# Patient Record
Sex: FEMALE | Race: WHITE | Employment: UNEMPLOYED | URBAN - METROPOLITAN AREA
[De-identification: names, ages, dates, MRNs, and addresses within clinical notes are randomized per-mention and may not be internally consistent; named-entity substitution may affect disease eponyms.]

---

## 2021-02-14 ENCOUNTER — APPOINTMENT (OUTPATIENT)
Dept: GENERAL RADIOLOGY | Age: 56
End: 2021-02-14
Payer: MEDICAID

## 2021-02-14 ENCOUNTER — HOSPITAL ENCOUNTER (EMERGENCY)
Age: 56
Discharge: HOME | End: 2021-02-14
Payer: MEDICAID

## 2021-02-14 VITALS
SYSTOLIC BLOOD PRESSURE: 124 MMHG | DIASTOLIC BLOOD PRESSURE: 86 MMHG | TEMPERATURE: 98.3 F | OXYGEN SATURATION: 97 % | RESPIRATION RATE: 18 BRPM | HEART RATE: 86 BPM

## 2021-02-14 DIAGNOSIS — S52.502A CLOSED FRACTURE OF DISTAL END OF LEFT RADIUS, UNSPECIFIED FRACTURE MORPHOLOGY, INITIAL ENCOUNTER: Primary | ICD-10-CM

## 2021-02-14 PROCEDURE — 6370000000 HC RX 637 (ALT 250 FOR IP): Performed by: PHYSICIAN ASSISTANT

## 2021-02-14 PROCEDURE — 99283 EMERGENCY DEPT VISIT LOW MDM: CPT

## 2021-02-14 PROCEDURE — 73110 X-RAY EXAM OF WRIST: CPT

## 2021-02-14 PROCEDURE — 25605 CLTX DST RDL FX/EPHYS SEP W/: CPT

## 2021-02-14 RX ORDER — ETHYL CHLORIDE 100 %
AEROSOL, SPRAY (ML) TOPICAL ONCE
Status: DISCONTINUED | OUTPATIENT
Start: 2021-02-14 | End: 2021-02-14 | Stop reason: HOSPADM

## 2021-02-14 RX ORDER — LIDOCAINE HYDROCHLORIDE 20 MG/ML
10 INJECTION, SOLUTION EPIDURAL; INFILTRATION; INTRACAUDAL; PERINEURAL ONCE
Status: DISCONTINUED | OUTPATIENT
Start: 2021-02-14 | End: 2021-02-14 | Stop reason: HOSPADM

## 2021-02-14 RX ORDER — OXYCODONE HYDROCHLORIDE AND ACETAMINOPHEN 5; 325 MG/1; MG/1
1 TABLET ORAL ONCE
Status: COMPLETED | OUTPATIENT
Start: 2021-02-14 | End: 2021-02-14

## 2021-02-14 RX ORDER — OXYCODONE HYDROCHLORIDE AND ACETAMINOPHEN 5; 325 MG/1; MG/1
1 TABLET ORAL EVERY 4 HOURS PRN
Qty: 15 TABLET | Refills: 0 | Status: SHIPPED | OUTPATIENT
Start: 2021-02-14 | End: 2021-02-17

## 2021-02-14 RX ADMIN — OXYCODONE HYDROCHLORIDE AND ACETAMINOPHEN 1 TABLET: 5; 325 TABLET ORAL at 17:07

## 2021-02-14 ASSESSMENT — PAIN DESCRIPTION - PAIN TYPE: TYPE: ACUTE PAIN

## 2021-02-14 ASSESSMENT — PAIN SCALES - GENERAL
PAINLEVEL_OUTOF10: 4
PAINLEVEL_OUTOF10: 6

## 2021-02-14 NOTE — ED PROVIDER NOTES
EMERGENCY DEPARTMENT ENCOUNTER      PCP: No primary care provider on file. CHIEF COMPLAINT    Chief Complaint   Patient presents with    Wrist Injury     left         This patient was not evaluated by the attending physician. I have independently evaluated this patient . HPI    Gemini Hyman is a 54 y.o. female who presents with Left wrist pain. Onset was prior to arrival.  Context is patient slipped on snow while walking on her porch outside causing her to fall onto left outstretched hand. She immediately notes pain to the left wrist region. Pain is achy, does not radiate. Pain is worse with attempted range of motion of wrist.  No other injuries. No distal numbness, tingling, weakness. REVIEW OF SYSTEMS    General: Denies fever or chills  Cardiac: Denies chest pain or chestwall pain. Pulmonary: Denies shortness of breath  GI: Denies abdominal pain, vomiting, or diarrhea  : No dysuria or hematuria  Musculoskeletal: See HPI. No other injuries. Denies any other muscles skeletal injuries, including elbow, shoulder,chest wall and back. All other review of systems are negative  See HPI and nursing notes for additional information     [address]    History reviewed. No pertinent past medical history. History reviewed. No pertinent surgical history.     CURRENT MEDICATIONS        ALLERGIES    No Known Allergies    SOCIAL & FAMILY HISTORY    Social History     Socioeconomic History    Marital status:      Spouse name: None    Number of children: None    Years of education: None    Highest education level: None   Occupational History    None   Social Needs    Financial resource strain: None    Food insecurity     Worry: None     Inability: None    Transportation needs     Medical: None     Non-medical: None   Tobacco Use    Smoking status: Current Every Day Smoker     Packs/day: 2.00    Smokeless tobacco: Never Used   Substance and Sexual Activity    Alcohol use: Not Currently    Drug use: Not Currently    Sexual activity: Not Currently   Lifestyle    Physical activity     Days per week: None     Minutes per session: None    Stress: None   Relationships    Social connections     Talks on phone: None     Gets together: None     Attends Holiness service: None     Active member of club or organization: None     Attends meetings of clubs or organizations: None     Relationship status: None    Intimate partner violence     Fear of current or ex partner: None     Emotionally abused: None     Physically abused: None     Forced sexual activity: None   Other Topics Concern    None   Social History Narrative    None     History reviewed. No pertinent family history. PHYSICAL EXAM    VITAL SIGNS: BP (!) 139/91   Pulse 88   Temp 97.9 °F (36.6 °C) (Oral)   Resp 18   SpO2 99%   Constitutional:  Well developed, well nourished, no acute distress, non-toxic appearance   HENT:  Atraumatic    Musculoskeletal:    Left  Wrist:  There is moderate swelling over the dorsal aspect of the distal forearm with slight bayonet deformity. This region is tender to palpation. No snuffbox tenderness. Range of motion limited due to pain. Distal sensation and capillary refill intact. Elbow, including radial head is non-tender. No swelling, discoloration, or tenderness to palpation of the ipsilateral elbow and hand/fingers. Distal motor, sensation, capillary refill intact. Integument:  Well hydrated, no rash. skin intact  Vascular: affected extremity distally neurovascularly intact - sensation and capillary refill intact. Neurologic:  Awake alert, normal flow ofspeech   Psychiatric: Cooperative, pleasant affect    RADIOLOGY/PROCEDURES    XR WRIST LEFT (MIN 3 VIEWS)   Final Result   1. Severely comminuted, displaced distal radial fracture with dorsal   angulation and overriding of fragments. 2. Displaced ulnar styloid process fracture. questions or concerns please feel free to contact the dictating provider for clarification.        Tyrone Magallon, [address]  02/14/21 4471

## 2021-02-14 NOTE — ED NOTES
1819 paged Dr Sarah Shelton. [address]  02/14/21 1819  1820 Dr aSrah Shelton returned call.       [address]  02/14/21 182

## 2021-02-15 ENCOUNTER — TELEPHONE (OUTPATIENT)
Dept: ORTHOPEDIC SURGERY | Age: 56
End: 2021-02-15

## 2021-02-15 ENCOUNTER — HOSPITAL ENCOUNTER (OUTPATIENT)
Age: 56
Discharge: HOME | End: 2021-02-15
Payer: MEDICAID

## 2021-02-15 ENCOUNTER — ANESTHESIA EVENT (OUTPATIENT)
Dept: OPERATING ROOM | Age: 56
End: 2021-02-15
Payer: MEDICAID

## 2021-02-15 ENCOUNTER — OFFICE VISIT (OUTPATIENT)
Dept: ORTHOPEDIC SURGERY | Age: 56
End: 2021-02-15
Payer: MEDICAID

## 2021-02-15 VITALS — BODY MASS INDEX: 31.39 KG/M2 | HEIGHT: 67 IN | OXYGEN SATURATION: 93 % | HEART RATE: 92 BPM | WEIGHT: 200 LBS

## 2021-02-15 DIAGNOSIS — S52.502A TRAUMATIC CLOSED DISPLACED FRACTURE OF DISTAL END OF LEFT RADIUS AND ULNA, INITIAL ENCOUNTER: Primary | ICD-10-CM

## 2021-02-15 DIAGNOSIS — S52.602A TRAUMATIC CLOSED DISPLACED FRACTURE OF DISTAL END OF LEFT RADIUS AND ULNA, INITIAL ENCOUNTER: Primary | ICD-10-CM

## 2021-02-15 PROCEDURE — C9803 HOPD COVID-19 SPEC COLLECT: HCPCS

## 2021-02-15 PROCEDURE — U0002 COVID-19 LAB TEST NON-CDC: HCPCS

## 2021-02-15 PROCEDURE — 99203 OFFICE O/P NEW LOW 30 MIN: CPT | Performed by: ORTHOPAEDIC SURGERY

## 2021-02-15 RX ORDER — PANTOPRAZOLE SODIUM 20 MG/1
20 TABLET, DELAYED RELEASE ORAL DAILY
COMMUNITY

## 2021-02-15 RX ORDER — OXYCODONE HYDROCHLORIDE AND ACETAMINOPHEN 5; 325 MG/1; MG/1
1 TABLET ORAL EVERY 6 HOURS PRN
Qty: 20 TABLET | Refills: 0 | Status: SHIPPED | OUTPATIENT
Start: 2021-02-15 | End: 2021-02-22

## 2021-02-15 RX ORDER — CEPHALEXIN 250 MG/1
250 CAPSULE ORAL 4 TIMES DAILY
Qty: 4 CAPSULE | Refills: 0 | Status: SHIPPED | OUTPATIENT
Start: 2021-02-15 | End: 2021-02-16

## 2021-02-15 RX ORDER — CETIRIZINE HYDROCHLORIDE 10 MG/1
10 TABLET ORAL DAILY
COMMUNITY

## 2021-02-15 ASSESSMENT — ENCOUNTER SYMPTOMS
COLOR CHANGE: 0
SHORTNESS OF BREATH: 0

## 2021-02-15 ASSESSMENT — LIFESTYLE VARIABLES: SMOKING_STATUS: 1

## 2021-02-15 NOTE — PROGRESS NOTES
Surgery:  [email or web address]                        Arrival time: 0930  Nothing to eat or drink after midnight unless instructed to take certain medications by the doctor or the nurse the am of surgery  Arrive at the front information desk -[facility] is on [address] [facility]  Please leave money and all other valuables at home. Wear comfortable clothing. If you wear contacts please bring a case. No make up. You may be asked to remove rings or body piercing. Please bring insurance cards and picture ID am of procedure. Please bring any consent or paper work from your doctor. If you become ill,such as a cold, sore throat or fever contact your doctor. Please bathe or shower am of procedure.   Medications to take AM of procedure:     Any questions call [facility]  [phone]     Pt had COVID done 2/15/21

## 2021-02-15 NOTE — PROGRESS NOTES
Subjective:      Patient ID: Alejandro Spencer is a 54 y.o. female. Elliott Stone presents today for evaluation of a left wrist injury that occurred yesterday when she slipped and fell off her patio. She landed directly on her left out reached hand. Her pain today is rated 8/10 without pain medication. She comes in today for her first visit with me in regards to her left wrist injury. She states that since she fell she has continued to have a deep, throbbing pain globally in her left wrist.  She states that she is having some numbness in her index finger and middle finger distally on both the dorsal and palmar side. She has been wearing her splint since she was seen in the ED which has been helping with her pain. She denies any prior injury to the left wrist and denies any fever or chills. Review of Systems   Constitutional: Negative for activity change, chills and fever. Respiratory: Negative for shortness of breath. Cardiovascular: Negative for chest pain. Musculoskeletal: Positive for arthralgias, joint swelling and myalgias. Negative for gait problem. Skin: Negative for color change, pallor, rash and wound. Neurological: Positive for weakness and numbness. No past medical history on file. Objective:   Physical Exam  Constitutional:       Appearance: She is well-developed. HENT:      Head: Normocephalic and atraumatic. Eyes:      Pupils: Pupils are equal, round, and reactive to light. Neck:      Musculoskeletal: Normal range of motion. Pulmonary:      Effort: Pulmonary effort is normal.   Musculoskeletal:         General: Swelling, tenderness, deformity and signs of injury present. Right elbow: Normal.     Left elbow: Normal.      Right wrist: She exhibits normal range of motion, no tenderness, no bony tenderness, no swelling, no effusion, no crepitus, no deformity and no laceration. Left wrist: She exhibits decreased range of motion, tenderness, bony tenderness, swelling and deformity. She exhibits no effusion, no crepitus and no laceration. Skin:     General: Skin is warm and dry. Capillary Refill: Capillary refill takes less than 2 seconds. Coloration: Skin is not pale. Findings: No erythema or rash. Neurological:      Mental Status: She is alert and oriented to person, place, and time. Left wrist-skin intact, no tenting of the skin, moderate swelling and moderate ecchymosis, compartments soft. Range of motion not assessed due to recent injury. She is able to flex and extend her fingers but decreased motion due to pain. XRAY  X-ray 3 views of the left wrist from February 14, 2021 reviewed by me today in the office demonstrates age appropriate bone density throughout with a comminuted, transverse fracture through the distal radius with 100% dorsal displacement, shortening and angulation, no subluxation or dislocation noted      Assessment:      Left distal radius fracture      Plan:      I discussed with her today her x-ray findings. I explained to her that she does have a fracture in her left wrist which will require surgical treatment. I discussed with her today performing open reduction and internal fixation of her left distal radius fracture. I explained risks, benefits, possible complications of the procedure and answered all questions for the patient. I explained postoperative rehabilitation protocol and expectations with the patient today. The patient understands and consents to the procedure. Patient will follow up with their primary care physician prior to surgical treatment for preoperative clearance. We will schedule surgery at soonest convenience. Continue splint as needed for comfort. Pain medication as needed. Ice and elevate as needed.   Follow-up will be 2 weeks postop for suture removal and recheck with x-rays of the left wrist. We will plan on proceeding with surgical treatment tomorrow at [facility].         Lesia 97, DO

## 2021-02-15 NOTE — PATIENT INSTRUCTIONS
We will schedule surgery at soonest convenience. If you have any questions regarding your surgery please call our office and ask to speak with Kristi.  [phone]

## 2021-02-15 NOTE — TELEPHONE ENCOUNTER
Dr. Chung Files was called by the ED, he would like to see patient today and do surgery tomorrow, called [phone], left message for a return call for an appt.  Johnathon Reddy

## 2021-02-15 NOTE — ANESTHESIA PRE PROCEDURE
Department of Anesthesiology  Preprocedure Note       Name:  Дмитрий Campbell   Age:  54 y.o.  :  1965                                          MRN:  [id number]         Date:  2/15/2021      Surgeon: Boaz Knight):  Aury Naidu DO    Procedure: Procedure(s):  LEFT HUMERUS OPEN REDUCTION INTERNAL FIXATION    Medications prior to admission:   Prior to Admission medications    Medication Sig Start Date End Date Taking? Authorizing Provider   cetirizine (ZYRTEC) 10 MG tablet Take 10 mg by mouth daily    Historical Provider, MD   pantoprazole (PROTONIX) 20 MG tablet Take 20 mg by mouth daily    Historical Provider, MD   oxyCODONE-acetaminophen (PERCOCET) 5-325 MG per tablet Take 1 tablet by mouth every 6 hours as needed for Pain for up to 7 days. Intended supply: 3 days. Take lowest dose possible to manage pain 2/15/21 2/22/21  Aury Naidu DO   cephALEXin [facility]) 250 MG capsule Take 1 capsule by mouth 4 times daily for 1 day 2/15/21 2/16/21  Aury Naidu DO   oxyCODONE-acetaminophen (PERCOCET) 5-325 MG per tablet Take 1 tablet by mouth every 4 hours as needed for Pain for up to 3 days. 21  OSCAR Mayfield       Current medications:    No current facility-administered medications for this encounter. Current Outpatient Medications   Medication Sig Dispense Refill    cetirizine (ZYRTEC) 10 MG tablet Take 10 mg by mouth daily      pantoprazole (PROTONIX) 20 MG tablet Take 20 mg by mouth daily      oxyCODONE-acetaminophen (PERCOCET) 5-325 MG per tablet Take 1 tablet by mouth every 6 hours as needed for Pain for up to 7 days. Intended supply: 3 days. Take lowest dose possible to manage pain 20 tablet 0    cephALEXin (KEFLEX) 250 MG capsule Take 1 capsule by mouth 4 times daily for 1 day 4 capsule 0    oxyCODONE-acetaminophen (PERCOCET) 5-325 MG per tablet Take 1 tablet by mouth every 4 hours as needed for Pain for up to 3 days.  15 tablet 0       Allergies:  No Known Allergies Beta Blocker:  Not on Beta Blocker         Neuro/Psych:               GI/Hepatic/Renal:             Endo/Other:              Pt had no PAT visit       Abdominal:           Vascular:                                      Anesthesia Plan      general and regional     ASA 2     (Chart review only  covid pending )  Induction: intravenous. MIPS: Postoperative opioids intended. Anesthetic plan and risks discussed with patient. Plan discussed with CRNA.     Attending anesthesiologist reviewed and agrees with Pre Eval content            JOANN Lowe - CRNA   2/15/2021

## 2021-02-15 NOTE — PROGRESS NOTES
Janis Sylvester presents today for evaluation of a left wrist injury that occurred yesterday when she slipped and fell off her patio. She landed directly on her left out reached hand. Her pain today is rated 8/10 without pain medication.

## 2021-02-15 NOTE — ED NOTES
Reviewed discharge information. Patient verbalized understanding of paperwork, medication, and care instructions. Patient denied any questions.      Mobile [employer], RN  02/14/21 8320

## 2021-02-16 ENCOUNTER — APPOINTMENT (OUTPATIENT)
Dept: GENERAL RADIOLOGY | Age: 56
End: 2021-02-16
Attending: ORTHOPAEDIC SURGERY
Payer: MEDICAID

## 2021-02-16 ENCOUNTER — ANESTHESIA (OUTPATIENT)
Dept: OPERATING ROOM | Age: 56
End: 2021-02-16
Payer: MEDICAID

## 2021-02-16 ENCOUNTER — HOSPITAL ENCOUNTER (OUTPATIENT)
Age: 56
Setting detail: OUTPATIENT SURGERY
Discharge: HOME | End: 2021-02-16
Attending: ORTHOPAEDIC SURGERY | Admitting: ORTHOPAEDIC SURGERY
Payer: MEDICAID

## 2021-02-16 VITALS
RESPIRATION RATE: 16 BRPM | BODY MASS INDEX: 31.39 KG/M2 | WEIGHT: 200 LBS | HEART RATE: 83 BPM | TEMPERATURE: 97.2 F | SYSTOLIC BLOOD PRESSURE: 110 MMHG | OXYGEN SATURATION: 92 % | DIASTOLIC BLOOD PRESSURE: 98 MMHG | HEIGHT: 67 IN

## 2021-02-16 VITALS
SYSTOLIC BLOOD PRESSURE: 99 MMHG | TEMPERATURE: 96.8 F | RESPIRATION RATE: 16 BRPM | OXYGEN SATURATION: 97 % | DIASTOLIC BLOOD PRESSURE: 65 MMHG

## 2021-02-16 LAB
SARS-COV-2, NAAT: NOT DETECTED
SARS-COV-2: NOT DETECTED
SOURCE: NORMAL

## 2021-02-16 PROCEDURE — 6360000002 HC RX W HCPCS: Performed by: ANESTHESIOLOGY

## 2021-02-16 PROCEDURE — 3700000000 HC ANESTHESIA ATTENDED CARE: Performed by: ORTHOPAEDIC SURGERY

## 2021-02-16 PROCEDURE — C1713 ANCHOR/SCREW BN/BN,TIS/BN: HCPCS | Performed by: ORTHOPAEDIC SURGERY

## 2021-02-16 PROCEDURE — 3209999900 FL LESS THAN 1 HOUR

## 2021-02-16 PROCEDURE — 2709999900 HC NON-CHARGEABLE SUPPLY: Performed by: ORTHOPAEDIC SURGERY

## 2021-02-16 PROCEDURE — 6360000002 HC RX W HCPCS: Performed by: ORTHOPAEDIC SURGERY

## 2021-02-16 PROCEDURE — 3600000014 HC SURGERY LEVEL 4 ADDTL 15MIN: Performed by: ORTHOPAEDIC SURGERY

## 2021-02-16 PROCEDURE — 7100000011 HC PHASE II RECOVERY - ADDTL 15 MIN: Performed by: ORTHOPAEDIC SURGERY

## 2021-02-16 PROCEDURE — 7100000010 HC PHASE II RECOVERY - FIRST 15 MIN: Performed by: ORTHOPAEDIC SURGERY

## 2021-02-16 PROCEDURE — 3700000001 HC ADD 15 MINUTES (ANESTHESIA): Performed by: ORTHOPAEDIC SURGERY

## 2021-02-16 PROCEDURE — 2500000003 HC RX 250 WO HCPCS: Performed by: NURSE ANESTHETIST, CERTIFIED REGISTERED

## 2021-02-16 PROCEDURE — 64415 NJX AA&/STRD BRCH PLXS IMG: CPT | Performed by: ANESTHESIOLOGY

## 2021-02-16 PROCEDURE — 25609 OPTX DST RD XART FX/EP SEP3+: CPT | Performed by: PHYSICIAN ASSISTANT

## 2021-02-16 PROCEDURE — 6360000002 HC RX W HCPCS: Performed by: NURSE ANESTHETIST, CERTIFIED REGISTERED

## 2021-02-16 PROCEDURE — 87635 SARS-COV-2 COVID-19 AMP PRB: CPT

## 2021-02-16 PROCEDURE — 2580000003 HC RX 258: Performed by: ORTHOPAEDIC SURGERY

## 2021-02-16 PROCEDURE — 3600000004 HC SURGERY LEVEL 4 BASE: Performed by: ORTHOPAEDIC SURGERY

## 2021-02-16 PROCEDURE — 2780000010 HC IMPLANT OTHER: Performed by: ORTHOPAEDIC SURGERY

## 2021-02-16 PROCEDURE — 6370000000 HC RX 637 (ALT 250 FOR IP): Performed by: ORTHOPAEDIC SURGERY

## 2021-02-16 PROCEDURE — 25609 OPTX DST RD XART FX/EP SEP3+: CPT | Performed by: ORTHOPAEDIC SURGERY

## 2021-02-16 DEVICE — LOQTEQ® VA CORTICAL SCREW 2.5, T8, L 20 TITANIUM
Type: IMPLANTABLE DEVICE | Site: WRIST | Status: FUNCTIONAL
Brand: LOQTEQ®

## 2021-02-16 DEVICE — LOQTEQ® VA CORTICAL SCREW 2.5, T8, L 22 TITANIUM
Type: IMPLANTABLE DEVICE | Site: WRIST | Status: FUNCTIONAL
Brand: LOQTEQ®

## 2021-02-16 DEVICE — LOQTEQ® VA CORTICAL SCREW 2.5, T8, L 14 TITANIUM
Type: IMPLANTABLE DEVICE | Site: WRIST | Status: FUNCTIONAL
Brand: LOQTEQ®

## 2021-02-16 DEVICE — LOQTEQ® VA CORTICAL SCREW 2.5, T8, L 16 TITANIUM
Type: IMPLANTABLE DEVICE | Site: WRIST | Status: FUNCTIONAL
Brand: LOQTEQ®

## 2021-02-16 DEVICE — LOQTEQ® VA CORTICAL SCREW 2.5, T8, L 18 TITANIUM
Type: IMPLANTABLE DEVICE | Site: WRIST | Status: FUNCTIONAL
Brand: LOQTEQ®

## 2021-02-16 DEVICE — CORTICAL SCREW 2.5, SMALL HEAD T8, SELF-TAPP. L 14: Type: IMPLANTABLE DEVICE | Site: WRIST | Status: FUNCTIONAL

## 2021-02-16 RX ORDER — FENTANYL CITRATE 50 UG/ML
INJECTION, SOLUTION INTRAMUSCULAR; INTRAVENOUS PRN
Status: DISCONTINUED | OUTPATIENT
Start: 2021-02-16 | End: 2021-02-16 | Stop reason: SDUPTHER

## 2021-02-16 RX ORDER — DIPHENHYDRAMINE HYDROCHLORIDE 50 MG/ML
12.5 INJECTION INTRAMUSCULAR; INTRAVENOUS
Status: DISCONTINUED | OUTPATIENT
Start: 2021-02-16 | End: 2021-02-16 | Stop reason: HOSPADM

## 2021-02-16 RX ORDER — KETAMINE HCL 50MG/ML(1)
SYRINGE (ML) INTRAVENOUS PRN
Status: DISCONTINUED | OUTPATIENT
Start: 2021-02-16 | End: 2021-02-16 | Stop reason: SDUPTHER

## 2021-02-16 RX ORDER — FENTANYL CITRATE 50 UG/ML
50 INJECTION, SOLUTION INTRAMUSCULAR; INTRAVENOUS EVERY 5 MIN PRN
Status: DISCONTINUED | OUTPATIENT
Start: 2021-02-16 | End: 2021-02-16 | Stop reason: HOSPADM

## 2021-02-16 RX ORDER — ACETAMINOPHEN 500 MG
1000 TABLET ORAL ONCE
Status: COMPLETED | OUTPATIENT
Start: 2021-02-16 | End: 2021-02-16

## 2021-02-16 RX ORDER — PROPOFOL 10 MG/ML
INJECTION, EMULSION INTRAVENOUS CONTINUOUS PRN
Status: DISCONTINUED | OUTPATIENT
Start: 2021-02-16 | End: 2021-02-16 | Stop reason: SDUPTHER

## 2021-02-16 RX ORDER — OXYCODONE HYDROCHLORIDE 5 MG/1
5 TABLET ORAL EVERY 4 HOURS PRN
Status: CANCELLED | OUTPATIENT
Start: 2021-02-16

## 2021-02-16 RX ORDER — SODIUM CHLORIDE 0.9 % (FLUSH) 0.9 %
10 SYRINGE (ML) INJECTION PRN
Status: CANCELLED | OUTPATIENT
Start: 2021-02-16

## 2021-02-16 RX ORDER — HYDROCODONE BITARTRATE AND ACETAMINOPHEN 5; 325 MG/1; MG/1
1 TABLET ORAL PRN
Status: DISCONTINUED | OUTPATIENT
Start: 2021-02-16 | End: 2021-02-16 | Stop reason: HOSPADM

## 2021-02-16 RX ORDER — SODIUM CHLORIDE, SODIUM LACTATE, POTASSIUM CHLORIDE, CALCIUM CHLORIDE 600; 310; 30; 20 MG/100ML; MG/100ML; MG/100ML; MG/100ML
INJECTION, SOLUTION INTRAVENOUS CONTINUOUS
Status: CANCELLED | OUTPATIENT
Start: 2021-02-16

## 2021-02-16 RX ORDER — ROPIVACAINE HYDROCHLORIDE 5 MG/ML
INJECTION, SOLUTION EPIDURAL; INFILTRATION; PERINEURAL
Status: COMPLETED | OUTPATIENT
Start: 2021-02-16 | End: 2021-02-16

## 2021-02-16 RX ORDER — HYDROCODONE BITARTRATE AND ACETAMINOPHEN 5; 325 MG/1; MG/1
2 TABLET ORAL EVERY 6 HOURS PRN
Status: DISCONTINUED | OUTPATIENT
Start: 2021-02-16 | End: 2021-02-16 | Stop reason: HOSPADM

## 2021-02-16 RX ORDER — ONDANSETRON 2 MG/ML
4 INJECTION INTRAMUSCULAR; INTRAVENOUS
Status: DISCONTINUED | OUTPATIENT
Start: 2021-02-16 | End: 2021-02-16 | Stop reason: HOSPADM

## 2021-02-16 RX ORDER — HYDRALAZINE HYDROCHLORIDE 20 MG/ML
5 INJECTION INTRAMUSCULAR; INTRAVENOUS EVERY 10 MIN PRN
Status: DISCONTINUED | OUTPATIENT
Start: 2021-02-16 | End: 2021-02-16 | Stop reason: HOSPADM

## 2021-02-16 RX ORDER — SODIUM CHLORIDE 0.9 % (FLUSH) 0.9 %
10 SYRINGE (ML) INJECTION PRN
Status: DISCONTINUED | OUTPATIENT
Start: 2021-02-16 | End: 2021-02-16 | Stop reason: HOSPADM

## 2021-02-16 RX ORDER — HYDROMORPHONE HCL 110MG/55ML
0.5 PATIENT CONTROLLED ANALGESIA SYRINGE INTRAVENOUS EVERY 5 MIN PRN
Status: DISCONTINUED | OUTPATIENT
Start: 2021-02-16 | End: 2021-02-16 | Stop reason: HOSPADM

## 2021-02-16 RX ORDER — OXYCODONE HYDROCHLORIDE 5 MG/1
10 TABLET ORAL EVERY 4 HOURS PRN
Status: CANCELLED | OUTPATIENT
Start: 2021-02-16

## 2021-02-16 RX ORDER — SODIUM CHLORIDE, SODIUM LACTATE, POTASSIUM CHLORIDE, CALCIUM CHLORIDE 600; 310; 30; 20 MG/100ML; MG/100ML; MG/100ML; MG/100ML
INJECTION, SOLUTION INTRAVENOUS CONTINUOUS
Status: DISCONTINUED | OUTPATIENT
Start: 2021-02-16 | End: 2021-02-16 | Stop reason: HOSPADM

## 2021-02-16 RX ORDER — 0.9 % SODIUM CHLORIDE 0.9 %
500 INTRAVENOUS SOLUTION INTRAVENOUS
Status: DISCONTINUED | OUTPATIENT
Start: 2021-02-16 | End: 2021-02-16 | Stop reason: HOSPADM

## 2021-02-16 RX ORDER — LABETALOL HYDROCHLORIDE 5 MG/ML
5 INJECTION, SOLUTION INTRAVENOUS EVERY 10 MIN PRN
Status: DISCONTINUED | OUTPATIENT
Start: 2021-02-16 | End: 2021-02-16 | Stop reason: HOSPADM

## 2021-02-16 RX ORDER — ONDANSETRON 2 MG/ML
INJECTION INTRAMUSCULAR; INTRAVENOUS PRN
Status: DISCONTINUED | OUTPATIENT
Start: 2021-02-16 | End: 2021-02-16 | Stop reason: SDUPTHER

## 2021-02-16 RX ORDER — SODIUM CHLORIDE 0.9 % (FLUSH) 0.9 %
10 SYRINGE (ML) INJECTION EVERY 12 HOURS SCHEDULED
Status: CANCELLED | OUTPATIENT
Start: 2021-02-16

## 2021-02-16 RX ORDER — DEXAMETHASONE SODIUM PHOSPHATE 4 MG/ML
INJECTION, SOLUTION INTRA-ARTICULAR; INTRALESIONAL; INTRAMUSCULAR; INTRAVENOUS; SOFT TISSUE PRN
Status: DISCONTINUED | OUTPATIENT
Start: 2021-02-16 | End: 2021-02-16 | Stop reason: SDUPTHER

## 2021-02-16 RX ORDER — SODIUM CHLORIDE 0.9 % (FLUSH) 0.9 %
10 SYRINGE (ML) INJECTION EVERY 12 HOURS SCHEDULED
Status: DISCONTINUED | OUTPATIENT
Start: 2021-02-16 | End: 2021-02-16 | Stop reason: HOSPADM

## 2021-02-16 RX ORDER — PROMETHAZINE HYDROCHLORIDE 25 MG/ML
6.25 INJECTION, SOLUTION INTRAMUSCULAR; INTRAVENOUS
Status: DISCONTINUED | OUTPATIENT
Start: 2021-02-16 | End: 2021-02-16 | Stop reason: HOSPADM

## 2021-02-16 RX ORDER — MEPERIDINE HYDROCHLORIDE 25 MG/ML
12.5 INJECTION INTRAMUSCULAR; INTRAVENOUS; SUBCUTANEOUS EVERY 5 MIN PRN
Status: DISCONTINUED | OUTPATIENT
Start: 2021-02-16 | End: 2021-02-16 | Stop reason: HOSPADM

## 2021-02-16 RX ORDER — MIDAZOLAM HYDROCHLORIDE 1 MG/ML
INJECTION INTRAMUSCULAR; INTRAVENOUS PRN
Status: DISCONTINUED | OUTPATIENT
Start: 2021-02-16 | End: 2021-02-16 | Stop reason: SDUPTHER

## 2021-02-16 RX ORDER — LIDOCAINE HYDROCHLORIDE 20 MG/ML
INJECTION, SOLUTION INTRAVENOUS PRN
Status: DISCONTINUED | OUTPATIENT
Start: 2021-02-16 | End: 2021-02-16 | Stop reason: SDUPTHER

## 2021-02-16 RX ADMIN — DEXAMETHASONE SODIUM PHOSPHATE 8 MG: 4 INJECTION, SOLUTION INTRAMUSCULAR; INTRAVENOUS at 13:22

## 2021-02-16 RX ADMIN — FENTANYL CITRATE 100 MCG: 50 INJECTION INTRAMUSCULAR; INTRAVENOUS at 13:18

## 2021-02-16 RX ADMIN — Medication 20 MG: at 13:22

## 2021-02-16 RX ADMIN — ONDANSETRON 4 MG: 2 INJECTION INTRAMUSCULAR; INTRAVENOUS at 13:22

## 2021-02-16 RX ADMIN — SODIUM CHLORIDE, POTASSIUM CHLORIDE, SODIUM LACTATE AND CALCIUM CHLORIDE: 600; 310; 30; 20 INJECTION, SOLUTION INTRAVENOUS at 10:04

## 2021-02-16 RX ADMIN — PROPOFOL 100 MCG/KG/MIN: 10 INJECTION, EMULSION INTRAVENOUS at 13:18

## 2021-02-16 RX ADMIN — CEFAZOLIN SODIUM 2 G: 10 INJECTION, POWDER, FOR SOLUTION INTRAVENOUS at 13:16

## 2021-02-16 RX ADMIN — SODIUM CHLORIDE, POTASSIUM CHLORIDE, SODIUM LACTATE AND CALCIUM CHLORIDE: 600; 310; 30; 20 INJECTION, SOLUTION INTRAVENOUS at 13:16

## 2021-02-16 RX ADMIN — ACETAMINOPHEN 1000 MG: 500 TABLET ORAL at 09:53

## 2021-02-16 RX ADMIN — MIDAZOLAM 2 MG: 1 INJECTION INTRAMUSCULAR; INTRAVENOUS at 10:48

## 2021-02-16 RX ADMIN — LIDOCAINE HYDROCHLORIDE 50 MG: 20 INJECTION, SOLUTION INTRAVENOUS at 13:18

## 2021-02-16 RX ADMIN — ROPIVACAINE HYDROCHLORIDE 15 ML: 5 INJECTION, SOLUTION EPIDURAL; INFILTRATION; PERINEURAL at 11:00

## 2021-02-16 ASSESSMENT — PULMONARY FUNCTION TESTS
PIF_VALUE: 1

## 2021-02-16 ASSESSMENT — PAIN SCALES - GENERAL
PAINLEVEL_OUTOF10: 0
PAINLEVEL_OUTOF10: 0

## 2021-02-16 ASSESSMENT — PAIN - FUNCTIONAL ASSESSMENT: PAIN_FUNCTIONAL_ASSESSMENT: 0-10

## 2021-02-16 NOTE — ANESTHESIA POSTPROCEDURE EVALUATION
Department of Anesthesiology  Postprocedure Note    Patient: Yuli Moffett  MRN: [id number]  YOB: 1965  Date of evaluation: 2/16/2021  Time:  2:41 PM     Procedure Summary     Date: 02/16/21 Room / Location: [address] [place] [address]    Anesthesia Start: 0059 Anesthesia Stop: 1683    Procedure: LEFT WRIST OPEN REDUCTION INTERNAL FIXATION (Left Wrist) Diagnosis: (TRAUMATIC CLOSED DISPLACED FRACTURE OF DISTAL END OF LEFT RADIUS, ULNA)    Surgeons: Lazaro Humphrey DO Responsible Provider: Eliana Hanson MD    Anesthesia Type: general, regional ASA Status: 2          Anesthesia Type: general, regional    Chandni Phase I:      Chandni Phase II:      Last vitals: Reviewed and per EMR flowsheets.        Anesthesia Post Evaluation    Patient location during evaluation: bedside  Patient participation: complete - patient participated  Level of consciousness: awake and alert  Pain score: 0  Airway patency: patent  Nausea & Vomiting: no nausea and no vomiting  Complications: no  Cardiovascular status: blood pressure returned to baseline  Respiratory status: acceptable  Hydration status: euvolemic

## 2021-02-16 NOTE — ANESTHESIA PROCEDURE NOTES
Peripheral Block    Patient location during procedure: PACU  Start time: 2/16/2021 10:55 AM  End time: 2/16/2021 11:02 AM  Staffing  Performed: anesthesiologist and resident/CRNA   Anesthesiologist: Stephanie Madera MD  Resident/CRNA: JOANN Peterson CRNA  Preanesthetic Checklist  Completed: patient identified, IV checked, site marked, risks and benefits discussed, surgical consent, monitors and equipment checked, pre-op evaluation, timeout performed, anesthesia consent given, oxygen available and patient being monitored  Peripheral Block  Patient position: supine  Prep: ChloraPrep  Patient monitoring: cardiac monitor, continuous pulse ox, frequent blood pressure checks and IV access  Block type: Brachial plexus  Laterality: left  Injection technique: single-shot  Guidance: ultrasound guided  Supraclavicular  Provider prep: sterile gloves  Needle  Needle type: short-bevel   Needle gauge: 22 G  Needle length: 5 cm  Needle localization: ultrasound guidance  Test dose: negative  Assessment  Injection assessment: negative aspiration for heme and local visualized surrounding nerve on ultrasound  Paresthesia pain: none  Slow fractionated injection: yes  Hemodynamics: stable  Medications Administered  Ropivacaine (NAROPIN) injection 0.5%, 15 mL  Reason for block: post-op pain management

## 2021-02-16 NOTE — H&P
manage pain 20 tablet 0    cephALEXin (KEFLEX) 250 MG capsule Take 1 capsule by mouth 4 times daily for 1 day 4 capsule 0     No Known Allergies  Past Surgical History:   Procedure Laterality Date    ASD REPAIR      1969 repaired hole in heart    TONSILLECTOMY      age 6     Social History     Tobacco Use    Smoking status: Current Every Day Smoker     Packs/day: 2.00    Smokeless tobacco: Never Used   Substance Use Topics    Alcohol use: Not Currently    Drug use: Not Currently     Family History   Problem Relation Age of Onset    Stroke Mother     High Blood Pressure Mother     Cancer Father         Lung CA    Other Brother         Stomach issues       Objective:   Physical Exam  Constitutional:       Appearance: She is well-developed. HENT:      Head: Normocephalic and atraumatic. Eyes:      Pupils: Pupils are equal, round, and reactive to light. Neck:      Musculoskeletal: Normal range of motion. Pulmonary:      Effort: Pulmonary effort is normal.   Musculoskeletal:         General: Swelling, tenderness, deformity and signs of injury present. Right elbow: Normal.     Left elbow: Normal.      Right wrist: She exhibits normal range of motion, no tenderness, no bony tenderness, no swelling, no effusion, no crepitus, no deformity and no laceration. Left wrist: She exhibits decreased range of motion, tenderness, bony tenderness, swelling and deformity. She exhibits no effusion, no crepitus and no laceration. Skin:     General: Skin is warm and dry. Capillary Refill: Capillary refill takes less than 2 seconds. Coloration: Skin is not pale. Findings: No erythema or rash. Neurological:      Mental Status: She is alert and oriented to person, place, and time. Left wrist-skin intact, no tenting of the skin, moderate swelling and moderate ecchymosis, compartments soft. Range of motion not assessed due to recent injury.   She is able to flex and extend her fingers but decreased motion due to pain.     XRAY  X-ray 3 views of the left wrist from February 14, 2021 reviewed by me today in the office demonstrates age appropriate bone density throughout with a comminuted, transverse fracture through the distal radius with 100% dorsal displacement, shortening and angulation, no subluxation or dislocation noted      /83   Pulse 111   Temp 97.2 °F (36.2 °C) (Temporal)   Resp 16   Ht 5' 7\" (1.702 m)   Wt 200 lb (90.7 kg)   SpO2 93%   BMI 31.32 kg/m²     Assessment:   Left distal radius fracture                Plan:   I discussed with her today her x-ray findings. I explained to her that she does have a fracture in her left wrist which will require surgical treatment. I discussed with her today performing open reduction and internal fixation of her left distal radius fracture. I explained risks, benefits, possible complications of the procedure and answered all questions for the patient. I explained postoperative rehabilitation protocol and expectations with the patient today. The patient understands and consents to the procedure. Patient will follow up with their primary care physician prior to surgical treatment for preoperative clearance. We will schedule surgery at soonest convenience. Continue splint as needed for comfort. Pain medication as needed. Ice and elevate as needed. Follow-up will be 2 weeks postop for suture removal and recheck with x-rays of the left wrist.  We will plan on proceeding with surgical treatment tomorrow at [address] [facility] patient was counseled at length about the risks of nile Covid-19 during their perioperative period and any recovery window from their procedure. The patient was made aware that nile Covid-19  may worsen their prognosis for recovering from their procedure  and lend to a higher morbidity and/or mortality risk.   All material risks, benefits, and reasonable alternatives including postponing the procedure were discussed. The patient does wish to proceed with the procedure at this time. Pt seen and examined, No change in H+P.     Lesia 97, DO

## 2021-02-16 NOTE — OP NOTE
DATE OF PROCEDURE:  2/16/2021    PREOPERATIVE DIAGNOSIS:  Left intra-articular distal radius fracture. POSTOPERATIVE DIAGNOSIS:  Left 3 part intra-articular distal radius fracture. PROCEDURES:  1. Open reduction and internal fixation of left 3 part intra-articular distal  radius fracture using AAP 4 hole wide distal volar radial locking plate. 2.  Fluoroscopic guidance and interpretation. ATTENDING SURGEON:  Aury Naidu DO    FIRST ASSISTANT: OSCAR Roblero    ANESTHESIA:  MAC with regional block. ESTIMATED BLOOD LOSS:  5 mL. TOTAL TOURNIQUET TIME:  60 minutes. FLUIDS:  1000 mL of crystalloids. INDICATION FOR PROCEDURE:  The patient is a 59-year-old female who  sustained a fall landing onto her left wrist.  She was initially seen  in the Emergency Department and subsequently followed up in my office. Evaluation of her x-rays revealed a displaced fracture of the left  distal radius. Given the fracture pattern, I recommended surgical  treatment. I explained the risks, benefits, and possible complications  of the procedure to the patient, and after answering all of her  questions, she consented to undergo the above procedure. DESCRIPTION OF PROCEDURE:  The patient was seen and evaluated in the  preoperative holding area where the left upper extremity was signed in  her presence. At this point, care of the patient was turned over to  Anesthesia Team, who performed a regional block to the left upper  extremity. She was then transported back to the operative suite. She  was placed supine on the operating table with the left upper extremity  on an armboard. General anesthesia was applied, and once adequate  anesthesia was obtained, the left upper extremity was prepped and  draped in the usual sterile fashion. Preoperative antibiotics were  administered. At this point, a timeout was performed and all in  attendance were in agreement.     I exsanguinated the left upper extremity using an Esmarch and  tourniquet was inflated to 200 mmHg. I then marked out the planned  surgical incision overlying the volar aspect of the left wrist directly  overlying the FCR tendon. I then an incision in this location and  carried sharp dissection down to the level of the FCR tendon. The  tendon was retracted radially. I then incised through the fascia  overlying the FPL muscle belly. I then retracted the FPL muscle belly  ulnarly protecting the median nerve and FCR tendon radially protecting  the radial artery. I placed a Weitlaner retractor for further  visualization. I then incised through the pronator quadratus off the  radial border of the distal radius. I then used the key elevator to  elevate the pronator off the distal radius and the distal radial shaft. Once I completed exposure of the fracture site, I found that this was an transverse   fracture through the distal radius that also had to vertical intra-articular fracture lines with a separate radial styloid fracture fragment as well as a central depressed fragment of the articular surface making this a 3 part intra-articular distal radius fracture. I first turned my attention to reduction of the distal fragment in line with the radial shaft. I used an elevator to  elevate the fracture into near anatomic alignment. I then confirmed  reduction under fluoroscopy in the AP and lateral planes. I then  selected a AAP 4-hole wide distal volar radial locking plate, which was  positioned on the volar aspect of the distal radius. I confirmed  position of the implant under fluoroscopy. I then drilled and 2 cortical screws into the distal ulnar fragment compressing it up against the plate. I then drilled and placed 2 locking screws into the distal ulnar fragment. I then remove the initially placed 2 cortical screws and replace them with locking screws due to the soft bone quality.   With this fixation in place I then used the plate to apply traction, ulnar deviation and volar angulation to the plate and drilled and placed a cortical screw through the oblong hole in the plate compressing it up against the radial shaft facilitating reduction. With this initial fixation plates I confirm near anatomic alignment as well as maintenance of reduction under fluoroscopy in the AP and lateral planes. I then turned my attention to the central intra-articular fragment. I used an elevator to elevate the articular surface. I then drilled and placed an additional locking screw in this location to act in rafting fashion. I then turned attention to the radial styloid fracture fragment. Given the amount of displacement I did elevate the brachial radialis off of the distal volar fragment of the radial styloid. I then used a sharp bone reduction clamp to reduce the radial styloid fragment in line with the previously fixed portion of the distal radius fragment. I then drilled and placed 2 locking screws into the radial styloid to complete fixation distally. I then drilled and placed 3 additional locking screws into the radial shaft to complete fixation. The bone reduction clamp was removed and the fracture reduction was maintained. With all fixation in place, I confirmed reduction  under fluoroscopy as well as maintenance of reduction under fluoroscopy  in the AP and lateral planes. The wound was then irrigated with sterile normal saline. I then re-approximated the subcutaneous tissue using 2-0 Vicryl suture  followed by skin closure with 3-0 nylon. Tourniquet was then deflated  for a total of 60 minutes and adequate hemostasis was maintained. I  then applied a sterile soft dressing to the left upper extremity  followed by a well-padded short volar wrist splint. The patient was  then awakened from anesthesia and transported to PACU in stable  condition. She appeared to have tolerated the procedure the well.     PROGNOSIS:  At this point, she will be discharged to home on a short  course of oral antibiotics as well as pain medication. She is to  maintain her splint clean, dry, and intact at all times and is to have  no lifting, pushing, or pulling with the left arm. I will see her back  in the office in 2 weeks for suture removal and will continue to monitor  her progress in the outpatient setting for radiographic evidence of  healing and resolution of her symptoms.         Lesia Jimenez, DO

## 2021-02-16 NOTE — BRIEF OP NOTE
Brief Postoperative Note      Patient: Racquel Cabrera  YOB: 1965  MRN: [id number]    Date of Procedure: 2/16/2021    Pre-Op Diagnosis: TRAUMATIC CLOSED DISPLACED FRACTURE OF DISTAL END OF LEFT RADIUS, ULNA    Post-Op Diagnosis: Same       Procedure(s):  LEFT WRIST OPEN REDUCTION INTERNAL FIXATION    Surgeon(s):  Eladio Santiago DO    Assistant:  Physician Assistant: Griselda Lopez PA-C    Anesthesia: General    Estimated Blood Loss (mL): Minimal    Complications: None    Specimens:   * No specimens in log *    Implants:  Implant Name Type Inv. Item Serial No.  Lot No. LRB No. Used Action   SCREW BONE L14MM OD2. 5MM TI JUNE SM HD T8 ST  SCREW BONE L14MM OD2. 5MM TI JUNE SM HD T8 ST  [employer]WD  Left 1 Implanted   SCREW BONE L14MM OD2. 5MM TI JUNE T8 LCK LOQ[employer] VA  SCREW BONE L14MM OD2. 5MM TI JUNE T8 LCK LOQ[employer] VA  [employer]WD  Left 2 Implanted   SCREW BONE L16MM OD2. 5MM TI JUNE T8 LCK [employer] VA  SCREW BONE L16MM OD2. 5MM TI JUNE T8 LCK LOQTEQ VA  [employer]WD  Left 1 Implanted   SCREW BONE L18MM OD2. 5MM TI JUNE T8 LCK [employer] VA  SCREW BONE L18MM OD2. 5MM TI JUNE T8 LCK LOQ[employer]Q VA  [employer]WD  Left 1 Implanted   SCREW BONE L20MM OD2. 5MM TI JUNE T8 LCK LOQ[employer] VA  SCREW BONE L20MM OD2. 5MM TI JUNE T8 LCK LOQTEQ VA  [employer]WD  Left 1 Implanted   SCREW BONE L22MM OD2. 5MM TI JUNE T8 LCK [employer] VA  SCREW BONE L22MM OD2. 5MM TI JUNE T8 LCK LOQTEQ VA  [employer]WD  Left 2 Implanted   LOQTEC VA Cortical Screw 2.5 L24    [employer]-PMM  Left 4 Implanted   LOQTE[employer] VA Volar Distal Radius Plate 2.5, broad 4 Holes Left    [employer]-PMM  Left 1 Implanted         Drains: * No LDAs found *    Findings: L DRF    Electronically signed by Maria Isabel Fraire DO on 2/16/2021 at 2:39 PM

## 2021-02-16 NOTE — PROGRESS NOTES
1520:  Pt discharged to home alert and oriented with with no c/o pain or discomfort to left wrist.  ACE wrap drsg in place to left wrist with no bleeding or drainage noted. Pt states that arm remains completely numb. Fingers warm to the touch. Pt transferred to V with out incident.

## 2021-03-01 ENCOUNTER — OFFICE VISIT (OUTPATIENT)
Dept: ORTHOPEDIC SURGERY | Age: 56
End: 2021-03-01

## 2021-03-01 VITALS
HEIGHT: 67 IN | BODY MASS INDEX: 31.39 KG/M2 | RESPIRATION RATE: 15 BRPM | OXYGEN SATURATION: 98 % | WEIGHT: 200 LBS | HEART RATE: 97 BPM

## 2021-03-01 DIAGNOSIS — Z09 POSTOP CHECK: Primary | ICD-10-CM

## 2021-03-01 DIAGNOSIS — Z87.81 S/P ORIF (OPEN REDUCTION INTERNAL FIXATION) FRACTURE: ICD-10-CM

## 2021-03-01 DIAGNOSIS — Z09 S/P ORTHOPEDIC SURGERY, FOLLOW-UP EXAM: ICD-10-CM

## 2021-03-01 DIAGNOSIS — Z98.890 S/P ORIF (OPEN REDUCTION INTERNAL FIXATION) FRACTURE: ICD-10-CM

## 2021-03-01 PROCEDURE — 99024 POSTOP FOLLOW-UP VISIT: CPT | Performed by: ORTHOPAEDIC SURGERY

## 2021-03-01 RX ORDER — AMOXICILLIN 500 MG/1
CAPSULE ORAL
COMMUNITY
Start: 2020-12-03

## 2021-03-01 NOTE — PROGRESS NOTES
Pt returns to the office today for post op check of the left ORIF DOS 2/16/21. Pt states pain today is a 3/10. Pt states overall she is doing well. She does have increase pain with rotation of the left wrist. Pt states she is taking tylenol for the pain which is helping. Pt denies any new injury or accident.

## 2021-03-01 NOTE — PATIENT INSTRUCTIONS
Continue nonweight-bearing as tolerated of the left arm  Continue range of motion exercises as instructed. Ice and elevate as needed. Tylenol or Motrin for pain.   stitches removed today   Keep incision dry and clean at all times   Follow up in 4 weeks

## 2021-03-02 ASSESSMENT — ENCOUNTER SYMPTOMS
SHORTNESS OF BREATH: 0
COLOR CHANGE: 0

## 2021-03-02 NOTE — PROGRESS NOTES
Subjective:      Patient ID: Breck Lombard is a 54 y.o. female. Pt returns to the office today for post op check of the left ORIF DOS 2/16/21. Pt states pain today is a 3/10. Pt states overall she is doing well. She does have increase pain with rotation of the left wrist. Pt states she is taking tylenol for the pain which is helping. Pt denies any new injury or accident. She comes in today for her 2-week postop recheck. Overall she states that she is feeling much better than she was before surgery. She has kept her splint on and has worked on range of motion exercises for her fingers. Patient denies any new injury to the involved extremity/ joint, denies numbness or tingling in the involved extremity and denies fever or chills. Review of Systems   Constitutional: Negative for activity change, chills and fever. Respiratory: Negative for shortness of breath. Cardiovascular: Negative for chest pain. Musculoskeletal: Positive for arthralgias, joint swelling and myalgias. Negative for gait problem. Skin: Negative for color change, pallor, rash and wound. Neurological: Positive for weakness. Negative for numbness. Past Medical History:   Diagnosis Date    History of blood transfusion     1983       Objective:   Physical Exam  Constitutional:       Appearance: She is well-developed. HENT:      Head: Normocephalic and atraumatic. Eyes:      Pupils: Pupils are equal, round, and reactive to light. Neck:      Musculoskeletal: Normal range of motion. Pulmonary:      Effort: Pulmonary effort is normal.   Musculoskeletal:         General: Swelling and tenderness present. No deformity or signs of injury. Right elbow: Normal.     Left elbow: Normal.      Right wrist: She exhibits normal range of motion, no tenderness, no bony tenderness, no swelling, no effusion, no crepitus, no deformity and no laceration.       Left wrist: She exhibits decreased range of motion, tenderness, bony tenderness and swelling. She exhibits no effusion, no crepitus, no deformity and no laceration. Skin:     General: Skin is warm and dry. Capillary Refill: Capillary refill takes less than 2 seconds. Coloration: Skin is not pale. Findings: No erythema or rash. Neurological:      Mental Status: She is alert and oriented to person, place, and time. Left wrist-Incision clean, dry, intact, with no erythema, no drainage, and no signs of infection. Sutures present and removed today. Flexion 20  Extension 20    Xr Wrist Left (min 3 Views)    Result Date: 3/1/2021  XRAY X-ray 3 views of the left wrist obtained and reviewed by me today in the office demonstrates age appropriate bone density throughout with intact plate and screw construct across the comminuted intra-articular fracture of the distal radius which remains in near anatomic alignment, there has been no new displacement or angulation compared to prior x-rays. Impression: Stable left intra-articular distal radius fracture status post ORIF. Assessment:      Left distal radius fracture ORIF, 2 weeks      Plan:      I discussed with her today her x-ray findings. I explained her that her implants are stable and her fracture means a good alignment. I discussed with her today that she is progressing very well. At this point she can begin range of motion exercises for the left wrist as instructed. No lifting, pushing, pulling with affected arm. I discussed with the patient today that their are symptoms are normal and should improve with time. Tylenol or Motrin as needed. Ice and elevate as needed. Follow-up in 1 month for recheck with x-rays of the left wrist and if needed will consider formal occupational therapy.           Lesia 97, DO

## 2021-03-29 ENCOUNTER — OFFICE VISIT (OUTPATIENT)
Dept: ORTHOPEDIC SURGERY | Age: 56
End: 2021-03-29

## 2021-03-29 VITALS — HEIGHT: 67 IN | WEIGHT: 200 LBS | RESPIRATION RATE: 14 BRPM | BODY MASS INDEX: 31.39 KG/M2

## 2021-03-29 DIAGNOSIS — Z87.81 S/P ORIF (OPEN REDUCTION INTERNAL FIXATION) FRACTURE: Primary | ICD-10-CM

## 2021-03-29 DIAGNOSIS — Z98.890 S/P ORIF (OPEN REDUCTION INTERNAL FIXATION) FRACTURE: Primary | ICD-10-CM

## 2021-03-29 DIAGNOSIS — Z09 S/P ORTHOPEDIC SURGERY, FOLLOW-UP EXAM: ICD-10-CM

## 2021-03-29 DIAGNOSIS — Z09 POSTOP CHECK: ICD-10-CM

## 2021-03-29 PROCEDURE — 99024 POSTOP FOLLOW-UP VISIT: CPT | Performed by: ORTHOPAEDIC SURGERY

## 2021-03-29 ASSESSMENT — ENCOUNTER SYMPTOMS
COLOR CHANGE: 0
SHORTNESS OF BREATH: 0

## 2021-03-29 NOTE — PROGRESS NOTES
Subjective:      Patient ID: Samina Almanza is a 54 y.o. female. Patient is here today for a 6 weeks check for her left wrist ORIF. She states that she is doing well post op. She is currently NWB and has complaints of stiffness. Pain level 2/10. She comes in today for her 6-week postop recheck. She states that overall she is feeling much better and she has been working on range of motion exercises for the left wrist.  She does continue to have some weakness with  strength but this is improving. Patient denies any new injury to the involved extremity/ joint, denies numbness or tingling in the involved extremity and denies fever or chills. Review of Systems   Constitutional: Negative for activity change, chills and fever. Respiratory: Negative for shortness of breath. Cardiovascular: Negative for chest pain. Musculoskeletal: Negative for arthralgias, gait problem, joint swelling and myalgias. Skin: Negative for color change, pallor, rash and wound. Neurological: Positive for weakness. Negative for numbness. Past Medical History:   Diagnosis Date    History of blood transfusion     1983       Objective:   Physical Exam  Constitutional:       Appearance: She is well-developed. HENT:      Head: Normocephalic and atraumatic. Eyes:      Pupils: Pupils are equal, round, and reactive to light. Neck:      Musculoskeletal: Normal range of motion. Pulmonary:      Effort: Pulmonary effort is normal.   Musculoskeletal:         General: No swelling, tenderness, deformity or signs of injury. Right elbow: Normal.     Left elbow: Normal.      Right wrist: She exhibits normal range of motion, no tenderness, no bony tenderness, no swelling, no effusion, no crepitus, no deformity and no laceration. Left wrist: She exhibits decreased range of motion. She exhibits no tenderness, no bony tenderness, no swelling, no effusion, no crepitus, no deformity and no laceration.    Skin: General: Skin is warm and dry. Capillary Refill: Capillary refill takes less than 2 seconds. Coloration: Skin is not pale. Findings: No erythema or rash. Neurological:      Mental Status: She is alert and oriented to person, place, and time. Left wrist-Incision clean, dry, intact, with no erythema, no drainage, and no signs of infection. Flexion 50  Extension 60   strength 4/5. Xr Wrist Left (min 3 Views)    Result Date: 3/29/2021  XRAY X-ray 3 views of the left wrist obtained and reviewed by me today in the office demonstrates age appropriate bone density throughout with intact plate and screw construct across the comminuted intra-articular fracture of the distal radius which remains in near anatomic alignment, there has been no new displacement or angulation compared to prior x-rays with normal interval callus formation, no subluxation or dislocation noted. Impression: Stable left intra-articular distal radius fracture status post ORIF with routine healing. Assessment:      Left distal radius fracture ORIF, 6 weeks      Plan:      I discussed with her today her x-ray findings. I explained her that her implants are stable and her fracture means a good alignment and her fracture is healing well. I discussed with her today that she is continuing to progress very well. I reassured her today that this can take 1 year to recover from. At this point she can begin lifting, pushing, pulling as tolerated with the left hand. Continue weight-bearing as tolerated. Continue range of motion exercises as instructed. Ice and elevate as needed. Tylenol or Motrin for pain.   Follow up in 6 weeks for recheck with x-rays of the left wrist.  We will get her started in formal occupational therapy for strengthening and range of motion of the left wrist.            Lesia Jimenez, DO

## 2021-05-06 ENCOUNTER — TELEPHONE (OUTPATIENT)
Dept: ORTHOPEDIC SURGERY | Age: 56
End: 2021-05-06

## 2021-05-10 ENCOUNTER — OFFICE VISIT (OUTPATIENT)
Dept: ORTHOPEDIC SURGERY | Age: 56
End: 2021-05-10

## 2021-05-10 VITALS
HEART RATE: 107 BPM | OXYGEN SATURATION: 96 % | WEIGHT: 200 LBS | BODY MASS INDEX: 31.39 KG/M2 | HEIGHT: 67 IN | RESPIRATION RATE: 15 BRPM

## 2021-05-10 DIAGNOSIS — Z87.81 S/P ORIF (OPEN REDUCTION INTERNAL FIXATION) FRACTURE: ICD-10-CM

## 2021-05-10 DIAGNOSIS — Z98.890 S/P ORIF (OPEN REDUCTION INTERNAL FIXATION) FRACTURE: ICD-10-CM

## 2021-05-10 DIAGNOSIS — Z09 POSTOP CHECK: Primary | ICD-10-CM

## 2021-05-10 DIAGNOSIS — Z09 S/P ORTHOPEDIC SURGERY, FOLLOW-UP EXAM: ICD-10-CM

## 2021-05-10 PROCEDURE — 99024 POSTOP FOLLOW-UP VISIT: CPT | Performed by: ORTHOPAEDIC SURGERY

## 2021-05-10 NOTE — PROGRESS NOTES
Patient returns to the office today for 12 week post op check of the left wrist ORIF DOS 2/16/21. Pt states pain today is a 0/10 she is now able to lift up a milk jug but is still having a difficult time lifting up a cookie sheet. Pt states overall she is doing well with her healing process.  Pt states she is stiff in the left wrist mainly in the morning

## 2021-05-11 ASSESSMENT — ENCOUNTER SYMPTOMS
COLOR CHANGE: 0
SHORTNESS OF BREATH: 0

## 2021-05-11 NOTE — PROGRESS NOTES
Subjective:      Patient ID: Darin Tim is a 54 y.o. female. Patient returns to the office today for 12 week post op check of the left wrist ORIF DOS 2/16/21. Pt states pain today is a 0/10 she is now able to lift up a milk jug but is still having a difficult time lifting up a cookie sheet. Pt states overall she is doing well with her healing process. Pt states she is stiff in the left wrist mainly in the morning    She comes in today for her 3-month postop recheck. She states that overall she is feeling much better and she has been working on range of motion exercises for the left wrist.  She states that she has even begun lifting some objects with her left hand without much difficulty and she is very happy with her progress and result. Patient denies any new injury to the involved extremity/ joint, denies numbness or tingling in the involved extremity and denies fever or chills. Review of Systems   Constitutional: Negative for activity change, chills and fever. Respiratory: Negative for shortness of breath. Cardiovascular: Negative for chest pain. Musculoskeletal: Negative for arthralgias, gait problem, joint swelling and myalgias. Skin: Negative for color change, pallor, rash and wound. Neurological: Negative for weakness and numbness. Past Medical History:   Diagnosis Date    History of blood transfusion     1983       Objective:   Physical Exam  Constitutional:       Appearance: She is well-developed. HENT:      Head: Normocephalic and atraumatic. Eyes:      Pupils: Pupils are equal, round, and reactive to light. Neck:      Musculoskeletal: Normal range of motion. Pulmonary:      Effort: Pulmonary effort is normal.   Musculoskeletal: Normal range of motion. General: No swelling, tenderness, deformity or signs of injury.       Right elbow: Normal.     Left elbow: Normal.      Right wrist: She exhibits normal range of motion, no tenderness, no bony tenderness, no swelling, no effusion, no crepitus, no deformity and no laceration. Left wrist: She exhibits normal range of motion, no tenderness, no bony tenderness, no swelling, no effusion, no crepitus, no deformity and no laceration. Skin:     General: Skin is warm and dry. Capillary Refill: Capillary refill takes less than 2 seconds. Coloration: Skin is not pale. Findings: No erythema or rash. Neurological:      Mental Status: She is alert and oriented to person, place, and time. Left wrist-Incision clean, dry, intact, with no erythema, no drainage, and no signs of infection. Flexion 90  Extension 90   strength 5/5. Xr Wrist Left (min 3 Views)    Result Date: 5/10/2021  XRAY X-ray 3 views of the left wrist obtained and reviewed by me today in the office demonstrates age appropriate bone density throughout with intact plate and screw construct across the comminuted intra-articular distal radius fracture which remains in near anatomic alignment, there has been early interval callus formation with no new displacement or angulation compared to prior x-rays. Impression: Stable left intra-articular distal radius fracture status post ORIF with routine healing. Assessment:      Left distal radius fracture ORIF, 3 months       Plan:      I discussed with her today her x-ray findings. I explained her that her implants are stable and her fracture means a good alignment and her fracture is healing slowly but well. I discussed with her today that she is continuing to progress extremely well. I reassured her today that this can take 1 year to recover from. Continue weight-bearing as tolerated. Continue range of motion exercises as instructed. Ice and elevate as needed. Tylenol or Motrin for pain. Follow up as needed.                 Lesia Jimenez, DO

## 2023-05-03 ENCOUNTER — OFFICE VISIT (OUTPATIENT)
Dept: FAMILY MEDICINE CLINIC | Age: 58
End: 2023-05-03
Payer: MEDICAID

## 2023-05-03 VITALS
OXYGEN SATURATION: 95 % | HEART RATE: 102 BPM | WEIGHT: 217 LBS | DIASTOLIC BLOOD PRESSURE: 76 MMHG | BODY MASS INDEX: 33.99 KG/M2 | SYSTOLIC BLOOD PRESSURE: 122 MMHG | TEMPERATURE: 98.1 F

## 2023-05-03 DIAGNOSIS — H60.63 CHRONIC OTITIS EXTERNA OF BOTH EARS, UNSPECIFIED TYPE: ICD-10-CM

## 2023-05-03 DIAGNOSIS — H60.501 ACUTE OTITIS EXTERNA OF RIGHT EAR, UNSPECIFIED TYPE: Primary | ICD-10-CM

## 2023-05-03 PROCEDURE — 4004F PT TOBACCO SCREEN RCVD TLK: CPT | Performed by: NURSE PRACTITIONER

## 2023-05-03 PROCEDURE — 99203 OFFICE O/P NEW LOW 30 MIN: CPT | Performed by: NURSE PRACTITIONER

## 2023-05-03 PROCEDURE — G8417 CALC BMI ABV UP PARAM F/U: HCPCS | Performed by: NURSE PRACTITIONER

## 2023-05-03 PROCEDURE — G8427 DOCREV CUR MEDS BY ELIG CLIN: HCPCS | Performed by: NURSE PRACTITIONER

## 2023-05-03 PROCEDURE — 4130F TOPICAL PREP RX AOE: CPT | Performed by: NURSE PRACTITIONER

## 2023-05-03 PROCEDURE — 3017F COLORECTAL CA SCREEN DOC REV: CPT | Performed by: NURSE PRACTITIONER

## 2023-05-03 RX ORDER — AMOXICILLIN AND CLAVULANATE POTASSIUM 875; 125 MG/1; MG/1
1 TABLET, FILM COATED ORAL 2 TIMES DAILY
Qty: 20 TABLET | Refills: 0 | Status: SHIPPED | OUTPATIENT
Start: 2023-05-03 | End: 2023-05-13

## 2023-05-03 RX ORDER — FAMOTIDINE 20 MG/1
20 TABLET, FILM COATED ORAL 2 TIMES DAILY
COMMUNITY

## 2023-05-03 RX ORDER — ACETAMINOPHEN 500 MG
1000 TABLET ORAL EVERY 6 HOURS PRN
COMMUNITY

## 2023-05-03 RX ORDER — CIPROFLOXACIN AND DEXAMETHASONE 3; 1 MG/ML; MG/ML
4 SUSPENSION/ DROPS AURICULAR (OTIC) 2 TIMES DAILY
Qty: 7.5 ML | Refills: 0 | Status: SHIPPED | OUTPATIENT
Start: 2023-05-03 | End: 2023-05-10

## 2023-05-03 ASSESSMENT — ENCOUNTER SYMPTOMS
WHEEZING: 0
COUGH: 0
DIARRHEA: 0
SINUS PRESSURE: 0
SINUS PAIN: 0
SHORTNESS OF BREATH: 0
NAUSEA: 0
CHEST TIGHTNESS: 0
VOMITING: 0
SORE THROAT: 0
RHINORRHEA: 0

## 2023-05-03 NOTE — PROGRESS NOTES
days  Dispense: 7.5 mL; Refill: 0  - amoxicillin-clavulanate (AUGMENTIN) 875-125 MG per tablet; Take 1 tablet by mouth 2 times daily for 10 days  Dispense: 20 tablet; Refill: 0    2. Chronic otitis externa of both ears, unspecified type  See above. Since been off and on for six months will refer to ENT.   - External Referral To ENT          Medications Discontinued During This Encounter   Medication Reason    pantoprazole (PROTONIX) 20 MG tablet LIST CLEANUP    VITAMIN D PO LIST CLEANUP    amoxicillin (AMOXIL) 500 MG capsule LIST CLEANUP           Care discussed with patient. Questions answered. Patient verbalizes understanding and agrees with plan. After visit summary provided. Advised to call for any problems, questions, or concerns. Return if symptoms worsen or fail to improve.                                              Signed:  JOANN Pulido CNP  05/03/23  1:42 PM

## 2023-12-07 ENCOUNTER — OFFICE VISIT (OUTPATIENT)
Dept: FAMILY MEDICINE CLINIC | Age: 58
End: 2023-12-07
Payer: MEDICAID

## 2023-12-07 VITALS
OXYGEN SATURATION: 95 % | TEMPERATURE: 97.7 F | SYSTOLIC BLOOD PRESSURE: 122 MMHG | HEART RATE: 105 BPM | WEIGHT: 163 LBS | DIASTOLIC BLOOD PRESSURE: 76 MMHG | BODY MASS INDEX: 25.53 KG/M2

## 2023-12-07 DIAGNOSIS — K21.9 GASTROESOPHAGEAL REFLUX DISEASE, UNSPECIFIED WHETHER ESOPHAGITIS PRESENT: Primary | ICD-10-CM

## 2023-12-07 PROCEDURE — G8417 CALC BMI ABV UP PARAM F/U: HCPCS | Performed by: NURSE PRACTITIONER

## 2023-12-07 PROCEDURE — 3017F COLORECTAL CA SCREEN DOC REV: CPT | Performed by: NURSE PRACTITIONER

## 2023-12-07 PROCEDURE — 4004F PT TOBACCO SCREEN RCVD TLK: CPT | Performed by: NURSE PRACTITIONER

## 2023-12-07 PROCEDURE — 99213 OFFICE O/P EST LOW 20 MIN: CPT | Performed by: NURSE PRACTITIONER

## 2023-12-07 PROCEDURE — G8427 DOCREV CUR MEDS BY ELIG CLIN: HCPCS | Performed by: NURSE PRACTITIONER

## 2023-12-07 PROCEDURE — G8484 FLU IMMUNIZE NO ADMIN: HCPCS | Performed by: NURSE PRACTITIONER

## 2023-12-07 RX ORDER — OMEPRAZOLE 40 MG/1
40 CAPSULE, DELAYED RELEASE ORAL 2 TIMES DAILY
Qty: 30 CAPSULE | Refills: 0 | Status: SHIPPED | OUTPATIENT
Start: 2023-12-07

## 2023-12-07 RX ORDER — TRIAMCINOLONE ACETONIDE 1 MG/G
OINTMENT TOPICAL
COMMUNITY
Start: 2023-05-26

## 2023-12-07 RX ORDER — OMEPRAZOLE 20 MG/1
40 CAPSULE, DELAYED RELEASE ORAL 2 TIMES DAILY
COMMUNITY
End: 2023-12-07 | Stop reason: SDUPTHER

## 2023-12-07 NOTE — PROGRESS NOTES
Cecille Alvarez   62 y.o.  female  [id number]      Chief Complaint   Patient presents with    Nausea     With reflux feeling         Subjective:  62 y. o.female is here for a follow up. She has the following chronic/acute medical problems:  Patient Active Problem List   Diagnosis    Closed fracture of left distal radius and ulna       Patient is here with complaints of reflux. Patient states it has been going on for about a month and half. Patient states it started when she would eat and occasionally would vomit after eating. Patient states it feels like reflux because it burns. Patient states she has epigastric discomfort. Patient states she was taking Pepcid - states she has taken this for about a year and half. Patient states she stopped the Pepcid last week. Patient states she started taking the omeprazole three days ago - states she has been taking 40 mg BID. Patient states she has noticed that the epigastric area is better. Patient states yesterday she was able to eat yesterday and did not vomit. Patient states she has not ate today. Review of Systems   Constitutional:  Positive for appetite change (decreased). Negative for chills, fatigue and fever. HENT:  Negative for congestion, ear pain, postnasal drip, rhinorrhea, sinus pressure, sinus pain, sneezing and sore throat. Respiratory:  Negative for cough, chest tightness, shortness of breath and wheezing. Cardiovascular:  Negative for chest pain and palpitations. Gastrointestinal:  Positive for nausea and vomiting. Negative for abdominal pain, constipation and diarrhea. Skin:  Negative for rash. Neurological:  Negative for dizziness, light-headedness and headaches.        Current Outpatient Medications   Medication Sig Dispense Refill    triamcinolone (KENALOG) 0.1 % ointment apply a thin layer to the affected area(s) topical route Twice a day for 7 day(s)      omeprazole (PRILOSEC) 40 MG delayed release capsule Take 1 capsule by mouth in

## 2023-12-11 ENCOUNTER — TELEPHONE (OUTPATIENT)
Dept: GASTROENTEROLOGY | Age: 58
End: 2023-12-11

## 2023-12-11 NOTE — TELEPHONE ENCOUNTER
Called pt. In regards to a referral for GERD.  Made appt for pt to see dr. Hiwot Bartholomew on 1/4/24 @8:10am

## 2023-12-29 ENCOUNTER — HOSPITAL ENCOUNTER (OUTPATIENT)
Age: 58
Setting detail: SPECIMEN
Discharge: HOME | End: 2023-12-29
Payer: MEDICAID

## 2023-12-29 PROCEDURE — 88360 TUMOR IMMUNOHISTOCHEM/MANUAL: CPT | Performed by: PATHOLOGY

## 2023-12-29 PROCEDURE — 88342 IMHCHEM/IMCYTCHM 1ST ANTB: CPT | Performed by: PATHOLOGY

## 2023-12-29 PROCEDURE — 88341 IMHCHEM/IMCYTCHM EA ADD ANTB: CPT | Performed by: PATHOLOGY

## 2024-01-08 ENCOUNTER — TELEPHONE (OUTPATIENT)
Dept: FAMILY MEDICINE CLINIC | Age: 59
End: 2024-01-08

## 2024-01-08 NOTE — TELEPHONE ENCOUNTER
----- Message from Steffany Pringle sent at 1/8/2024 10:27 AM EST -----  Subject: Appointment Request    Reason for Call: New Patient/New to Provider Appointment needed: New   Patient Request Appointment    QUESTIONS    Reason for appointment request? Requested Provider unavailable - Garland Eric MD     Additional Information for Provider? Pt would like to schedule a soon appt   with requested PCP. Pt has cancer, and needs referrals and to have a PCP.   Please contact Pt as soon as possible to confirm if PCP has soon   availability or if he booked out a great ways so that Pt can know what she   needs to do as far as getting an PCP for her Chronic Condition.   ---------------------------------------------------------------------------  --------------  CALL BACK INFO  [phone]; OK to leave message on voicemail,Do not leave any message,   patient will call back for answer  ---------------------------------------------------------------------------  --------------  SCRIPT ANSWERS

## 2024-01-18 ENCOUNTER — TELEPHONE (OUTPATIENT)
Dept: ONCOLOGY | Age: 59
End: 2024-01-18

## 2024-01-25 ENCOUNTER — CLINICAL DOCUMENTATION (OUTPATIENT)
Dept: ONCOLOGY | Age: 59
End: 2024-01-25

## 2024-01-25 ENCOUNTER — HOSPITAL ENCOUNTER (OUTPATIENT)
Dept: RADIATION ONCOLOGY | Age: 59
Discharge: HOME | End: 2024-01-25
Payer: MEDICAID

## 2024-01-25 ENCOUNTER — HOSPITAL ENCOUNTER (OUTPATIENT)
Dept: INFUSION THERAPY | Age: 59
Discharge: HOME | End: 2024-01-25

## 2024-01-25 ENCOUNTER — INITIAL CONSULT (OUTPATIENT)
Dept: ONCOLOGY | Age: 59
End: 2024-01-25
Payer: MEDICAID

## 2024-01-25 VITALS
OXYGEN SATURATION: 96 % | BODY MASS INDEX: 23.79 KG/M2 | TEMPERATURE: 97.7 F | DIASTOLIC BLOOD PRESSURE: 62 MMHG | HEART RATE: 99 BPM | HEIGHT: 68 IN | WEIGHT: 157 LBS | SYSTOLIC BLOOD PRESSURE: 104 MMHG

## 2024-01-25 DIAGNOSIS — C15.9 ESOPHAGEAL ADENOCARCINOMA (HCC): Primary | ICD-10-CM

## 2024-01-25 DIAGNOSIS — Z11.59 SCREENING FOR VIRAL DISEASE: ICD-10-CM

## 2024-01-25 PROCEDURE — 4004F PT TOBACCO SCREEN RCVD TLK: CPT | Performed by: INTERNAL MEDICINE

## 2024-01-25 PROCEDURE — G8427 DOCREV CUR MEDS BY ELIG CLIN: HCPCS | Performed by: INTERNAL MEDICINE

## 2024-01-25 PROCEDURE — 99205 OFFICE O/P NEW HI 60 MIN: CPT | Performed by: RADIOLOGY

## 2024-01-25 PROCEDURE — G8484 FLU IMMUNIZE NO ADMIN: HCPCS | Performed by: INTERNAL MEDICINE

## 2024-01-25 PROCEDURE — 3017F COLORECTAL CA SCREEN DOC REV: CPT | Performed by: INTERNAL MEDICINE

## 2024-01-25 PROCEDURE — 99211 OFF/OP EST MAY X REQ PHY/QHP: CPT

## 2024-01-25 PROCEDURE — 99205 OFFICE O/P NEW HI 60 MIN: CPT | Performed by: INTERNAL MEDICINE

## 2024-01-25 PROCEDURE — G8420 CALC BMI NORM PARAMETERS: HCPCS | Performed by: INTERNAL MEDICINE

## 2024-01-25 RX ORDER — NICOTINE 21 MG/24HR
1 PATCH, TRANSDERMAL 24 HOURS TRANSDERMAL EVERY 24 HOURS
COMMUNITY

## 2024-01-25 RX ORDER — M-VIT,TX,IRON,MINS/CALC/FOLIC 27MG-0.4MG
1 TABLET ORAL DAILY
COMMUNITY

## 2024-01-25 RX ORDER — ONDANSETRON 4 MG/1
4 TABLET, FILM COATED ORAL EVERY 8 HOURS PRN
COMMUNITY

## 2024-01-25 ASSESSMENT — PATIENT HEALTH QUESTIONNAIRE - PHQ9
SUM OF ALL RESPONSES TO PHQ QUESTIONS 1-9: 0
SUM OF ALL RESPONSES TO PHQ QUESTIONS 1-9: 0
SUM OF ALL RESPONSES TO PHQ9 QUESTIONS 1 & 2: 0
SUM OF ALL RESPONSES TO PHQ QUESTIONS 1-9: 0
1. LITTLE INTEREST OR PLEASURE IN DOING THINGS: 0
2. FEELING DOWN, DEPRESSED OR HOPELESS: 0
SUM OF ALL RESPONSES TO PHQ QUESTIONS 1-9: 0

## 2024-01-25 NOTE — PROGRESS NOTES
Received VM from [facility] to confirm orders received, but now needing to verify address to send block. Attempted to call number provided @ [phone], but appears to be fax number based on sounding tone.

## 2024-01-25 NOTE — PROGRESS NOTES
Denisa Basurto  1/25/2024    CONSULT / ESOPHAGUS    There were no vitals filed for this visit.     Wt Readings from Last 3 Encounters:   01/25/24 71.2 kg (157 lb)   12/07/23 73.9 kg (163 lb)   05/03/23 98.4 kg (217 lb)      Fall Risk:    Not currently a fall risk  Re-Evaluate Weekly    Nutritional Alteration:  Loss of Appetite  Severe Dysphagia  Tube Feed Required via peg tube    Mediport: no    Pacemaker/ICD: no    Implants: yes, peg tube / ORIF Left Wrist    Previous XRT: no    Assessment: Pt here to discuss radiation treatment options with .      Past Medical History:   Diagnosis Date    History of blood transfusion     1983       Past Surgical History:   Procedure Laterality Date    ASD REPAIR      1969 repaired hole in heart    TONSILLECTOMY      age 8    WRIST FRACTURE SURGERY Left 2/16/2021    LEFT WRIST OPEN REDUCTION INTERNAL FIXATION performed by Guido Walsh DO at [facility] OR       No Known Allergies       Current Outpatient Medications:     nicotine (NICODERM CQ) 21 MG/24HR, Place 1 patch onto the skin every 24 hours, Disp: , Rfl:     Multiple Vitamins-Minerals (THERAPEUTIC MULTIVITAMIN-MINERALS) tablet, Take 1 tablet by mouth daily, Disp: , Rfl:     ondansetron (ZOFRAN) 4 MG tablet, Take 1 tablet by mouth every 8 hours as needed for Nausea or Vomiting, Disp: , Rfl:     omeprazole (PRILOSEC) 40 MG delayed release capsule, Take 1 capsule by mouth in the morning and at bedtime, Disp: 30 capsule, Rfl: 0    cetirizine (ZYRTEC) 10 MG tablet, Take 1 tablet by mouth daily, Disp: , Rfl:         Electronically signed by Padmaja Ferrer RN on 1/25/2024 at 11:15 AM

## 2024-01-25 NOTE — PROGRESS NOTES
MA Rooming Questions  Patient: Denisa Basurto  MRN: [id number]    Date: 1/25/2024        New Patient        5. Did the patient have a depression screening completed today? Yes    PHQ-9 Total Score: 0 (1/25/2024  8:53 AM)       PHQ-9 Given to (if applicable):               PHQ-9 Score (if applicable):                     [] Positive     [x]  Negative              Does question #9 need addressed (if applicable)                     [] Yes    []  No               Kelsie Ayala CMA

## 2024-01-25 NOTE — PROGRESS NOTES
Physician requesting MSI, PD-L1, and HER2 on specimen from esophageal biopsy completed back in December 2023 at [facility]. Called [facility] @ [phone] and transferred to pathology to obtain fax number to send orders. Pathology confirmed they will be able to do additional testing. Case# [id number]. Orders faxed to [phone] successfully to number provided.

## 2024-01-25 NOTE — CONSULTS
Radiation Oncology Consultation  Encounter Date: 2024 10:00 AM    Ms. Denisa Basurto is a 58 y.o. female  : 1965  MRN: [id number]  [id number]  Requesting Provider: No att. providers found        CONSULTANT: Tyler Sands MD    PHYSICIANS:   Dr. 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 Basurto is a 58-year-old female who presents for consultation of radiotherapy treatment options above diagnosis.    She presented to ED with nausea vomiting and poor oral intake.  She is having stomach issues since about October.  She has been unable to keep solids down and has about a 40 pound weight loss.    CT abdomen pelvis done on 2023 showed a heterogeneous solid mass in the gastric cardia extending to the gastroesophageal junction measuring about 6.6 x 6.8 x 7.7 cm.  CT chest done 2023 showed a 2.4 cm noncalcified pulmonary nodule within the left upper lobe as well as an additional 1.6 cm nodule in the left upper lobe.    Dr. Martinez attempted an EGD on 2023 and found obstructing distal esophageal mass is ulcerated and necrotic was unable to pass a pediatric scope beyond the mass.  Biopsy was done and pathology revealed moderately differentiated adenocarcinoma surgical oncology placed a feeding tube and it was done on 2024.    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    She saw Dr. Guthrie today and is scheduled to see CT surgery at [facility] a week from today. He is going to order MSI testing on the pathology specimen and put in a stat PET/CT to complete staging.     She has not had radiation therapy before and does not have a cardiac implanted device.     Past Medical History:   Diagnosis Date    History of blood transfusion             Past Surgical History:

## 2024-01-25 NOTE — PLAN OF CARE
Radiation education and handouts given. Side effects and management reviewed with pt. All questions answered and pt voices understanding .     Pt scheduled for CT/SIM for radiation planning on 1/31/2024 at 9:00 AM. Pt advised to be NPO x 3 hours prior, including tube feeding. Pt voices understanding.

## 2024-01-26 ENCOUNTER — CLINICAL DOCUMENTATION (OUTPATIENT)
Dept: ONCOLOGY | Age: 59
End: 2024-01-26

## 2024-01-26 ENCOUNTER — TELEPHONE (OUTPATIENT)
Dept: ONCOLOGY | Age: 59
End: 2024-01-26

## 2024-01-26 ENCOUNTER — CLINICAL DOCUMENTATION (OUTPATIENT)
Facility: HOSPITAL | Age: 59
End: 2024-01-26

## 2024-01-26 NOTE — PROGRESS NOTES
Patient Name:  Denisa Basurto  Patient :  1965  Patient MRN:  [id number]     Primary Oncologist: Anthony Guthrie MD  Referring Provider: No primary care provider on file.     Date of Service: 2024      Reason for Consult: To evaluate the patient with esophageal adenocarcinoma and left upper lobe well-differentiated neuroendocrine tumor.      Chief Complaint:    Chief Complaint   Patient presents with    New Patient     Patient Active Problem List:     Closed fracture of left distal radius and ulna     Esophageal adenocarcinoma (HCC)    HPI:   Denisa Basurto is a 58 y.o. female with no pertinent medical history referred to me on 24 for evaluation of esophageal adenocarcinoma.     She initially presented to ED with nausea vomiting and poor oral intake.  She is having stomach issues since 10, 2023. She has been unable to keep solids down and has about 40 pound weight loss.     CT abdomen and pelvis done on 2023 showed a heterogeneous solid mass in the gastric cardia extending to the gastroesophageal junction measuring about 6.6 x 6.8 x 7.7 cm.  CT chest done 2023 showed a 2.4 cm noncalcified pulmonary nodule within the left upper lobe as well as an additional 1.6 cm nodule in the left upper lobe.     Dr. Martinez attempted an EGD on 2023 and found obstructing distal esophageal mass, which is ulcerated and necrotic. He couldn't able to pass a pediatric scope beyond the mass.  Biopsy was done and pathology revealed moderately differentiated adenocarcinoma. Surgical oncology placed a feeding tube on 2024.     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     She is scheduled to see CT surgeon, Dr. Figueroa at [facility] on 24. She was referred to me for further management.     I requested MSI testing, PD-L1 and Her2 study from esophageal adenocarcinoma specimen today

## 2024-01-26 NOTE — PROGRESS NOTES
Called [facility] @ [phone] and spoke with Marzena to confirm that block should be sent to [facility]. She advised that specimen would need sent out for further testing since Dr. Guthrie does not have privileges at [facility]. Called [facility] to update and faxed orders for MSI, PD-L1, and HER2. This RN will continue to follow up.

## 2024-01-26 NOTE — PROGRESS NOTES
Evaluated the patient for patient assistance for her chemotherapy treatments and found that assistance is not required at this time due to the patient having [facility] Medicaid. I will reevaluate if there are insurance changes or authorization denials.    Thalia Lewis, CPC  Financial Navigator  [facility]  [phone]

## 2024-01-26 NOTE — TELEPHONE ENCOUNTER
Patient called given time and prep for PET scan to be done on 1/27/24 [facility] arrival at 12 PM.

## 2024-01-27 ENCOUNTER — HOSPITAL ENCOUNTER (OUTPATIENT)
Dept: PET IMAGING | Age: 59
Discharge: HOME | End: 2024-01-27
Attending: INTERNAL MEDICINE
Payer: MEDICAID

## 2024-01-27 DIAGNOSIS — C15.9 ESOPHAGEAL ADENOCARCINOMA (HCC): ICD-10-CM

## 2024-01-27 PROCEDURE — 78815 PET IMAGE W/CT SKULL-THIGH: CPT

## 2024-01-27 PROCEDURE — A9609 HC RX DIAGNOSTIC RADIOPHARMACEUTICAL: HCPCS | Performed by: INTERNAL MEDICINE

## 2024-01-27 PROCEDURE — 2580000003 HC RX 258: Performed by: INTERNAL MEDICINE

## 2024-01-27 PROCEDURE — 3430000000 HC RX DIAGNOSTIC RADIOPHARMACEUTICAL: Performed by: INTERNAL MEDICINE

## 2024-01-27 RX ORDER — SODIUM CHLORIDE 0.9 % (FLUSH) 0.9 %
10 SYRINGE (ML) INJECTION PRN
Status: COMPLETED | OUTPATIENT
Start: 2024-01-27 | End: 2024-01-27

## 2024-01-27 RX ORDER — FLUDEOXYGLUCOSE F 18 200 MCI/ML
16.74 INJECTION, SOLUTION INTRAVENOUS
Status: COMPLETED | OUTPATIENT
Start: 2024-01-27 | End: 2024-01-27

## 2024-01-27 RX ADMIN — SODIUM CHLORIDE, PRESERVATIVE FREE 10 ML: 5 INJECTION INTRAVENOUS at 12:28

## 2024-01-27 RX ADMIN — FLUDEOXYGLUCOSE F 18 16.74 MILLICURIE: 200 INJECTION, SOLUTION INTRAVENOUS at 12:28

## 2024-01-29 ENCOUNTER — CLINICAL DOCUMENTATION (OUTPATIENT)
Dept: ONCOLOGY | Age: 59
End: 2024-01-29

## 2024-01-29 NOTE — PROGRESS NOTES
Reviewed Hereditary Cancer Genetic screening form, with information provided she does not meet criteria nor indicate a desire for testing.

## 2024-01-30 ENCOUNTER — CLINICAL DOCUMENTATION (OUTPATIENT)
Dept: ONCOLOGY | Age: 59
End: 2024-01-30

## 2024-01-30 NOTE — PROGRESS NOTES
Received correspondence from [facility] confirming that they received block from [facility]. Awaiting results.

## 2024-01-31 ENCOUNTER — HOSPITAL ENCOUNTER (OUTPATIENT)
Dept: RADIATION ONCOLOGY | Age: 59
Discharge: HOME | End: 2024-01-31
Payer: MEDICAID

## 2024-01-31 PROCEDURE — 77332 RADIATION TREATMENT AID(S): CPT | Performed by: RADIOLOGY

## 2024-01-31 PROCEDURE — 77334 RADIATION TREATMENT AID(S): CPT | Performed by: RADIOLOGY

## 2024-01-31 PROCEDURE — 77470 SPECIAL RADIATION TREATMENT: CPT | Performed by: RADIOLOGY

## 2024-02-01 ENCOUNTER — CLINICAL DOCUMENTATION (OUTPATIENT)
Dept: ONCOLOGY | Age: 59
End: 2024-02-01

## 2024-02-01 DIAGNOSIS — Z79.899 ENCOUNTER FOR LONG-TERM (CURRENT) USE OF MEDICATIONS: Primary | ICD-10-CM

## 2024-02-01 NOTE — PROGRESS NOTES
Spoke with [facility] and informed that additional testing Initiated yesterday 01/31/2024. Advised that results should be back early next week. Pathology will keep this office updated. Awaiting results. If MSI is unstable, physician will change chemo to immunotherapy.

## 2024-02-02 ENCOUNTER — CLINICAL DOCUMENTATION (OUTPATIENT)
Dept: ONCOLOGY | Age: 59
End: 2024-02-02

## 2024-02-02 ENCOUNTER — TELEPHONE (OUTPATIENT)
Dept: INFUSION THERAPY | Age: 59
End: 2024-02-02

## 2024-02-02 NOTE — TELEPHONE ENCOUNTER
Called patient regarding chemo ed and start date of tx, patient is scheduled for chemo ed on 2/5 @ 9AM and tx on 2/6 @ 1:30PM, advised them that 1 visitor allowed at time of education and day of their treatments, patient states understanding

## 2024-02-02 NOTE — PROGRESS NOTES
Patient has MSI - high/unstable GE junction adenocarcinoma which is the reason for Keytruda. Authorization has been approved. Patient will be contacted with appointment times once education/tx scheduled.

## 2024-02-05 ENCOUNTER — HOSPITAL ENCOUNTER (OUTPATIENT)
Dept: INFUSION THERAPY | Age: 59
Discharge: HOME | End: 2024-02-05
Payer: MEDICAID

## 2024-02-05 ENCOUNTER — NURSE ONLY (OUTPATIENT)
Dept: ONCOLOGY | Age: 59
End: 2024-02-05
Payer: MEDICAID

## 2024-02-05 VITALS
SYSTOLIC BLOOD PRESSURE: 100 MMHG | WEIGHT: 157.4 LBS | BODY MASS INDEX: 23.86 KG/M2 | HEART RATE: 123 BPM | OXYGEN SATURATION: 97 % | DIASTOLIC BLOOD PRESSURE: 70 MMHG | RESPIRATION RATE: 18 BRPM | HEIGHT: 68 IN | TEMPERATURE: 97.8 F

## 2024-02-05 DIAGNOSIS — Z11.59 SCREENING FOR VIRAL DISEASE: ICD-10-CM

## 2024-02-05 DIAGNOSIS — T45.1X5A CHEMOTHERAPY INDUCED NAUSEA AND VOMITING: ICD-10-CM

## 2024-02-05 DIAGNOSIS — C15.9 ESOPHAGEAL ADENOCARCINOMA (HCC): Primary | ICD-10-CM

## 2024-02-05 DIAGNOSIS — T45.1X5A CHEMOTHERAPY INDUCED NAUSEA AND VOMITING: Primary | ICD-10-CM

## 2024-02-05 DIAGNOSIS — Z79.899 ENCOUNTER FOR LONG-TERM (CURRENT) USE OF MEDICATIONS: ICD-10-CM

## 2024-02-05 DIAGNOSIS — R11.2 CHEMOTHERAPY INDUCED NAUSEA AND VOMITING: Primary | ICD-10-CM

## 2024-02-05 DIAGNOSIS — C15.9 ESOPHAGEAL ADENOCARCINOMA (HCC): ICD-10-CM

## 2024-02-05 DIAGNOSIS — R11.2 CHEMOTHERAPY INDUCED NAUSEA AND VOMITING: ICD-10-CM

## 2024-02-05 LAB
ALBUMIN SERPL-MCNC: 3.8 GM/DL (ref 3.4–5)
ALP BLD-CCNC: 121 IU/L (ref 40–128)
ALT SERPL-CCNC: 18 U/L (ref 10–40)
ANION GAP SERPL CALCULATED.3IONS-SCNC: 14 MMOL/L (ref 7–16)
AST SERPL-CCNC: 15 IU/L (ref 15–37)
BASOPHILS ABSOLUTE: 0 K/CU MM
BASOPHILS RELATIVE PERCENT: 0.3 % (ref 0–1)
BILIRUB SERPL-MCNC: 0.2 MG/DL (ref 0–1)
BUN SERPL-MCNC: 25 MG/DL (ref 6–23)
CALCIUM SERPL-MCNC: 9.2 MG/DL (ref 8.3–10.6)
CHLORIDE BLD-SCNC: 100 MMOL/L (ref 99–110)
CO2: 26 MMOL/L (ref 21–32)
CORTISOL, PLASMA: 22.39
CREAT SERPL-MCNC: 0.7 MG/DL (ref 0.6–1.1)
DIFFERENTIAL TYPE: ABNORMAL
EOSINOPHILS ABSOLUTE: 0.2 K/CU MM
EOSINOPHILS RELATIVE PERCENT: 2.8 % (ref 0–3)
GFR SERPL CREATININE-BSD FRML MDRD: >60 ML/MIN/1.73M2
GLUCOSE SERPL-MCNC: 109 MG/DL (ref 70–99)
HBV SURFACE AB SERPL IA-ACNC: <3.5 M[IU]/ML
HBV SURFACE AG SERPL QL IA: NON REACTIVE
HCT VFR BLD CALC: 39.7 % (ref 37–47)
HEMOGLOBIN: 12.8 GM/DL (ref 12.5–16)
LYMPHOCYTES ABSOLUTE: 1.7 K/CU MM
LYMPHOCYTES RELATIVE PERCENT: 23.9 % (ref 24–44)
MCH RBC QN AUTO: 30.8 PG (ref 27–31)
MCHC RBC AUTO-ENTMCNC: 32.2 % (ref 32–36)
MCV RBC AUTO: 95.4 FL (ref 78–100)
MONOCYTES ABSOLUTE: 0.4 K/CU MM
MONOCYTES RELATIVE PERCENT: 6.2 % (ref 0–4)
PDW BLD-RTO: 15.2 % (ref 11.7–14.9)
PLATELET # BLD: 357 K/CU MM (ref 140–440)
PMV BLD AUTO: 10.7 FL (ref 7.5–11.1)
POTASSIUM SERPL-SCNC: 4.3 MMOL/L (ref 3.5–5.1)
RBC # BLD: 4.16 M/CU MM (ref 4.2–5.4)
SEGMENTED NEUTROPHILS ABSOLUTE COUNT: 4.8 K/CU MM
SEGMENTED NEUTROPHILS RELATIVE PERCENT: 66.8 % (ref 36–66)
SODIUM BLD-SCNC: 140 MMOL/L (ref 135–145)
TOTAL PROTEIN: 6.7 GM/DL (ref 6.4–8.2)
TSH SERPL DL<=0.005 MIU/L-ACNC: 1.85 UIU/ML (ref 0.27–4.2)
WBC # BLD: 7.1 K/CU MM (ref 4–10.5)

## 2024-02-05 PROCEDURE — 99213 OFFICE O/P EST LOW 20 MIN: CPT

## 2024-02-05 PROCEDURE — 99211 OFF/OP EST MAY X REQ PHY/QHP: CPT | Performed by: INTERNAL MEDICINE

## 2024-02-05 PROCEDURE — 80053 COMPREHEN METABOLIC PANEL: CPT

## 2024-02-05 PROCEDURE — 84443 ASSAY THYROID STIM HORMONE: CPT

## 2024-02-05 PROCEDURE — 85025 COMPLETE CBC W/AUTO DIFF WBC: CPT

## 2024-02-05 PROCEDURE — 86706 HEP B SURFACE ANTIBODY: CPT

## 2024-02-05 PROCEDURE — 87340 HEPATITIS B SURFACE AG IA: CPT

## 2024-02-05 PROCEDURE — 36415 COLL VENOUS BLD VENIPUNCTURE: CPT

## 2024-02-05 PROCEDURE — 82533 TOTAL CORTISOL: CPT

## 2024-02-05 PROCEDURE — 86704 HEP B CORE ANTIBODY TOTAL: CPT

## 2024-02-05 RX ORDER — MORPHINE SULFATE 100 MG/5ML
5 SOLUTION, CONCENTRATE ORAL EVERY 6 HOURS PRN
Qty: 15 ML | Refills: 0 | Status: SHIPPED | OUTPATIENT
Start: 2024-02-05 | End: 2024-02-20

## 2024-02-05 RX ORDER — ONDANSETRON 8 MG/1
8 TABLET, ORALLY DISINTEGRATING ORAL EVERY 8 HOURS PRN
Qty: 90 TABLET | Refills: 4 | Status: SHIPPED | OUTPATIENT
Start: 2024-02-05

## 2024-02-05 RX ORDER — ONDANSETRON HYDROCHLORIDE 8 MG/1
8 TABLET, FILM COATED ORAL EVERY 8 HOURS PRN
Qty: 90 TABLET | Refills: 5 | Status: SHIPPED | OUTPATIENT
Start: 2024-02-05

## 2024-02-05 NOTE — PROGRESS NOTES
Patient arrived with significant other for treatment planning and chemotherapy education.  Discussed treatment plan, potential side effects, prevention, and symptom management.  Reviewed chemotherapy education folder and drug monograph.  Patient's regimen will be Keytruda every 21 days.    Patient signed chemotherapy consent for Keytruda. Copy of consent given to patient.    Reviewed chemotherapy schedule/calendar.     Rx for Zofran, Morphine 5mg solution, Omeprazole 40 mg solution and Dexamethasone sent to pharmacy per physician order.  Patient given calendar and written instructions for these medications and how/when to take.      Patient's premeds as follows:  Zofran 8 mg one tab every 8 hours PRN.    Distress thermometer given to patient to fill out - this RN reviewed with patient.  Referrals placed for dietician and Dr Garcia. Copy given to Psychosocial Coordinator.    Patient given on-call phone number for after office hours and weekends.    CBC, CMP and Hep B panels drawn today prior to first treatment.    Confirmed first chemotherapy appointment for Keytruda on 02/06/2024 at 1330.

## 2024-02-06 ENCOUNTER — HOSPITAL ENCOUNTER (OUTPATIENT)
Dept: INFUSION THERAPY | Age: 59
Discharge: HOME | End: 2024-02-06
Payer: MEDICAID

## 2024-02-06 ENCOUNTER — TELEPHONE (OUTPATIENT)
Dept: SURGERY | Age: 59
End: 2024-02-06

## 2024-02-06 ENCOUNTER — CLINICAL DOCUMENTATION (OUTPATIENT)
Dept: ONCOLOGY | Age: 59
End: 2024-02-06

## 2024-02-06 VITALS
DIASTOLIC BLOOD PRESSURE: 65 MMHG | WEIGHT: 158 LBS | HEIGHT: 68 IN | HEART RATE: 84 BPM | BODY MASS INDEX: 23.95 KG/M2 | SYSTOLIC BLOOD PRESSURE: 113 MMHG | TEMPERATURE: 98.1 F | OXYGEN SATURATION: 95 %

## 2024-02-06 DIAGNOSIS — Z11.59 SCREENING FOR VIRAL DISEASE: ICD-10-CM

## 2024-02-06 DIAGNOSIS — Z79.899 ENCOUNTER FOR LONG-TERM (CURRENT) USE OF MEDICATIONS: Primary | ICD-10-CM

## 2024-02-06 DIAGNOSIS — C15.9 ESOPHAGEAL ADENOCARCINOMA (HCC): ICD-10-CM

## 2024-02-06 DIAGNOSIS — C15.9 ESOPHAGEAL ADENOCARCINOMA (HCC): Primary | ICD-10-CM

## 2024-02-06 DIAGNOSIS — Z79.899 ENCOUNTER FOR LONG-TERM (CURRENT) USE OF MEDICATIONS: ICD-10-CM

## 2024-02-06 PROCEDURE — 2580000003 HC RX 258: Performed by: INTERNAL MEDICINE

## 2024-02-06 PROCEDURE — 6360000002 HC RX W HCPCS: Performed by: INTERNAL MEDICINE

## 2024-02-06 PROCEDURE — 96413 CHEMO IV INFUSION 1 HR: CPT

## 2024-02-06 RX ORDER — SODIUM CHLORIDE 9 MG/ML
5-250 INJECTION, SOLUTION INTRAVENOUS PRN
Status: DISCONTINUED | OUTPATIENT
Start: 2024-02-06 | End: 2024-02-07 | Stop reason: HOSPADM

## 2024-02-06 RX ORDER — SODIUM CHLORIDE 9 MG/ML
5-250 INJECTION, SOLUTION INTRAVENOUS PRN
Status: CANCELLED | OUTPATIENT
Start: 2024-02-08

## 2024-02-06 RX ORDER — MEPERIDINE HYDROCHLORIDE 50 MG/ML
12.5 INJECTION INTRAMUSCULAR; INTRAVENOUS; SUBCUTANEOUS PRN
OUTPATIENT
Start: 2024-02-29

## 2024-02-06 RX ORDER — FAMOTIDINE 10 MG/ML
20 INJECTION, SOLUTION INTRAVENOUS
OUTPATIENT
Start: 2024-02-29

## 2024-02-06 RX ORDER — ONDANSETRON 2 MG/ML
8 INJECTION INTRAMUSCULAR; INTRAVENOUS
Status: CANCELLED | OUTPATIENT
Start: 2024-02-08

## 2024-02-06 RX ORDER — SODIUM CHLORIDE 0.9 % (FLUSH) 0.9 %
5-40 SYRINGE (ML) INJECTION PRN
Status: CANCELLED | OUTPATIENT
Start: 2024-02-08

## 2024-02-06 RX ORDER — ALBUTEROL SULFATE 90 UG/1
4 AEROSOL, METERED RESPIRATORY (INHALATION) PRN
Status: CANCELLED | OUTPATIENT
Start: 2024-02-08

## 2024-02-06 RX ORDER — SODIUM CHLORIDE 9 MG/ML
5-250 INJECTION, SOLUTION INTRAVENOUS PRN
OUTPATIENT
Start: 2024-02-29

## 2024-02-06 RX ORDER — SODIUM CHLORIDE 9 MG/ML
INJECTION, SOLUTION INTRAVENOUS CONTINUOUS
Status: CANCELLED | OUTPATIENT
Start: 2024-02-08

## 2024-02-06 RX ORDER — EPINEPHRINE 1 MG/ML
0.3 INJECTION, SOLUTION, CONCENTRATE INTRAVENOUS PRN
OUTPATIENT
Start: 2024-02-29

## 2024-02-06 RX ORDER — MEPERIDINE HYDROCHLORIDE 50 MG/ML
12.5 INJECTION INTRAMUSCULAR; INTRAVENOUS; SUBCUTANEOUS PRN
Status: CANCELLED | OUTPATIENT
Start: 2024-02-08

## 2024-02-06 RX ORDER — ONDANSETRON 2 MG/ML
8 INJECTION INTRAMUSCULAR; INTRAVENOUS
OUTPATIENT
Start: 2024-02-29

## 2024-02-06 RX ORDER — SODIUM CHLORIDE 9 MG/ML
INJECTION, SOLUTION INTRAVENOUS CONTINUOUS
OUTPATIENT
Start: 2024-02-29

## 2024-02-06 RX ORDER — ALBUTEROL SULFATE 90 UG/1
4 AEROSOL, METERED RESPIRATORY (INHALATION) PRN
OUTPATIENT
Start: 2024-02-29

## 2024-02-06 RX ORDER — HEPARIN SODIUM (PORCINE) LOCK FLUSH IV SOLN 100 UNIT/ML 100 UNIT/ML
500 SOLUTION INTRAVENOUS PRN
Status: CANCELLED | OUTPATIENT
Start: 2024-02-08

## 2024-02-06 RX ORDER — DIPHENHYDRAMINE HYDROCHLORIDE 50 MG/ML
50 INJECTION INTRAMUSCULAR; INTRAVENOUS
Status: CANCELLED | OUTPATIENT
Start: 2024-02-08

## 2024-02-06 RX ORDER — SODIUM CHLORIDE 0.9 % (FLUSH) 0.9 %
5-40 SYRINGE (ML) INJECTION PRN
OUTPATIENT
Start: 2024-02-29

## 2024-02-06 RX ORDER — FAMOTIDINE 10 MG/ML
20 INJECTION, SOLUTION INTRAVENOUS
Status: CANCELLED | OUTPATIENT
Start: 2024-02-08

## 2024-02-06 RX ORDER — ACETAMINOPHEN 325 MG/1
650 TABLET ORAL
OUTPATIENT
Start: 2024-02-29

## 2024-02-06 RX ORDER — DIPHENHYDRAMINE HYDROCHLORIDE 50 MG/ML
50 INJECTION INTRAMUSCULAR; INTRAVENOUS
OUTPATIENT
Start: 2024-02-29

## 2024-02-06 RX ORDER — EPINEPHRINE 1 MG/ML
0.3 INJECTION, SOLUTION, CONCENTRATE INTRAVENOUS PRN
Status: CANCELLED | OUTPATIENT
Start: 2024-02-08

## 2024-02-06 RX ORDER — HEPARIN SODIUM (PORCINE) LOCK FLUSH IV SOLN 100 UNIT/ML 100 UNIT/ML
500 SOLUTION INTRAVENOUS PRN
OUTPATIENT
Start: 2024-02-29

## 2024-02-06 RX ORDER — ACETAMINOPHEN 325 MG/1
650 TABLET ORAL
Status: CANCELLED | OUTPATIENT
Start: 2024-02-08

## 2024-02-06 RX ADMIN — SODIUM CHLORIDE 20 ML/HR: 9 INJECTION, SOLUTION INTRAVENOUS at 14:51

## 2024-02-06 RX ADMIN — SODIUM CHLORIDE 200 MG: 9 INJECTION, SOLUTION INTRAVENOUS at 14:51

## 2024-02-06 ASSESSMENT — PAIN SCALES - GENERAL: PAINLEVEL_OUTOF10: 4

## 2024-02-06 ASSESSMENT — PAIN DESCRIPTION - LOCATION: LOCATION: ABDOMEN

## 2024-02-06 ASSESSMENT — PAIN DESCRIPTION - ORIENTATION: ORIENTATION: RIGHT

## 2024-02-06 NOTE — TELEPHONE ENCOUNTER
Left message on patient's alternative contact number-(her number is not in service)-to schedule appt-feeding tube evaluation-referral Dr Guthrie

## 2024-02-07 LAB — HBV CORE AB SERPL QL IA: NEGATIVE

## 2024-02-11 ENCOUNTER — HOSPITAL ENCOUNTER (EMERGENCY)
Age: 59
Discharge: ANOTHER HOSPITAL | End: 2024-02-11
Attending: EMERGENCY MEDICINE
Payer: MEDICAID

## 2024-02-11 VITALS
OXYGEN SATURATION: 91 % | BODY MASS INDEX: 23.49 KG/M2 | HEART RATE: 76 BPM | TEMPERATURE: 98.8 F | WEIGHT: 155 LBS | HEIGHT: 68 IN | DIASTOLIC BLOOD PRESSURE: 82 MMHG | SYSTOLIC BLOOD PRESSURE: 142 MMHG | RESPIRATION RATE: 18 BRPM

## 2024-02-11 DIAGNOSIS — Z43.1 ATTENTION TO G-TUBE (HCC): ICD-10-CM

## 2024-02-11 DIAGNOSIS — T85.528A DISLODGED GASTROSTOMY TUBE: Primary | ICD-10-CM

## 2024-02-11 PROCEDURE — 99285 EMERGENCY DEPT VISIT HI MDM: CPT

## 2024-02-11 NOTE — ED PROVIDER NOTES
Emergency Department Encounter    Patient: Denisa Basurto  MRN: [id number]  : 1965  Date of Evaluation: 2024  ED Provider:  Asa Liu MD    Triage Chief Complaint:   G Tube Complications    [place]:  Denisa Basurto is a 58 y.o. female of esophageal tumor as well as a G-tube placement in early January in progress this patient states she can swallow liquid but not solid foods) that presents to the emergency department complaints of all fluid leaking out around the tube when she tries to feed herself through the tube.  She has no complaints of pain, nausea or vomiting.  No fever or abdominal pain reported.    ROS - see HPI, below listed is current ROS at time of my eval:  General:  No fevers, no chills, no weakness  Cardiovascular:  No chest pain, no palpitations  Respiratory:  No shortness of breath, no cough, no wheezing  Gastrointestinal: Quit leaking around feeding tube.  No pain, no nausea, no vomiting, no diarrhea  Musculoskeletal:  No muscle pain, no joint pain  Skin:  No rash, no pruritis, no easy bruising  Neurologic:  No speech problems, no headache, no extremity numbness, no extremity tingling, no extremity weakness      Past Medical History:   Diagnosis Date    Esophageal adenocarcinoma (HCC) 2024    History of blood transfusion          Past Surgical History:   Procedure Laterality Date    ASD REPAIR       repaired hole in heart    TONSILLECTOMY      age 8    WRIST FRACTURE SURGERY Left 2021    LEFT WRIST OPEN REDUCTION INTERNAL FIXATION performed by Guido Walsh DO at [facility] OR     Family History   Problem Relation Age of Onset    Stroke Mother     High Blood Pressure Mother     Cancer Father         Lung CA    Other Brother         Stomach issues     Social History     Socioeconomic History    Marital status:      Spouse name: Not on file    Number of children: Not on file    Years of education: Not on file    Highest education level: Not on file   Occupational

## 2024-02-12 NOTE — ED PROVIDER NOTES
Transfer of Care Note:   Physician Signing out: Dr Liu  Receiving Physician: Franc Garcia MD        Brief history:  58-year-old female who had a recent G-tube placement at [facility] by IR.  Came in for G-tube complication.  Apparently the G-tube was obstructed.  Was removed and unable to have G-tube replaced.  Do not have IR available here.  Pending transfer to [facility].    Items pending that need to be checked:  Accepting physician for transfer      Additional Assessment and results:   I have personally performed a face to face diagnostic evaluation on this patient. The patient's initial evaluation and plan have been discussed with the prior physician who initially evaluated the patient. Nursing Notes, Past Medical Hx, Past Surgical Hx, Social Hx, Allergies, vital signs and Family Hx were all reviewed.    Vitals:    02/11/24 2100   BP: 113/72   Pulse: 97   Resp: 18   Temp:    SpO2: 92%           Labs Reviewed - No data to display  Medications - No data to display  No orders to display     ED Course as of 02/11/24 2138   Sun Feb 11, 2024 2053 Spoke with [facility]   [CR]   2112 Spoke with [facility] hospitalist, Dr. Pike, accepted patient [CR]      ED Course User Index  [CR] Franc Garcia MD       Further MDM and disposition:   Assessment:   G-tube dislodged  Plan:   Spoke with surgery team at [facility], felt that there would be need to be placed by IR.  Recommended talking to the hospitalist.  Spoke with hospitalist who accepted the patient for transfer as we do not have IR available.      PROCEDURES: (None if blank)  Procedures:     CRITICAL CARE: (None if blank)        Independent Imaging Interpretation by me: please seen ED course/above/below  EKG (if obtained): (All EKG's are interpreted by myself in the absence of a cardiologist) Please see ED course/above/below    ED COURSE:  ED Course as of 02/11/24 2138   Sun Feb 11, 2024 2053 Spoke with [facility]   [CR]   2112 Spoke with [facility]

## 2024-02-14 ENCOUNTER — HOSPITAL ENCOUNTER (OUTPATIENT)
Dept: INFUSION THERAPY | Age: 59
Discharge: HOME | End: 2024-02-14
Payer: MEDICAID

## 2024-02-14 ENCOUNTER — OFFICE VISIT (OUTPATIENT)
Dept: ONCOLOGY | Age: 59
End: 2024-02-14
Payer: MEDICAID

## 2024-02-14 ENCOUNTER — TELEPHONE (OUTPATIENT)
Dept: ONCOLOGY | Age: 59
End: 2024-02-14

## 2024-02-14 ENCOUNTER — INITIAL CONSULT (OUTPATIENT)
Dept: ONCOLOGY | Age: 59
End: 2024-02-14
Payer: MEDICAID

## 2024-02-14 VITALS
WEIGHT: 155.2 LBS | DIASTOLIC BLOOD PRESSURE: 71 MMHG | OXYGEN SATURATION: 95 % | SYSTOLIC BLOOD PRESSURE: 116 MMHG | BODY MASS INDEX: 23.52 KG/M2 | HEART RATE: 108 BPM | TEMPERATURE: 97.6 F | HEIGHT: 68 IN | RESPIRATION RATE: 16 BRPM

## 2024-02-14 VITALS
HEIGHT: 68 IN | TEMPERATURE: 97.6 F | OXYGEN SATURATION: 95 % | BODY MASS INDEX: 23.49 KG/M2 | WEIGHT: 155 LBS | HEART RATE: 108 BPM | DIASTOLIC BLOOD PRESSURE: 71 MMHG | SYSTOLIC BLOOD PRESSURE: 161 MMHG

## 2024-02-14 DIAGNOSIS — C15.9 ESOPHAGEAL ADENOCARCINOMA (HCC): ICD-10-CM

## 2024-02-14 DIAGNOSIS — C15.9 ESOPHAGEAL ADENOCARCINOMA (HCC): Primary | ICD-10-CM

## 2024-02-14 PROCEDURE — 3017F COLORECTAL CA SCREEN DOC REV: CPT | Performed by: INTERNAL MEDICINE

## 2024-02-14 PROCEDURE — 4004F PT TOBACCO SCREEN RCVD TLK: CPT | Performed by: INTERNAL MEDICINE

## 2024-02-14 PROCEDURE — G8484 FLU IMMUNIZE NO ADMIN: HCPCS | Performed by: INTERNAL MEDICINE

## 2024-02-14 PROCEDURE — G8427 DOCREV CUR MEDS BY ELIG CLIN: HCPCS | Performed by: INTERNAL MEDICINE

## 2024-02-14 PROCEDURE — G8420 CALC BMI NORM PARAMETERS: HCPCS | Performed by: INTERNAL MEDICINE

## 2024-02-14 PROCEDURE — 99402 PREV MED CNSL INDIV APPRX 30: CPT | Performed by: INTERNAL MEDICINE

## 2024-02-14 PROCEDURE — 99214 OFFICE O/P EST MOD 30 MIN: CPT | Performed by: INTERNAL MEDICINE

## 2024-02-14 PROCEDURE — 99211 OFF/OP EST MAY X REQ PHY/QHP: CPT

## 2024-02-14 RX ORDER — OXYCODONE HCL 20 MG/ML
5 CONCENTRATE, ORAL ORAL EVERY 6 HOURS PRN
Qty: 15 ML | Refills: 0 | Status: SHIPPED | OUTPATIENT
Start: 2024-02-14 | End: 2024-02-14 | Stop reason: SDUPTHER

## 2024-02-14 RX ORDER — OXYCODONE HCL 20 MG/ML
5 CONCENTRATE, ORAL ORAL EVERY 6 HOURS PRN
Qty: 30 ML | Refills: 0 | Status: SHIPPED | OUTPATIENT
Start: 2024-02-14 | End: 2024-03-15

## 2024-02-14 RX ORDER — OXYCODONE HCL 20 MG/ML
10 CONCENTRATE, ORAL ORAL EVERY 4 HOURS PRN
COMMUNITY
End: 2024-02-14 | Stop reason: SDUPTHER

## 2024-02-14 RX ORDER — OXYCODONE HCL 5 MG/5 ML
5 SOLUTION, ORAL ORAL EVERY 6 HOURS PRN
COMMUNITY
Start: 2024-02-12 | End: 2024-02-17

## 2024-02-14 NOTE — PROGRESS NOTES
Palliative Care Initial Assessment    Medical Oncology History:    Late December, 2023 was evaluated for was symptoms of significant weight loss, abdominal discomfort, vomiting and the difficulty swallowing.  The symptoms had been going on for few months and she had lost more than 40 pounds.  CT December 20, 2023 heterogeneous solid mass in the gastric cardia and gastroesophageal junction 6.8 x 7.7 cm.  CT chest 2.4 cm noncalcified pulmonary nodule left upper lobe.  EGD December 29, 2023 obstructing distal esophageal mass ulcerated and necrotic, pediatric scope could not be passed through.  Biopsy moderately differentiated adenocarcinoma.  January 2, 2024 left upper lobe lung biopsy pathology well-differentiated neuroendocrine tumor consistent with a typical carcinoid.  A PET scan January 25, 2024 hypermetabolic distal esophageal mass extending to the gastric cardia and fundus.  2 hypermetabolic left upper lobe pulmonary nodules.  February 6, 2024 started on neoadjuvant therapy with Keytruda  Other Medical Problems:   In general she has been fairly healthy     Personal History     A. Life Time:   She was  in early 2000, had 3 grown children currently ages 31, 33 and 35.  Has 10 grandchildren.  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  Currently she has a very nice boyfriend they have been together for more than 13 years.  She has long history of smoking, still does.  No history of excessive ethanol intake     B. Family:   As above    Physical Symptoms:   February 14, 2024 stated that after the first injection with Keytruda she has felt better.  She noticed that she has been able to swallow some semisolid foods.  She was still having some pain in the abdominal area where she had G-tube.  Oxycodone was helpful.  She was doing continuous tube feeding and also was now starting to eat some semisolid orally.  Is trying to maintain her nutritional intake at a decent level.

## 2024-02-14 NOTE — PROGRESS NOTES
Patient Name:  Denisa Basurto  Patient :  1965  Patient MRN:  [id number]     Primary Oncologist: Anthony Guthrie MD  Referring Provider: No primary care provider on file.     Date of Service: 2024      Reason for Consult: To evaluate the patient with esophageal adenocarcinoma and left upper lobe well-differentiated neuroendocrine tumor.      Chief Complaint:    Chief Complaint   Patient presents with    Follow-up     Patient Active Problem List:     Closed fracture of left distal radius and ulna     Esophageal adenocarcinoma (HCC)    HPI:   Denisa Basurto is a 58 y.o. female with no pertinent medical history referred to me on 24 for evaluation of esophageal adenocarcinoma.     She initially presented to ED with nausea vomiting and poor oral intake.  She is having stomach issues since 10, 2023. She has been unable to keep solids down and has about 40 pound weight loss.     CT abdomen and pelvis done on 2023 showed a heterogeneous solid mass in the gastric cardia extending to the gastroesophageal junction measuring about 6.6 x 6.8 x 7.7 cm.  CT chest done 2023 showed a 2.4 cm noncalcified pulmonary nodule within the left upper lobe as well as an additional 1.6 cm nodule in the left upper lobe.     Dr. Martinez attempted an EGD on 2023 and found obstructing distal esophageal mass, which is ulcerated and necrotic. He couldn't able to pass a pediatric scope beyond the mass.  Biopsy was done and pathology revealed moderately differentiated adenocarcinoma. Surgical oncology placed a feeding tube on 2024.     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     She was seen by CT surgeon, Dr. Figueroa at [facility] on 24. He scheduled for CT guided biopsy of second lung nodule and it is scheduled to be done on 24.     I requested MSI which came back unstable. PD-L1 for

## 2024-02-14 NOTE — PROGRESS NOTES
MA Rooming Questions  Patient: Denisa Basurto  MRN: [id number]    Date: 2/14/2024        1. Do you have any new issues?   yes - pt had feeding tube replaced 2 days ago         2. Do you need any refills on medications?    no    3. Have you had any imaging done since your last visit?   yes - pet 1/27    4. Have you been hospitalized or seen in the emergency room since your last visit here?   yes - [facility]    5. Did the patient have a depression screening completed today? No    No data recorded     PHQ-9 Given to (if applicable):               PHQ-9 Score (if applicable):                     [] Positive     []  Negative              Does question #9 need addressed (if applicable)                     [] Yes    []  No               Nat Parekh MA

## 2024-02-14 NOTE — PROGRESS NOTES
MA Rooming Questions  Patient: Denisa Basurto  MRN: [id number]    Date: 2/14/2024        1. Do you have any new issues?   yes - was curious about medical mariajuana and pain prevention.          2. Do you need any refills on medications?    no    3. Have you had any imaging done since your last visit?   Yes- G-Tube placement.    4. Have you been hospitalized or seen in the emergency room since your last visit here?   Yes- 2/12- [facility]. Transferred to [facility]     5. Did the patient have a depression screening completed today? No    No data recorded     PHQ-9 Given to (if applicable):               PHQ-9 Score (if applicable):                     [] Positive     []  Negative              Does question #9 need addressed (if applicable)                     [] Yes    []  No               Carmen Mccartney MA

## 2024-02-14 NOTE — TELEPHONE ENCOUNTER
Patient called to given biopsy time and prep, patient states this procedure will be done next week at [facility]. IR called and biopsy canceled.

## 2024-02-14 NOTE — TELEPHONE ENCOUNTER
Deepika at [facility] said that the oxycodone called in today at 15ml only comes in 30mls and asks that the doctor would send in a new script for that quantity.

## 2024-02-15 ENCOUNTER — HOSPITAL ENCOUNTER (OUTPATIENT)
Dept: DIABETES SERVICES | Age: 59
Setting detail: THERAPIES SERIES
Discharge: HOME | End: 2024-02-15
Payer: MEDICAID

## 2024-02-15 PROCEDURE — 97802 MEDICAL NUTRITION INDIV IN: CPT

## 2024-02-15 NOTE — PROGRESS NOTES
Outpatient Medical Nutrition Therapy    02/15/2024  12:30-13:00    Reason for referral: esophageal adenocarcinoma (C15.9)    Client History:    Pertinent medications:   Current Outpatient Medications   Medication Instructions    cetirizine (ZYRTEC) 10 mg, Oral, DAILY    Morphine Sulfate (Concentrate) 5 mg, Oral, EVERY 6 HOURS PRN    Multiple Vitamins-Minerals (THERAPEUTIC MULTIVITAMIN-MINERALS) tablet 1 tablet, Oral, DAILY    nicotine (NICODERM CQ) 21 MG/24HR 1 patch, TransDERmal, EVERY 24 HOURS    omeprazole (PRILOSEC) 40 mg, Oral, 2 times daily    omeprazole (PRILOSEC) 40 mg, Per G Tube, 2 TIMES DAILY BEFORE MEALS    ondansetron (ZOFRAN) 4 mg, Oral, EVERY 8 HOURS PRN    ondansetron (ZOFRAN) 8 mg, Oral, EVERY 8 HOURS PRN    ondansetron (ZOFRAN-ODT) 8 mg, SubLINGual, EVERY 8 HOURS PRN    oxyCODONE (ROXICODONE INTENSOL) 5 mg, Oral, EVERY 6 HOURS PRN, Pt can take this Q 6 hrs    oxyCODONE (ROXICODONE) 5 mg, Oral, EVERY 6 HOURS PRN      Social hx: Retired and lives with significant other. Currently smokes.      Pertinent Medical hx: obstructive distal mass in esophagus, lung nodules, dysphagia, PEG placed 1/2/2024 and then replaced 2/15/2024     pain   edentulous    Activity habits: less active with pain from cancer     Food/nutrition habits: Not able to tolerate much po intake, only liquids, pudding, and soups. Has not been eating much in past month but able to take bites of foods this week. May drink 8 oz gatorade, consume 1-2 ice popsicles each day. Main source of nutrition from tube feeding via PEG. Did not tolerate bolus regimen so uses pump for feeding. Some difficulty with feedings due to pain at tube site and some anxiety. Has been off feedings this week for replacement tube. Feeling better about tube and able to resume feeding with plan to increase volume. Currently on jevity 1.5 formula with plan for 60 ml/hr for 22 hours. Currently 30 ml/hr with gradual increase back to goal rate 60 ml/hr.

## 2024-02-26 ENCOUNTER — HOSPITAL ENCOUNTER (OUTPATIENT)
Dept: INFUSION THERAPY | Age: 59
Discharge: HOME | End: 2024-02-26
Payer: MEDICAID

## 2024-02-26 DIAGNOSIS — Z79.899 ENCOUNTER FOR LONG-TERM (CURRENT) USE OF MEDICATIONS: ICD-10-CM

## 2024-02-26 DIAGNOSIS — Z11.59 SCREENING FOR VIRAL DISEASE: ICD-10-CM

## 2024-02-26 DIAGNOSIS — C15.9 ESOPHAGEAL ADENOCARCINOMA (HCC): ICD-10-CM

## 2024-02-26 LAB
ALBUMIN SERPL-MCNC: 3.7 GM/DL (ref 3.4–5)
ALP BLD-CCNC: 92 IU/L (ref 40–128)
ALT SERPL-CCNC: 23 U/L (ref 10–40)
ANION GAP SERPL CALCULATED.3IONS-SCNC: 12 MMOL/L (ref 7–16)
AST SERPL-CCNC: 23 IU/L (ref 15–37)
BASOPHILS ABSOLUTE: 0 K/CU MM
BASOPHILS RELATIVE PERCENT: 0.3 % (ref 0–1)
BILIRUB SERPL-MCNC: 0.3 MG/DL (ref 0–1)
BUN SERPL-MCNC: 21 MG/DL (ref 6–23)
CALCIUM SERPL-MCNC: 9.7 MG/DL (ref 8.3–10.6)
CHLORIDE BLD-SCNC: 98 MMOL/L (ref 99–110)
CO2: 28 MMOL/L (ref 21–32)
CREAT SERPL-MCNC: 0.8 MG/DL (ref 0.6–1.1)
DIFFERENTIAL TYPE: ABNORMAL
EOSINOPHILS ABSOLUTE: 0.1 K/CU MM
EOSINOPHILS RELATIVE PERCENT: 3.4 % (ref 0–3)
GFR SERPL CREATININE-BSD FRML MDRD: >60 ML/MIN/1.73M2
GLUCOSE SERPL-MCNC: 109 MG/DL (ref 70–99)
HCT VFR BLD CALC: 38.5 % (ref 37–47)
HEMOGLOBIN: 12.4 GM/DL (ref 12.5–16)
LYMPHOCYTES ABSOLUTE: 1.1 K/CU MM
LYMPHOCYTES RELATIVE PERCENT: 29 % (ref 24–44)
MCH RBC QN AUTO: 30.7 PG (ref 27–31)
MCHC RBC AUTO-ENTMCNC: 32.2 % (ref 32–36)
MCV RBC AUTO: 95.3 FL (ref 78–100)
MONOCYTES ABSOLUTE: 0.5 K/CU MM
MONOCYTES RELATIVE PERCENT: 12.3 % (ref 0–4)
PDW BLD-RTO: 14.2 % (ref 11.7–14.9)
PLATELET # BLD: 288 K/CU MM (ref 140–440)
PMV BLD AUTO: 10.2 FL (ref 7.5–11.1)
POTASSIUM SERPL-SCNC: 4.4 MMOL/L (ref 3.5–5.1)
RBC # BLD: 4.04 M/CU MM (ref 4.2–5.4)
SEGMENTED NEUTROPHILS ABSOLUTE COUNT: 2.1 K/CU MM
SEGMENTED NEUTROPHILS RELATIVE PERCENT: 55 % (ref 36–66)
SODIUM BLD-SCNC: 138 MMOL/L (ref 135–145)
TOTAL PROTEIN: 7 GM/DL (ref 6.4–8.2)
WBC # BLD: 3.8 K/CU MM (ref 4–10.5)

## 2024-02-26 PROCEDURE — 36415 COLL VENOUS BLD VENIPUNCTURE: CPT

## 2024-02-26 PROCEDURE — 85025 COMPLETE CBC W/AUTO DIFF WBC: CPT

## 2024-02-26 PROCEDURE — 80053 COMPREHEN METABOLIC PANEL: CPT

## 2024-02-27 ENCOUNTER — HOSPITAL ENCOUNTER (OUTPATIENT)
Dept: INFUSION THERAPY | Age: 59
Discharge: HOME | End: 2024-02-27
Payer: MEDICAID

## 2024-02-27 VITALS
TEMPERATURE: 98.3 F | WEIGHT: 155.2 LBS | OXYGEN SATURATION: 95 % | DIASTOLIC BLOOD PRESSURE: 60 MMHG | SYSTOLIC BLOOD PRESSURE: 106 MMHG | HEART RATE: 75 BPM | HEIGHT: 68 IN | BODY MASS INDEX: 23.52 KG/M2

## 2024-02-27 DIAGNOSIS — Z11.59 SCREENING FOR VIRAL DISEASE: ICD-10-CM

## 2024-02-27 DIAGNOSIS — C15.9 ESOPHAGEAL ADENOCARCINOMA (HCC): ICD-10-CM

## 2024-02-27 DIAGNOSIS — Z79.899 ENCOUNTER FOR LONG-TERM (CURRENT) USE OF MEDICATIONS: Primary | ICD-10-CM

## 2024-02-27 PROCEDURE — 96413 CHEMO IV INFUSION 1 HR: CPT

## 2024-02-27 PROCEDURE — 2580000003 HC RX 258: Performed by: INTERNAL MEDICINE

## 2024-02-27 PROCEDURE — 6360000002 HC RX W HCPCS: Performed by: INTERNAL MEDICINE

## 2024-02-27 RX ORDER — SODIUM CHLORIDE 9 MG/ML
5-250 INJECTION, SOLUTION INTRAVENOUS PRN
Status: DISCONTINUED | OUTPATIENT
Start: 2024-02-27 | End: 2024-02-28 | Stop reason: HOSPADM

## 2024-02-27 RX ORDER — SODIUM CHLORIDE 0.9 % (FLUSH) 0.9 %
5-40 SYRINGE (ML) INJECTION PRN
Status: DISCONTINUED | OUTPATIENT
Start: 2024-02-27 | End: 2024-02-28 | Stop reason: HOSPADM

## 2024-02-27 RX ADMIN — SODIUM CHLORIDE 200 MG: 9 INJECTION, SOLUTION INTRAVENOUS at 10:19

## 2024-02-27 RX ADMIN — SODIUM CHLORIDE 20 ML/HR: 9 INJECTION, SOLUTION INTRAVENOUS at 10:18

## 2024-02-27 NOTE — PROGRESS NOTES
Pt arrived for Keytruda infusion.  Labs reviewed from 2/26/24, within treatment parameters.  PIV placed without difficulty.  Positive blood return noted.  Flushed and locked with normal saline.  Assessment complete, states has been having joint pain and itching to back since starting Keytruda, both of which have been managed. She denies other concerns or questions at this time.  Treatment plan approved, released and completed as ordered.  PIV removed per protocol.  Pt tolerated well.  AVS provided.  Discharge ambulatory in stable condition.  Ruthie Mulligan RN

## 2024-02-29 ENCOUNTER — HOSPITAL ENCOUNTER (OUTPATIENT)
Dept: INFUSION THERAPY | Age: 59
Discharge: HOME | End: 2024-02-29
Payer: MEDICAID

## 2024-02-29 ENCOUNTER — OFFICE VISIT (OUTPATIENT)
Dept: ONCOLOGY | Age: 59
End: 2024-02-29
Payer: MEDICAID

## 2024-02-29 VITALS
WEIGHT: 156 LBS | OXYGEN SATURATION: 100 % | DIASTOLIC BLOOD PRESSURE: 72 MMHG | TEMPERATURE: 97.5 F | SYSTOLIC BLOOD PRESSURE: 106 MMHG | BODY MASS INDEX: 23.64 KG/M2 | HEART RATE: 102 BPM | HEIGHT: 68 IN

## 2024-02-29 DIAGNOSIS — Z11.59 SCREENING FOR VIRAL DISEASE: Primary | ICD-10-CM

## 2024-02-29 DIAGNOSIS — C15.9 ESOPHAGEAL ADENOCARCINOMA (HCC): ICD-10-CM

## 2024-02-29 DIAGNOSIS — R11.2 CHEMOTHERAPY INDUCED NAUSEA AND VOMITING: ICD-10-CM

## 2024-02-29 DIAGNOSIS — T45.1X5A CHEMOTHERAPY INDUCED NAUSEA AND VOMITING: ICD-10-CM

## 2024-02-29 PROCEDURE — 99214 OFFICE O/P EST MOD 30 MIN: CPT | Performed by: INTERNAL MEDICINE

## 2024-02-29 PROCEDURE — 99211 OFF/OP EST MAY X REQ PHY/QHP: CPT

## 2024-02-29 PROCEDURE — 3017F COLORECTAL CA SCREEN DOC REV: CPT | Performed by: INTERNAL MEDICINE

## 2024-02-29 PROCEDURE — 1036F TOBACCO NON-USER: CPT | Performed by: INTERNAL MEDICINE

## 2024-02-29 PROCEDURE — G8427 DOCREV CUR MEDS BY ELIG CLIN: HCPCS | Performed by: INTERNAL MEDICINE

## 2024-02-29 PROCEDURE — G8420 CALC BMI NORM PARAMETERS: HCPCS | Performed by: INTERNAL MEDICINE

## 2024-02-29 PROCEDURE — G8484 FLU IMMUNIZE NO ADMIN: HCPCS | Performed by: INTERNAL MEDICINE

## 2024-02-29 RX ORDER — ONDANSETRON 8 MG/1
8 TABLET, ORALLY DISINTEGRATING ORAL EVERY 8 HOURS PRN
Qty: 90 TABLET | Refills: 4 | Status: SHIPPED | OUTPATIENT
Start: 2024-02-29

## 2024-02-29 NOTE — PROGRESS NOTES
MA Rooming Questions  Patient: Denisa Basurto  MRN: [id number]    Date: 2/29/2024        1. Do you have any new issues?   no         2. Do you need any refills on medications?    yes - Zofran disintegrating tablet    3. Have you had any imaging done since your last visit?   Yes [facility]    4. Have you been hospitalized or seen in the emergency room since your last visit here?   Yes - [facility] for biopsy    5. Did the patient have a depression screening completed today? No    No data recorded     PHQ-9 Given to (if applicable):               PHQ-9 Score (if applicable):                     [] Positive     []  Negative              Does question #9 need addressed (if applicable)                     [] Yes    []  No               Kelsie Ayala CMA      
for: \"PROTIME\", \"INR\", \"APTT\", \"DDIMER\"  Anemia Panel:  No results found for: \"PCHPUFSU77\", \"FOLATE\"  Tumor Markers:  No results found for: \"\", \"CEA\", \"\", \"LABCA2\", \"PSA\"     Observations:  No data recorded     Assessment:  Esophageal adenocarcinoma  Left upper lobe well-differentiated neuroendocrine tumor    Plan:  Denisa Basurto is a 58 y.o. female with synchronous CHELE well differentiated neuroendocrine tumor and adenocarcinoma of the esophageal/GEJ.     Since we couldn't able to pass pediatric endoscopy tube, I don't think EUS can be done properly. In view of her size of the tumor, I believe she has at least cT2/3  disease.     She was seen by CT surgeon, Dr. Figueroa at [facility] on 2/1/24. He scheduled for CT guided biopsy of second lung nodule (to r/o second lung primary or metastasis form GE junction adenocarcinoma) and it is scheduled to be done on 2/12/24.     I requested MSI which came back unstable. PD-L1 for Keytruda - expressed (CPS >10) and Her2 negative (score 1+).     PET/CT scan on 1/27/24 showed Intensely hypermetabolic distal esophageal mass extending to the gastric cardia and fundus, compatible with history of gastric adenocarcinoma. Two hypermetabolic left upper lobe pulmonary nodules, one of which is cavitary, most concerning for pulmonary metastases. Low-density nodularity of bilateral adrenal glands, favoring adrenal adenomas.    Since she has MSI unstable disease, I started neoadjuvant chemotherapy with pembrolizumab since 2/6/2024.     On February 29, 2024, she presented to me for follow up. 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    She is here for close monitoring of toxicity and side effects from immunotherapy. She is s/p 2nd cycle of chemo and she is tolerating current chemo well. She doesn't encounter any major side effects from immunotherapy. I recommend to continue with

## 2024-03-15 DIAGNOSIS — Z11.59 SCREENING FOR VIRAL DISEASE: ICD-10-CM

## 2024-03-15 DIAGNOSIS — Z79.899 ENCOUNTER FOR LONG-TERM (CURRENT) USE OF MEDICATIONS: ICD-10-CM

## 2024-03-15 DIAGNOSIS — C15.9 ESOPHAGEAL ADENOCARCINOMA (HCC): Primary | ICD-10-CM

## 2024-03-17 PROBLEM — T45.1X5A CHEMOTHERAPY INDUCED NAUSEA AND VOMITING: Status: ACTIVE | Noted: 2024-03-17

## 2024-03-17 PROBLEM — R11.2 CHEMOTHERAPY INDUCED NAUSEA AND VOMITING: Status: ACTIVE | Noted: 2024-03-17

## 2024-03-18 ENCOUNTER — HOSPITAL ENCOUNTER (OUTPATIENT)
Dept: INFUSION THERAPY | Age: 59
Discharge: HOME | End: 2024-03-18
Payer: MEDICAID

## 2024-03-18 DIAGNOSIS — Z11.59 SCREENING FOR VIRAL DISEASE: ICD-10-CM

## 2024-03-18 DIAGNOSIS — C15.9 ESOPHAGEAL ADENOCARCINOMA (HCC): ICD-10-CM

## 2024-03-18 DIAGNOSIS — Z79.899 ENCOUNTER FOR LONG-TERM (CURRENT) USE OF MEDICATIONS: ICD-10-CM

## 2024-03-18 LAB
ALBUMIN SERPL-MCNC: 3.7 GM/DL (ref 3.4–5)
ALP BLD-CCNC: 96 IU/L (ref 40–129)
ALT SERPL-CCNC: 45 U/L (ref 10–40)
ANION GAP SERPL CALCULATED.3IONS-SCNC: 11 MMOL/L (ref 7–16)
AST SERPL-CCNC: 28 IU/L (ref 15–37)
BASOPHILS ABSOLUTE: 0 K/CU MM
BASOPHILS RELATIVE PERCENT: 0.2 % (ref 0–1)
BILIRUB SERPL-MCNC: 0.3 MG/DL (ref 0–1)
BUN SERPL-MCNC: 20 MG/DL (ref 6–23)
CALCIUM SERPL-MCNC: 9.1 MG/DL (ref 8.3–10.6)
CHLORIDE BLD-SCNC: 100 MMOL/L (ref 99–110)
CO2: 29 MMOL/L (ref 21–32)
CREAT SERPL-MCNC: 0.9 MG/DL (ref 0.6–1.1)
DIFFERENTIAL TYPE: ABNORMAL
EOSINOPHILS ABSOLUTE: 0.2 K/CU MM
EOSINOPHILS RELATIVE PERCENT: 5 % (ref 0–3)
GFR SERPL CREATININE-BSD FRML MDRD: >60 ML/MIN/1.73M2
GLUCOSE SERPL-MCNC: 82 MG/DL (ref 70–99)
HCT VFR BLD CALC: 38.4 % (ref 37–47)
HEMOGLOBIN: 12.5 GM/DL (ref 12.5–16)
LYMPHOCYTES ABSOLUTE: 1.2 K/CU MM
LYMPHOCYTES RELATIVE PERCENT: 26.1 % (ref 24–44)
MCH RBC QN AUTO: 30.6 PG (ref 27–31)
MCHC RBC AUTO-ENTMCNC: 32.6 % (ref 32–36)
MCV RBC AUTO: 94.1 FL (ref 78–100)
MONOCYTES ABSOLUTE: 0.5 K/CU MM
MONOCYTES RELATIVE PERCENT: 10.7 % (ref 0–4)
PDW BLD-RTO: 13.6 % (ref 11.7–14.9)
PLATELET # BLD: 255 K/CU MM (ref 140–440)
PMV BLD AUTO: 10.1 FL (ref 7.5–11.1)
POTASSIUM SERPL-SCNC: 4.3 MMOL/L (ref 3.5–5.1)
RBC # BLD: 4.08 M/CU MM (ref 4.2–5.4)
SEGMENTED NEUTROPHILS ABSOLUTE COUNT: 2.7 K/CU MM
SEGMENTED NEUTROPHILS RELATIVE PERCENT: 58 % (ref 36–66)
SODIUM BLD-SCNC: 140 MMOL/L (ref 135–145)
TOTAL PROTEIN: 6.8 GM/DL (ref 6.4–8.2)
TSH SERPL DL<=0.005 MIU/L-ACNC: 2.3 UIU/ML (ref 0.27–4.2)
WBC # BLD: 4.6 K/CU MM (ref 4–10.5)

## 2024-03-18 PROCEDURE — 85025 COMPLETE CBC W/AUTO DIFF WBC: CPT

## 2024-03-18 PROCEDURE — 84443 ASSAY THYROID STIM HORMONE: CPT

## 2024-03-18 PROCEDURE — 36415 COLL VENOUS BLD VENIPUNCTURE: CPT

## 2024-03-18 PROCEDURE — 80053 COMPREHEN METABOLIC PANEL: CPT

## 2024-03-19 ENCOUNTER — HOSPITAL ENCOUNTER (OUTPATIENT)
Dept: INFUSION THERAPY | Age: 59
Discharge: HOME | End: 2024-03-19
Payer: MEDICAID

## 2024-03-19 ENCOUNTER — TELEPHONE (OUTPATIENT)
Dept: ONCOLOGY | Age: 59
End: 2024-03-19

## 2024-03-19 ENCOUNTER — OFFICE VISIT (OUTPATIENT)
Dept: ONCOLOGY | Age: 59
End: 2024-03-19

## 2024-03-19 VITALS
HEIGHT: 68 IN | SYSTOLIC BLOOD PRESSURE: 105 MMHG | BODY MASS INDEX: 24.07 KG/M2 | OXYGEN SATURATION: 97 % | RESPIRATION RATE: 18 BRPM | WEIGHT: 158.8 LBS | TEMPERATURE: 97.9 F | DIASTOLIC BLOOD PRESSURE: 59 MMHG | HEART RATE: 67 BPM

## 2024-03-19 VITALS
TEMPERATURE: 97.9 F | DIASTOLIC BLOOD PRESSURE: 59 MMHG | BODY MASS INDEX: 23.95 KG/M2 | HEIGHT: 68 IN | HEART RATE: 67 BPM | WEIGHT: 158 LBS | OXYGEN SATURATION: 97 % | RESPIRATION RATE: 18 BRPM | SYSTOLIC BLOOD PRESSURE: 105 MMHG

## 2024-03-19 DIAGNOSIS — T45.1X5A CHEMOTHERAPY INDUCED NAUSEA AND VOMITING: ICD-10-CM

## 2024-03-19 DIAGNOSIS — R11.2 CHEMOTHERAPY INDUCED NAUSEA AND VOMITING: ICD-10-CM

## 2024-03-19 DIAGNOSIS — Z79.899 ENCOUNTER FOR LONG-TERM (CURRENT) USE OF MEDICATIONS: Primary | ICD-10-CM

## 2024-03-19 DIAGNOSIS — Z79.899 ENCOUNTER FOR LONG-TERM (CURRENT) USE OF MEDICATIONS: ICD-10-CM

## 2024-03-19 DIAGNOSIS — C15.9 ESOPHAGEAL ADENOCARCINOMA (HCC): ICD-10-CM

## 2024-03-19 DIAGNOSIS — Z11.59 SCREENING FOR VIRAL DISEASE: ICD-10-CM

## 2024-03-19 DIAGNOSIS — C15.9 ESOPHAGEAL ADENOCARCINOMA (HCC): Primary | ICD-10-CM

## 2024-03-19 PROCEDURE — 96413 CHEMO IV INFUSION 1 HR: CPT

## 2024-03-19 PROCEDURE — 6360000002 HC RX W HCPCS: Performed by: INTERNAL MEDICINE

## 2024-03-19 PROCEDURE — 2580000003 HC RX 258: Performed by: INTERNAL MEDICINE

## 2024-03-19 RX ORDER — ONDANSETRON 2 MG/ML
8 INJECTION INTRAMUSCULAR; INTRAVENOUS
Status: CANCELLED | OUTPATIENT
Start: 2024-03-19

## 2024-03-19 RX ORDER — ONDANSETRON 2 MG/ML
8 INJECTION INTRAMUSCULAR; INTRAVENOUS
OUTPATIENT
Start: 2024-04-30

## 2024-03-19 RX ORDER — SODIUM CHLORIDE 0.9 % (FLUSH) 0.9 %
5-40 SYRINGE (ML) INJECTION PRN
OUTPATIENT
Start: 2024-04-09

## 2024-03-19 RX ORDER — SODIUM CHLORIDE 9 MG/ML
5-250 INJECTION, SOLUTION INTRAVENOUS PRN
OUTPATIENT
Start: 2024-04-30

## 2024-03-19 RX ORDER — SODIUM CHLORIDE 9 MG/ML
5-250 INJECTION, SOLUTION INTRAVENOUS PRN
OUTPATIENT
Start: 2024-04-09

## 2024-03-19 RX ORDER — HEPARIN SODIUM (PORCINE) LOCK FLUSH IV SOLN 100 UNIT/ML 100 UNIT/ML
500 SOLUTION INTRAVENOUS PRN
Status: CANCELLED | OUTPATIENT
Start: 2024-03-19

## 2024-03-19 RX ORDER — MEPERIDINE HYDROCHLORIDE 50 MG/ML
12.5 INJECTION INTRAMUSCULAR; INTRAVENOUS; SUBCUTANEOUS PRN
OUTPATIENT
Start: 2024-04-09

## 2024-03-19 RX ORDER — MEPERIDINE HYDROCHLORIDE 50 MG/ML
12.5 INJECTION INTRAMUSCULAR; INTRAVENOUS; SUBCUTANEOUS PRN
Status: CANCELLED | OUTPATIENT
Start: 2024-03-19

## 2024-03-19 RX ORDER — SENNOSIDES 8.8 MG/5ML
8.8 LIQUID ORAL 3 TIMES DAILY PRN
Qty: 450 ML | Refills: 1 | Status: SHIPPED | OUTPATIENT
Start: 2024-03-19 | End: 2024-04-18

## 2024-03-19 RX ORDER — ALBUTEROL SULFATE 90 UG/1
4 AEROSOL, METERED RESPIRATORY (INHALATION) PRN
Status: CANCELLED | OUTPATIENT
Start: 2024-03-19

## 2024-03-19 RX ORDER — EPINEPHRINE 1 MG/ML
0.3 INJECTION, SOLUTION, CONCENTRATE INTRAVENOUS PRN
OUTPATIENT
Start: 2024-04-30

## 2024-03-19 RX ORDER — SODIUM CHLORIDE 9 MG/ML
INJECTION, SOLUTION INTRAVENOUS CONTINUOUS
OUTPATIENT
Start: 2024-04-09

## 2024-03-19 RX ORDER — SODIUM CHLORIDE 9 MG/ML
5-250 INJECTION, SOLUTION INTRAVENOUS PRN
Status: DISCONTINUED | OUTPATIENT
Start: 2024-03-19 | End: 2024-03-20 | Stop reason: HOSPADM

## 2024-03-19 RX ORDER — FAMOTIDINE 40 MG/5ML
20 POWDER, FOR SUSPENSION ORAL 2 TIMES DAILY
Qty: 150 ML | Refills: 3 | Status: SHIPPED | OUTPATIENT
Start: 2024-03-19 | End: 2024-04-18

## 2024-03-19 RX ORDER — NICOTINE 21 MG/24HR
1 PATCH, TRANSDERMAL 24 HOURS TRANSDERMAL DAILY
Qty: 30 PATCH | Refills: 1 | Status: SHIPPED | OUTPATIENT
Start: 2024-03-19 | End: 2024-04-18

## 2024-03-19 RX ORDER — SODIUM CHLORIDE 9 MG/ML
5-250 INJECTION, SOLUTION INTRAVENOUS PRN
Status: CANCELLED | OUTPATIENT
Start: 2024-03-19

## 2024-03-19 RX ORDER — HEPARIN SODIUM (PORCINE) LOCK FLUSH IV SOLN 100 UNIT/ML 100 UNIT/ML
500 SOLUTION INTRAVENOUS PRN
OUTPATIENT
Start: 2024-04-30

## 2024-03-19 RX ORDER — ONDANSETRON 2 MG/ML
8 INJECTION INTRAMUSCULAR; INTRAVENOUS
OUTPATIENT
Start: 2024-04-09

## 2024-03-19 RX ORDER — ACETAMINOPHEN 325 MG/1
650 TABLET ORAL
Status: CANCELLED | OUTPATIENT
Start: 2024-03-19

## 2024-03-19 RX ORDER — DIPHENHYDRAMINE HYDROCHLORIDE 50 MG/ML
50 INJECTION INTRAMUSCULAR; INTRAVENOUS
OUTPATIENT
Start: 2024-04-09

## 2024-03-19 RX ORDER — FAMOTIDINE 10 MG/ML
20 INJECTION, SOLUTION INTRAVENOUS
OUTPATIENT
Start: 2024-04-09

## 2024-03-19 RX ORDER — ALBUTEROL SULFATE 90 UG/1
4 AEROSOL, METERED RESPIRATORY (INHALATION) PRN
OUTPATIENT
Start: 2024-04-09

## 2024-03-19 RX ORDER — FAMOTIDINE 10 MG/ML
20 INJECTION, SOLUTION INTRAVENOUS
Status: CANCELLED | OUTPATIENT
Start: 2024-03-19

## 2024-03-19 RX ORDER — SODIUM CHLORIDE 0.9 % (FLUSH) 0.9 %
5-40 SYRINGE (ML) INJECTION PRN
Status: CANCELLED | OUTPATIENT
Start: 2024-03-19

## 2024-03-19 RX ORDER — DIPHENHYDRAMINE HYDROCHLORIDE 50 MG/ML
50 INJECTION INTRAMUSCULAR; INTRAVENOUS
Status: CANCELLED | OUTPATIENT
Start: 2024-03-19

## 2024-03-19 RX ORDER — SODIUM CHLORIDE 0.9 % (FLUSH) 0.9 %
5-40 SYRINGE (ML) INJECTION PRN
Status: DISCONTINUED | OUTPATIENT
Start: 2024-03-19 | End: 2024-03-20 | Stop reason: HOSPADM

## 2024-03-19 RX ORDER — HYDROXYZINE HCL 10 MG/5 ML
25 SOLUTION, ORAL ORAL 4 TIMES DAILY PRN
Qty: 500 ML | Refills: 2 | Status: SHIPPED
Start: 2024-03-19 | End: 2024-03-20 | Stop reason: RX

## 2024-03-19 RX ORDER — SODIUM CHLORIDE 9 MG/ML
INJECTION, SOLUTION INTRAVENOUS CONTINUOUS
Status: CANCELLED | OUTPATIENT
Start: 2024-03-19

## 2024-03-19 RX ORDER — FAMOTIDINE 10 MG/ML
20 INJECTION, SOLUTION INTRAVENOUS
OUTPATIENT
Start: 2024-04-30

## 2024-03-19 RX ORDER — EPINEPHRINE 1 MG/ML
0.3 INJECTION, SOLUTION, CONCENTRATE INTRAVENOUS PRN
Status: CANCELLED | OUTPATIENT
Start: 2024-03-19

## 2024-03-19 RX ORDER — OXYCODONE HCL 20 MG/ML
5 CONCENTRATE, ORAL ORAL EVERY 6 HOURS PRN
Qty: 30 ML | Refills: 0 | Status: SHIPPED | OUTPATIENT
Start: 2024-03-19 | End: 2024-04-18

## 2024-03-19 RX ORDER — HEPARIN SODIUM (PORCINE) LOCK FLUSH IV SOLN 100 UNIT/ML 100 UNIT/ML
500 SOLUTION INTRAVENOUS PRN
OUTPATIENT
Start: 2024-04-09

## 2024-03-19 RX ORDER — ACETAMINOPHEN 325 MG/1
650 TABLET ORAL
OUTPATIENT
Start: 2024-04-30

## 2024-03-19 RX ORDER — SODIUM CHLORIDE 0.9 % (FLUSH) 0.9 %
5-40 SYRINGE (ML) INJECTION PRN
OUTPATIENT
Start: 2024-04-30

## 2024-03-19 RX ORDER — DIPHENHYDRAMINE HYDROCHLORIDE 50 MG/ML
50 INJECTION INTRAMUSCULAR; INTRAVENOUS
OUTPATIENT
Start: 2024-04-30

## 2024-03-19 RX ORDER — EPINEPHRINE 1 MG/ML
0.3 INJECTION, SOLUTION, CONCENTRATE INTRAVENOUS PRN
OUTPATIENT
Start: 2024-04-09

## 2024-03-19 RX ORDER — MEPERIDINE HYDROCHLORIDE 50 MG/ML
12.5 INJECTION INTRAMUSCULAR; INTRAVENOUS; SUBCUTANEOUS PRN
OUTPATIENT
Start: 2024-04-30

## 2024-03-19 RX ORDER — SODIUM CHLORIDE 9 MG/ML
INJECTION, SOLUTION INTRAVENOUS CONTINUOUS
OUTPATIENT
Start: 2024-04-30

## 2024-03-19 RX ORDER — ACETAMINOPHEN 325 MG/1
650 TABLET ORAL
OUTPATIENT
Start: 2024-04-09

## 2024-03-19 RX ORDER — ALBUTEROL SULFATE 90 UG/1
4 AEROSOL, METERED RESPIRATORY (INHALATION) PRN
OUTPATIENT
Start: 2024-04-30

## 2024-03-19 RX ADMIN — SODIUM CHLORIDE 200 MG: 9 INJECTION, SOLUTION INTRAVENOUS at 10:05

## 2024-03-19 RX ADMIN — SODIUM CHLORIDE 20 ML/HR: 9 INJECTION, SOLUTION INTRAVENOUS at 10:03

## 2024-03-19 ASSESSMENT — PAIN SCALES - GENERAL: PAINLEVEL_OUTOF10: 5

## 2024-03-19 NOTE — PROGRESS NOTES
Pt arrived for OV and Keytruda infusion.  Labs reviewed from 3/18/24, within treatment parameters.  PIV placed without difficulty.  Positive blood return noted.  Flushed and locked with normal saline.  Assessment complete, states has been having joint pain and itching to back since starting Keytruda, both of which have been managed.  Denies other concerns or questions at this time.     Treatment plan approved, released and completed as ordered.  PIV removed per protocol.  Pt tolerated well.  Pt instructed to stop at desk upon discharge to schedule next OV.  Discharge ambulatory in stable condition.  Ruthie Mulligan RN

## 2024-03-19 NOTE — PROGRESS NOTES
MA Rooming Questions  Patient: Denisa Basurto  MRN: [id number]    Date: 3/19/2024        1. Do you have any new issues?   yes -  has some questions        2. Do you need any refills on medications?    no    3. Have you had any imaging done since your last visit?   no    4. Have you been hospitalized or seen in the emergency room since your last visit here?   no    5. Did the patient have a depression screening completed today? No    No data recorded     PHQ-9 Given to (if applicable):               PHQ-9 Score (if applicable):                     [] Positive     []  Negative              Does question #9 need addressed (if applicable)                     [] Yes    []  No               Jesenia Ahumada CMA      
\"LD\"  Lab Results   Component Value Date    TSHHS 2.300 03/18/2024     Immunology:  Lab Results   Component Value Date    PROT 6.8 03/18/2024     No results found for: \"KAPPAUVOL\", \"LAMBDAUVOL\", \"KLFLCR\"  No results found for: \"B2M\"  Coagulation Panel:  No results found for: \"PROTIME\", \"INR\", \"APTT\", \"DDIMER\"  Anemia Panel:  No results found for: \"[id number]\", \"FOLATE\"  Tumor Markers:  No results found for: \"\", \"CEA\", \"\", \"LABCA2\", \"PSA\"     Observations:  No data recorded     Assessment:  Esophageal adenocarcinoma  Left upper lobe well-differentiated neuroendocrine tumor    Plan:  Denisa Basurto is a 58 y.o. female with synchronous CHELE well differentiated neuroendocrine tumor and adenocarcinoma of the esophageal/GEJ.     Since we couldn't able to pass pediatric endoscopy tube, I don't think EUS can be done properly. In view of her size of the tumor, I believe she has at least cT2/3  disease.     She was seen by CT surgeon, Dr. Figueroa at [facility] on 2/1/24. He scheduled for CT guided biopsy of second lung nodule (to r/o second lung primary or metastasis form GE junction adenocarcinoma) and it is scheduled to be done on 2/12/24.     I requested MSI which came back unstable. PD-L1 for Keytruda - expressed (CPS >10) and Her2 negative (score 1+).     PET/CT scan on 1/27/24 showed Intensely hypermetabolic distal esophageal mass extending to the gastric cardia and fundus, compatible with history of gastric adenocarcinoma. Two hypermetabolic left upper lobe pulmonary nodules, one of which is cavitary, most concerning for pulmonary metastases. Low-density nodularity of bilateral adrenal glands, favoring adrenal adenomas.    Since she has MSI unstable disease, I started neoadjuvant chemotherapy with pembrolizumab since 2/6/2024.     On March 19, 2024, she presented to me for follow up. I have been following her for GE junction adenocarcinoma and she is currently on neoadjuvant chemotherapy with pembrolizumab since

## 2024-03-20 ENCOUNTER — OFFICE VISIT (OUTPATIENT)
Dept: ONCOLOGY | Age: 59
End: 2024-03-20
Payer: MEDICAID

## 2024-03-20 ENCOUNTER — HOSPITAL ENCOUNTER (OUTPATIENT)
Dept: INFUSION THERAPY | Age: 59
Discharge: HOME | End: 2024-03-20
Payer: MEDICAID

## 2024-03-20 VITALS
HEIGHT: 68 IN | BODY MASS INDEX: 23.7 KG/M2 | WEIGHT: 156.4 LBS | OXYGEN SATURATION: 98 % | DIASTOLIC BLOOD PRESSURE: 67 MMHG | HEART RATE: 90 BPM | SYSTOLIC BLOOD PRESSURE: 92 MMHG | TEMPERATURE: 98 F | RESPIRATION RATE: 16 BRPM

## 2024-03-20 DIAGNOSIS — C15.9 ESOPHAGEAL ADENOCARCINOMA (HCC): Primary | ICD-10-CM

## 2024-03-20 PROCEDURE — 99401 PREV MED CNSL INDIV APPRX 15: CPT | Performed by: INTERNAL MEDICINE

## 2024-03-20 PROCEDURE — G8484 FLU IMMUNIZE NO ADMIN: HCPCS | Performed by: INTERNAL MEDICINE

## 2024-03-20 PROCEDURE — 99211 OFF/OP EST MAY X REQ PHY/QHP: CPT

## 2024-03-20 RX ORDER — HYDROXYZINE HYDROCHLORIDE 25 MG/1
25 TABLET, FILM COATED ORAL EVERY 6 HOURS PRN
Qty: 40 TABLET | Refills: 2 | Status: SHIPPED | OUTPATIENT
Start: 2024-03-20

## 2024-03-20 NOTE — PROGRESS NOTES
Palliative Care  Assessment    Medical Oncology History:    Late December, 2023 was evaluated for was symptoms of significant weight loss, abdominal discomfort, vomiting and the difficulty swallowing.  The symptoms had been going on for few months and she had lost more than 40 pounds.  CT December 20, 2023 heterogeneous solid mass in the gastric cardia and gastroesophageal junction 6.8 x 7.7 cm.  CT chest 2.4 cm noncalcified pulmonary nodule left upper lobe.  EGD December 29, 2023 obstructing distal esophageal mass ulcerated and necrotic, pediatric scope could not be passed through.  Biopsy moderately differentiated adenocarcinoma.  January 2, 2024 left upper lobe lung biopsy pathology well-differentiated neuroendocrine tumor consistent with a typical carcinoid.  A PET scan January 25, 2024 hypermetabolic distal esophageal mass extending to the gastric cardia and fundus.  2 hypermetabolic left upper lobe pulmonary nodules.  February 6, 2024 started on neoadjuvant therapy with Keytruda  March 28, 2024 was still being treated with Keytruda      Other Medical Problems:   In general she has been fairly healthy     Personal History     A. Life Time:   She was  in early 2000, had 3 grown children currently ages 31, 33 and 35.  Has 10 grandchildren.  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  Currently she has a very nice boyfriend they have been together for more than 13 years.  She has long history of smoking, still does.  No history of excessive ethanol intake     B. Family:   As above    Physical Symptoms:   March 20, 2024 has had 3 injections of Keytruda which in general she has tolerated fairly has noted some muscle and joint discomfort.  She still has been able to swallow semisolid.  Has not lost any more weight and is gaining some weight.  Pain is still quite well-controlled with the oxycodone.   She was doing continuous tube feeding and also was now starting to eat some

## 2024-03-20 NOTE — TELEPHONE ENCOUNTER
This nurse called [place], [address], [facility], [facility]. The medication is not available and is on backorder. This nurse notified the physician and the medication was changed to tablets. This nurse also called the patient and notified her that the syrup is not available and she is agreeable to trying the tablets.

## 2024-03-20 NOTE — PROGRESS NOTES
MA Rooming Questions  Patient: Denisa Basurto  MRN: [id number]    Date: 3/20/2024        1. Do you have any new issues?   yes - c/o increased general weakness         2. Do you need any refills on medications?    no    3. Have you had any imaging done since your last visit?   no    4. Have you been hospitalized or seen in the emergency room since your last visit here?   no    5. Did the patient have a depression screening completed today? No    No data recorded     PHQ-9 Given to (if applicable):               PHQ-9 Score (if applicable):                     [] Positive     []  Negative              Does question #9 need addressed (if applicable)                     [] Yes    []  No               Jesenia Ahumada CMA

## 2024-04-08 ENCOUNTER — HOSPITAL ENCOUNTER (OUTPATIENT)
Dept: INFUSION THERAPY | Age: 59
Discharge: HOME | End: 2024-04-08
Payer: MEDICAID

## 2024-04-08 DIAGNOSIS — C15.9 ESOPHAGEAL ADENOCARCINOMA (HCC): ICD-10-CM

## 2024-04-08 DIAGNOSIS — Z79.899 ENCOUNTER FOR LONG-TERM (CURRENT) USE OF MEDICATIONS: ICD-10-CM

## 2024-04-08 DIAGNOSIS — Z11.59 SCREENING FOR VIRAL DISEASE: ICD-10-CM

## 2024-04-08 LAB
ALBUMIN SERPL-MCNC: 3.8 GM/DL (ref 3.4–5)
ALP BLD-CCNC: 73 IU/L (ref 40–128)
ALT SERPL-CCNC: 25 U/L (ref 10–40)
ANION GAP SERPL CALCULATED.3IONS-SCNC: 9 MMOL/L (ref 7–16)
AST SERPL-CCNC: 20 IU/L (ref 15–37)
BASOPHILS ABSOLUTE: 0 K/CU MM
BASOPHILS RELATIVE PERCENT: 0.2 % (ref 0–1)
BILIRUB SERPL-MCNC: 0.4 MG/DL (ref 0–1)
BUN SERPL-MCNC: 21 MG/DL (ref 6–23)
CALCIUM SERPL-MCNC: 9.2 MG/DL (ref 8.3–10.6)
CHLORIDE BLD-SCNC: 100 MMOL/L (ref 99–110)
CO2: 30 MMOL/L (ref 21–32)
CREAT SERPL-MCNC: 0.8 MG/DL (ref 0.6–1.1)
DIFFERENTIAL TYPE: ABNORMAL
EOSINOPHILS ABSOLUTE: 0.3 K/CU MM
EOSINOPHILS RELATIVE PERCENT: 6.7 % (ref 0–3)
GFR SERPL CREATININE-BSD FRML MDRD: 85 ML/MIN/1.73M2
GLUCOSE SERPL-MCNC: 90 MG/DL (ref 70–99)
HCT VFR BLD CALC: 40.4 % (ref 37–47)
HEMOGLOBIN: 13.1 GM/DL (ref 12.5–16)
LYMPHOCYTES ABSOLUTE: 1.1 K/CU MM
LYMPHOCYTES RELATIVE PERCENT: 28.4 % (ref 24–44)
MCH RBC QN AUTO: 29.5 PG (ref 27–31)
MCHC RBC AUTO-ENTMCNC: 32.4 % (ref 32–36)
MCV RBC AUTO: 91 FL (ref 78–100)
MONOCYTES ABSOLUTE: 0.4 K/CU MM
MONOCYTES RELATIVE PERCENT: 10.9 % (ref 0–4)
PDW BLD-RTO: 13.5 % (ref 11.7–14.9)
PLATELET # BLD: 234 K/CU MM (ref 140–440)
PMV BLD AUTO: 10.2 FL (ref 7.5–11.1)
POTASSIUM SERPL-SCNC: 4.1 MMOL/L (ref 3.5–5.1)
RBC # BLD: 4.44 M/CU MM (ref 4.2–5.4)
SEGMENTED NEUTROPHILS ABSOLUTE COUNT: 2.2 K/CU MM
SEGMENTED NEUTROPHILS RELATIVE PERCENT: 53.8 % (ref 36–66)
SODIUM BLD-SCNC: 139 MMOL/L (ref 135–145)
TOTAL PROTEIN: 6.8 GM/DL (ref 6.4–8.2)
WBC # BLD: 4 K/CU MM (ref 4–10.5)

## 2024-04-08 PROCEDURE — 36415 COLL VENOUS BLD VENIPUNCTURE: CPT

## 2024-04-08 PROCEDURE — 80053 COMPREHEN METABOLIC PANEL: CPT

## 2024-04-08 PROCEDURE — 85025 COMPLETE CBC W/AUTO DIFF WBC: CPT

## 2024-04-09 ENCOUNTER — HOSPITAL ENCOUNTER (OUTPATIENT)
Dept: INFUSION THERAPY | Age: 59
Discharge: HOME | End: 2024-04-09
Payer: MEDICAID

## 2024-04-09 ENCOUNTER — OFFICE VISIT (OUTPATIENT)
Dept: ONCOLOGY | Age: 59
End: 2024-04-09
Payer: MEDICAID

## 2024-04-09 VITALS
TEMPERATURE: 97.6 F | DIASTOLIC BLOOD PRESSURE: 59 MMHG | RESPIRATION RATE: 16 BRPM | HEART RATE: 64 BPM | OXYGEN SATURATION: 99 % | BODY MASS INDEX: 22.61 KG/M2 | WEIGHT: 149.2 LBS | SYSTOLIC BLOOD PRESSURE: 102 MMHG | HEIGHT: 68 IN

## 2024-04-09 DIAGNOSIS — C15.9 ESOPHAGEAL ADENOCARCINOMA (HCC): ICD-10-CM

## 2024-04-09 DIAGNOSIS — C15.9 ESOPHAGEAL ADENOCARCINOMA (HCC): Primary | ICD-10-CM

## 2024-04-09 DIAGNOSIS — R11.2 CHEMOTHERAPY INDUCED NAUSEA AND VOMITING: ICD-10-CM

## 2024-04-09 DIAGNOSIS — T45.1X5A CHEMOTHERAPY INDUCED NAUSEA AND VOMITING: ICD-10-CM

## 2024-04-09 DIAGNOSIS — Z11.59 SCREENING FOR VIRAL DISEASE: ICD-10-CM

## 2024-04-09 DIAGNOSIS — Z79.899 ENCOUNTER FOR LONG-TERM (CURRENT) USE OF MEDICATIONS: Primary | ICD-10-CM

## 2024-04-09 DIAGNOSIS — Z79.899 ENCOUNTER FOR LONG-TERM (CURRENT) USE OF MEDICATIONS: ICD-10-CM

## 2024-04-09 PROCEDURE — 3017F COLORECTAL CA SCREEN DOC REV: CPT | Performed by: INTERNAL MEDICINE

## 2024-04-09 PROCEDURE — 96413 CHEMO IV INFUSION 1 HR: CPT

## 2024-04-09 PROCEDURE — 99214 OFFICE O/P EST MOD 30 MIN: CPT | Performed by: INTERNAL MEDICINE

## 2024-04-09 PROCEDURE — G8420 CALC BMI NORM PARAMETERS: HCPCS | Performed by: INTERNAL MEDICINE

## 2024-04-09 PROCEDURE — 2580000003 HC RX 258: Performed by: INTERNAL MEDICINE

## 2024-04-09 PROCEDURE — G8427 DOCREV CUR MEDS BY ELIG CLIN: HCPCS | Performed by: INTERNAL MEDICINE

## 2024-04-09 PROCEDURE — 6360000002 HC RX W HCPCS: Performed by: INTERNAL MEDICINE

## 2024-04-09 PROCEDURE — 1036F TOBACCO NON-USER: CPT | Performed by: INTERNAL MEDICINE

## 2024-04-09 RX ORDER — SODIUM CHLORIDE 0.9 % (FLUSH) 0.9 %
5-40 SYRINGE (ML) INJECTION PRN
Status: DISCONTINUED | OUTPATIENT
Start: 2024-04-09 | End: 2024-04-10 | Stop reason: HOSPADM

## 2024-04-09 RX ADMIN — SODIUM CHLORIDE, PRESERVATIVE FREE 10 ML: 5 INJECTION INTRAVENOUS at 11:32

## 2024-04-09 RX ADMIN — SODIUM CHLORIDE 200 MG: 9 INJECTION, SOLUTION INTRAVENOUS at 10:56

## 2024-04-09 NOTE — PROGRESS NOTES
MA Rooming Questions  Patient: Denisa Basurto  MRN: [id number]    Date: 4/9/2024        1. Do you have any new issues?   no         2. Do you need any refills on medications?    no    3. Have you had any imaging done since your last visit?   no    4. Have you been hospitalized or seen in the emergency room since your last visit here?   no    5. Did the patient have a depression screening completed today? No    No data recorded     PHQ-9 Given to (if applicable):               PHQ-9 Score (if applicable):                     [] Positive     []  Negative              Does question #9 need addressed (if applicable)                     [] Yes    []  No               Jesenia Ahumada CMA      
permanent loss of vision, No scotomata.  ENT / Mouth:  No epistaxis, No dysphagia, No hoarseness, No oral ulcers, No gingival bleeding.  No sore throat, No postnasal drip, No nasal drip, No mouth pain, No sinus pain, No tinnitus, Normal hearing.  Cardiovascular:  No chest pain, No palpitations, No syncope, No upper extremity edema, No lower extremity edema, No calf discomfort.  Respiratory:  No cough.  No hemoptysis, No pleurisy, No wheezing, No dyspnea.  Breast:  No breast mass, No pain, No nipple discharge, No change in size, No change in shape.  Gastrointestinal:  No abdominal pain, No abdominal cramping, No nausea, No vomiting, No constipation, No diarrhea, No hematochezia, No melena, No jaundice, No dyspepsia, Has dysphagia.  Urinary:  No dysuria, No hematuria, No urinary incontinence.  Gynecological:  No vaginal discharge, No suprapubic pain, No abnormal vaginal bleeding.  (Female Patients Only)  Musculoskeletal:  No muscle pain, No swollen joints, No joint redness, No bone pain, No spine tenderness.  Skin:  No rash, No nodules, No pruritus, No lesions.  Neurologic:  No confusion, No seizures, No syncope, No tremor, No speech change, No headache, No hiccups, No abnormal gait, No sensory changes, No weakness.  Psychiatric:  No depression, No anxiety, Concentration normal.  Endocrine:  No polyuria, No polydipsia, No hot flashes, No thyroid symptoms.  Hematologic:  No epistaxis, No gingival bleeding, No petechiae, No ecchymosis.  Lymphatic:  No lymphadenopathy, No lymphedema.  Allergy / Immunologic:  No eczema, No frequent mucous infections, No frequent respiratory infections, No recurrent urticarial, No frequent skin infections.     Vital Signs: BP (!) 102/59 (Site: Left Upper Arm, Position: Sitting, Cuff Size: Medium Adult)   Pulse 64   Temp 97.6 °F (36.4 °C) (Temporal)   Resp 16   Ht 1.727 m (5' 7.99\")   Wt 67.7 kg (149 lb 3.2 oz)   SpO2 99%   BMI 22.69 kg/m²      Physical Exam:  CONSTITUTIONAL: awake,

## 2024-04-09 NOTE — PROGRESS NOTES
Status appropriately assessed and documented. All required labs and results reviewed. Treatment approved by provider. Treatment orders and medications verified by 2 Registered Nurses where applicable. Treatment plan was confirmed with patient prior to administration, and educated the need to report any treatment-related symptoms      Ambulated to infusion area, here today for chemotherapy. Had an OV with Dr. Guthrie, no concerns at this time. Right hand PIV, positive blood return noted. Labs reviewed, Labs within defined limits.  Chemo administered as ordered. Call light within reach. Tolerated infusion without incident.  Discharge instructions provided.

## 2024-04-17 ENCOUNTER — TELEPHONE (OUTPATIENT)
Dept: ONCOLOGY | Age: 59
End: 2024-04-17

## 2024-04-17 NOTE — TELEPHONE ENCOUNTER
Patient called given time and prep for Ct scans to be done on 4/23/24 [facility] arrival at 11:30 AM.

## 2024-04-23 ENCOUNTER — HOSPITAL ENCOUNTER (OUTPATIENT)
Dept: CT IMAGING | Age: 59
Discharge: HOME | End: 2024-04-23
Payer: MEDICAID

## 2024-04-23 DIAGNOSIS — R11.2 CHEMOTHERAPY INDUCED NAUSEA AND VOMITING: ICD-10-CM

## 2024-04-23 DIAGNOSIS — C15.9 ESOPHAGEAL ADENOCARCINOMA (HCC): ICD-10-CM

## 2024-04-23 DIAGNOSIS — Z79.899 ENCOUNTER FOR LONG-TERM (CURRENT) USE OF MEDICATIONS: ICD-10-CM

## 2024-04-23 DIAGNOSIS — T45.1X5A CHEMOTHERAPY INDUCED NAUSEA AND VOMITING: ICD-10-CM

## 2024-04-23 PROCEDURE — 71260 CT THORAX DX C+: CPT

## 2024-04-23 PROCEDURE — 6360000004 HC RX CONTRAST MEDICATION: Performed by: INTERNAL MEDICINE

## 2024-04-23 PROCEDURE — 2580000003 HC RX 258: Performed by: INTERNAL MEDICINE

## 2024-04-23 PROCEDURE — 74177 CT ABD & PELVIS W/CONTRAST: CPT

## 2024-04-23 RX ORDER — SODIUM CHLORIDE 9 MG/ML
10 INJECTION, SOLUTION INTRAMUSCULAR; INTRAVENOUS; SUBCUTANEOUS PRN
Status: COMPLETED | OUTPATIENT
Start: 2024-04-23 | End: 2024-04-23

## 2024-04-23 RX ADMIN — SODIUM CHLORIDE, PRESERVATIVE FREE 10 ML: 5 INJECTION INTRAVENOUS at 12:30

## 2024-04-23 RX ADMIN — IOPAMIDOL 18 ML: 755 INJECTION, SOLUTION INTRAVENOUS at 11:30

## 2024-04-23 RX ADMIN — IOPAMIDOL 75 ML: 755 INJECTION, SOLUTION INTRAVENOUS at 12:30

## 2024-04-29 ENCOUNTER — HOSPITAL ENCOUNTER (OUTPATIENT)
Dept: INFUSION THERAPY | Age: 59
Discharge: HOME | End: 2024-04-29
Payer: MEDICAID

## 2024-04-29 DIAGNOSIS — Z11.59 SCREENING FOR VIRAL DISEASE: ICD-10-CM

## 2024-04-29 DIAGNOSIS — Z79.899 ENCOUNTER FOR LONG-TERM (CURRENT) USE OF MEDICATIONS: ICD-10-CM

## 2024-04-29 DIAGNOSIS — C15.9 ESOPHAGEAL ADENOCARCINOMA (HCC): ICD-10-CM

## 2024-04-29 LAB
ALBUMIN SERPL-MCNC: 3.6 GM/DL (ref 3.4–5)
ALP BLD-CCNC: 78 IU/L (ref 40–128)
ALT SERPL-CCNC: 11 U/L (ref 10–40)
ANION GAP SERPL CALCULATED.3IONS-SCNC: 12 MMOL/L (ref 7–16)
AST SERPL-CCNC: 16 IU/L (ref 15–37)
BASOPHILS ABSOLUTE: 0 K/CU MM
BASOPHILS RELATIVE PERCENT: 0 % (ref 0–1)
BILIRUB SERPL-MCNC: 0.2 MG/DL (ref 0–1)
BUN SERPL-MCNC: 16 MG/DL (ref 6–23)
CALCIUM SERPL-MCNC: 8.4 MG/DL (ref 8.3–10.6)
CHLORIDE BLD-SCNC: 100 MMOL/L (ref 99–110)
CO2: 27 MMOL/L (ref 21–32)
CREAT SERPL-MCNC: 0.8 MG/DL (ref 0.6–1.1)
DIFFERENTIAL TYPE: ABNORMAL
EOSINOPHILS ABSOLUTE: 0.2 K/CU MM
EOSINOPHILS RELATIVE PERCENT: 4 % (ref 0–3)
GFR SERPL CREATININE-BSD FRML MDRD: 85 ML/MIN/1.73M2
GLUCOSE SERPL-MCNC: 93 MG/DL (ref 70–99)
HCT VFR BLD CALC: 37.6 % (ref 37–47)
HEMOGLOBIN: 12.2 GM/DL (ref 12.5–16)
LYMPHOCYTES ABSOLUTE: 1.1 K/CU MM
LYMPHOCYTES RELATIVE PERCENT: 21.3 % (ref 24–44)
MCH RBC QN AUTO: 29 PG (ref 27–31)
MCHC RBC AUTO-ENTMCNC: 32.4 % (ref 32–36)
MCV RBC AUTO: 89.3 FL (ref 78–100)
MONOCYTES ABSOLUTE: 0.6 K/CU MM
MONOCYTES RELATIVE PERCENT: 12.7 % (ref 0–4)
NEUTROPHILS RELATIVE PERCENT: 62 % (ref 36–66)
PDW BLD-RTO: 13.9 % (ref 11.7–14.9)
PLATELET # BLD: 237 K/CU MM (ref 140–440)
PMV BLD AUTO: 9.6 FL (ref 7.5–11.1)
POTASSIUM SERPL-SCNC: 4.3 MMOL/L (ref 3.5–5.1)
RBC # BLD: 4.21 M/CU MM (ref 4.2–5.4)
SEGMENTED NEUTROPHILS ABSOLUTE COUNT: 3.1 K/CU MM
SODIUM BLD-SCNC: 139 MMOL/L (ref 135–145)
TOTAL PROTEIN: 6.6 GM/DL (ref 6.4–8.2)
TSH SERPL DL<=0.005 MIU/L-ACNC: 3.11 UIU/ML (ref 0.27–4.2)
WBC # BLD: 5 K/CU MM (ref 4–10.5)

## 2024-04-29 PROCEDURE — 80053 COMPREHEN METABOLIC PANEL: CPT

## 2024-04-29 PROCEDURE — 84443 ASSAY THYROID STIM HORMONE: CPT

## 2024-04-29 PROCEDURE — 85025 COMPLETE CBC W/AUTO DIFF WBC: CPT

## 2024-04-29 PROCEDURE — 36415 COLL VENOUS BLD VENIPUNCTURE: CPT

## 2024-04-30 ENCOUNTER — OFFICE VISIT (OUTPATIENT)
Dept: ONCOLOGY | Age: 59
End: 2024-04-30
Payer: MEDICAID

## 2024-04-30 ENCOUNTER — HOSPITAL ENCOUNTER (OUTPATIENT)
Dept: INFUSION THERAPY | Age: 59
Discharge: HOME | End: 2024-04-30
Payer: MEDICAID

## 2024-04-30 VITALS
TEMPERATURE: 97.4 F | HEART RATE: 67 BPM | SYSTOLIC BLOOD PRESSURE: 103 MMHG | BODY MASS INDEX: 23.32 KG/M2 | HEIGHT: 67 IN | RESPIRATION RATE: 16 BRPM | WEIGHT: 148.6 LBS | OXYGEN SATURATION: 98 % | DIASTOLIC BLOOD PRESSURE: 58 MMHG

## 2024-04-30 DIAGNOSIS — C15.9 ESOPHAGEAL ADENOCARCINOMA (HCC): ICD-10-CM

## 2024-04-30 DIAGNOSIS — Z79.899 ENCOUNTER FOR LONG-TERM (CURRENT) USE OF MEDICATIONS: ICD-10-CM

## 2024-04-30 DIAGNOSIS — Z79.899 ENCOUNTER FOR LONG-TERM (CURRENT) USE OF MEDICATIONS: Primary | ICD-10-CM

## 2024-04-30 DIAGNOSIS — C15.9 ESOPHAGEAL ADENOCARCINOMA (HCC): Primary | ICD-10-CM

## 2024-04-30 DIAGNOSIS — Z11.59 SCREENING FOR VIRAL DISEASE: ICD-10-CM

## 2024-04-30 DIAGNOSIS — R11.2 CHEMOTHERAPY INDUCED NAUSEA AND VOMITING: ICD-10-CM

## 2024-04-30 DIAGNOSIS — T45.1X5A CHEMOTHERAPY INDUCED NAUSEA AND VOMITING: ICD-10-CM

## 2024-04-30 PROCEDURE — 96413 CHEMO IV INFUSION 1 HR: CPT

## 2024-04-30 PROCEDURE — G8427 DOCREV CUR MEDS BY ELIG CLIN: HCPCS | Performed by: INTERNAL MEDICINE

## 2024-04-30 PROCEDURE — 2580000003 HC RX 258: Performed by: INTERNAL MEDICINE

## 2024-04-30 PROCEDURE — 99214 OFFICE O/P EST MOD 30 MIN: CPT | Performed by: INTERNAL MEDICINE

## 2024-04-30 PROCEDURE — 6360000002 HC RX W HCPCS: Performed by: INTERNAL MEDICINE

## 2024-04-30 PROCEDURE — G8420 CALC BMI NORM PARAMETERS: HCPCS | Performed by: INTERNAL MEDICINE

## 2024-04-30 PROCEDURE — 1036F TOBACCO NON-USER: CPT | Performed by: INTERNAL MEDICINE

## 2024-04-30 PROCEDURE — 3017F COLORECTAL CA SCREEN DOC REV: CPT | Performed by: INTERNAL MEDICINE

## 2024-04-30 RX ORDER — OXYCODONE HCL 20 MG/ML
5 CONCENTRATE, ORAL ORAL EVERY 4 HOURS PRN
Qty: 45 ML | Refills: 0 | Status: SHIPPED | OUTPATIENT
Start: 2024-04-30 | End: 2024-05-30

## 2024-04-30 RX ADMIN — SODIUM CHLORIDE 200 MG: 9 INJECTION, SOLUTION INTRAVENOUS at 11:48

## 2024-04-30 NOTE — PROGRESS NOTES
Ambulated to infusion area after OV with Dr. Guthrie, here today for immunotherapy. No concerns at this time. PIV placed in right hand by ALONSO Diaz, positive blood return noted. Labs reviewed, Labs within defined limits.  Keytruda administered as ordered. 2 minutes after beginning Keytruda, patient reported swelling to IV site. Infusion stopped and assessed. Per efra Almodovar, tubing primed with 20mL of saline, no intervention needed. PIV removed and ice pack placed to site. New PIV placed in left hand by ALONSO Gillespie. Patient requesting port referral. Discussed with Dr. Guthrie ok to order. Order placed for surgeon referral for port placement.Call light within reach. Tolerated infusion without incident.  Discharge instructions provided. RTC 05/20 for labs.    Status appropriately assessed and documented. All required labs and results reviewed. Treatment approved by provider. Treatment orders and medications verified by 2 Registered Nurses where applicable. Treatment plan was confirmed with patient prior to administration, and educated the need to report any treatment-related symptoms

## 2024-04-30 NOTE — PROGRESS NOTES
MA Rooming Questions  Patient: Denisa Basurto  MRN: [id number]    Date: 4/30/2024        1. Do you have any new issues?   no         2. Do you need any refills on medications?    yes - see note on encounter sheet    3. Have you had any imaging done since your last visit?   yes - ct chest 4/25    4. Have you been hospitalized or seen in the emergency room since your last visit here?   no    5. Did the patient have a depression screening completed today? No    No data recorded     PHQ-9 Given to (if applicable):               PHQ-9 Score (if applicable):                     [] Positive     []  Negative              Does question #9 need addressed (if applicable)                     [] Yes    []  No               Nat Parekh MA

## 2024-04-30 NOTE — PROGRESS NOTES
Patient Name:  Denisa Basurto  Patient :  1965  Patient MRN:  [id number]     Primary Oncologist: Anthony Guthrie MD  Referring Provider: Osbaldo Walton APRN - NP     Date of Service: 2024      Chief Complaint:    Chief Complaint   Patient presents with    Follow-up     Patient Active Problem List:     Closed fracture of left distal radius and ulna     Esophageal adenocarcinoma (HCC)    HPI:   Denisa Basurto is a 58 y.o. female with no pertinent medical history referred to me on 24 for evaluation of esophageal adenocarcinoma.     She initially presented to ED with nausea vomiting and poor oral intake.  She is having stomach issues since 10, 2023. She has been unable to keep solids down and has about 40 pound weight loss.     CT abdomen and pelvis done on 2023 showed a heterogeneous solid mass in the gastric cardia extending to the gastroesophageal junction measuring about 6.6 x 6.8 x 7.7 cm.  CT chest done 2023 showed a 2.4 cm noncalcified pulmonary nodule within the left upper lobe as well as an additional 1.6 cm nodule in the left upper lobe.     Dr. Martinez attempted an EGD on 2023 and found obstructing distal esophageal mass, which is ulcerated and necrotic. He couldn't able to pass a pediatric scope beyond the mass.  Biopsy was done and pathology revealed moderately differentiated adenocarcinoma. Surgical oncology placed a feeding tube on 2024.     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     She was seen by CT surgeon, Dr. Figueroa at [facility] on 24. He scheduled for CT guided biopsy of second lung nodule and it is scheduled to be done on 24.     I requested MSI which came back unstable. PD-L1 for Keytruda - expressed (CPS >10) and Her2 negative (score 1+).     PET/CT scan on 24 showed Intensely hypermetabolic distal

## 2024-05-08 ENCOUNTER — PREP FOR PROCEDURE (OUTPATIENT)
Dept: SURGERY | Age: 59
End: 2024-05-08

## 2024-05-08 ENCOUNTER — OFFICE VISIT (OUTPATIENT)
Dept: SURGERY | Age: 59
End: 2024-05-08
Payer: MEDICAID

## 2024-05-08 VITALS
WEIGHT: 151.5 LBS | SYSTOLIC BLOOD PRESSURE: 90 MMHG | BODY MASS INDEX: 22.96 KG/M2 | HEART RATE: 110 BPM | DIASTOLIC BLOOD PRESSURE: 62 MMHG | HEIGHT: 68 IN

## 2024-05-08 DIAGNOSIS — C15.9 ESOPHAGEAL ADENOCARCINOMA (HCC): Primary | ICD-10-CM

## 2024-05-08 PROCEDURE — 99203 OFFICE O/P NEW LOW 30 MIN: CPT | Performed by: SURGERY

## 2024-05-08 PROCEDURE — 3017F COLORECTAL CA SCREEN DOC REV: CPT | Performed by: SURGERY

## 2024-05-08 PROCEDURE — 1036F TOBACCO NON-USER: CPT | Performed by: SURGERY

## 2024-05-08 PROCEDURE — G8427 DOCREV CUR MEDS BY ELIG CLIN: HCPCS | Performed by: SURGERY

## 2024-05-08 PROCEDURE — G8420 CALC BMI NORM PARAMETERS: HCPCS | Performed by: SURGERY

## 2024-05-08 RX ORDER — SODIUM CHLORIDE 0.9 % (FLUSH) 0.9 %
5-40 SYRINGE (ML) INJECTION EVERY 12 HOURS SCHEDULED
Status: CANCELLED | OUTPATIENT
Start: 2024-05-08

## 2024-05-08 RX ORDER — SODIUM CHLORIDE 9 MG/ML
INJECTION, SOLUTION INTRAVENOUS CONTINUOUS
Status: CANCELLED | OUTPATIENT
Start: 2024-05-08

## 2024-05-08 RX ORDER — SODIUM CHLORIDE 0.9 % (FLUSH) 0.9 %
5-40 SYRINGE (ML) INJECTION PRN
Status: CANCELLED | OUTPATIENT
Start: 2024-05-08

## 2024-05-08 RX ORDER — SODIUM CHLORIDE 9 MG/ML
INJECTION, SOLUTION INTRAVENOUS PRN
Status: CANCELLED | OUTPATIENT
Start: 2024-05-08

## 2024-05-08 NOTE — PROGRESS NOTES
SUBJECTIVE:  HPI:     Patient referred here for Mediport placement with recent Esophageal adenocarcinoma and left lung carcinoid tumor.  Currently having some difficulties with getting peripheral IVs.  She is not on any anticoagulation or antiplatelet therapy.    Past Surgical History:   Procedure Laterality Date    ASD REPAIR      1969 repaired hole in heart    TONSILLECTOMY      age 8    WRIST FRACTURE SURGERY Left 2021    LEFT WRIST OPEN REDUCTION INTERNAL FIXATION performed by Guido Walsh DO at [facility] OR     Past Medical History:   Diagnosis Date    Esophageal adenocarcinoma (HCC) 2024    History of blood transfusion          Family History   Problem Relation Age of Onset    Stroke Mother     High Blood Pressure Mother     Cancer Father         Lung CA    Other Brother         Stomach issues     Social History     Socioeconomic History    Marital status:      Spouse name: Not on file    Number of children: Not on file    Years of education: Not on file    Highest education level: Not on file   Occupational History    Not on file   Tobacco Use    Smoking status: Former     Current packs/day: 0.00     Average packs/day: 2.0 packs/day for 35.0 years (70.0 ttl pk-yrs)     Types: Cigarettes     Quit date: 2024     Years since quittin.2    Smokeless tobacco: Never   Vaping Use    Vaping Use: Never used   Substance and Sexual Activity    Alcohol use: Not Currently    Drug use: Not Currently    Sexual activity: Not Currently   Other Topics Concern    Not on file   Social History Narrative    Not on file     Social Determinants of Health     Financial Resource Strain: Not on file   Food Insecurity: Not on file   Transportation Needs: Not on file   Physical Activity: Not on file   Stress: Not on file   Social Connections: Not on file   Intimate Partner Violence: Not on file   Housing Stability: Not on file       Current Outpatient Medications   Medication Sig Dispense Refill    sennosides

## 2024-05-08 NOTE — H&P (VIEW-ONLY)
SUBJECTIVE:  HPI:     Patient referred here for Mediport placement with recent Esophageal adenocarcinoma and left lung carcinoid tumor.  Currently having some difficulties with getting peripheral IVs.  She is not on any anticoagulation or antiplatelet therapy.    Past Surgical History:   Procedure Laterality Date    ASD REPAIR      1969 repaired hole in heart    TONSILLECTOMY      age 8    WRIST FRACTURE SURGERY Left 2021    LEFT WRIST OPEN REDUCTION INTERNAL FIXATION performed by Guido Walsh DO at [facility] OR     Past Medical History:   Diagnosis Date    Esophageal adenocarcinoma (HCC) 2024    History of blood transfusion          Family History   Problem Relation Age of Onset    Stroke Mother     High Blood Pressure Mother     Cancer Father         Lung CA    Other Brother         Stomach issues     Social History     Socioeconomic History    Marital status:      Spouse name: Not on file    Number of children: Not on file    Years of education: Not on file    Highest education level: Not on file   Occupational History    Not on file   Tobacco Use    Smoking status: Former     Current packs/day: 0.00     Average packs/day: 2.0 packs/day for 35.0 years (70.0 ttl pk-yrs)     Types: Cigarettes     Quit date: 2024     Years since quittin.2    Smokeless tobacco: Never   Vaping Use    Vaping Use: Never used   Substance and Sexual Activity    Alcohol use: Not Currently    Drug use: Not Currently    Sexual activity: Not Currently   Other Topics Concern    Not on file   Social History Narrative    Not on file     Social Determinants of Health     Financial Resource Strain: Not on file   Food Insecurity: Not on file   Transportation Needs: Not on file   Physical Activity: Not on file   Stress: Not on file   Social Connections: Not on file   Intimate Partner Violence: Not on file   Housing Stability: Not on file       Current Outpatient Medications   Medication Sig Dispense Refill    sennosides

## 2024-05-09 ENCOUNTER — APPOINTMENT (OUTPATIENT)
Dept: GENERAL RADIOLOGY | Age: 59
End: 2024-05-09
Attending: SURGERY
Payer: MEDICAID

## 2024-05-09 ENCOUNTER — ANESTHESIA (OUTPATIENT)
Dept: OPERATING ROOM | Age: 59
End: 2024-05-09
Payer: MEDICAID

## 2024-05-09 ENCOUNTER — HOSPITAL ENCOUNTER (OUTPATIENT)
Age: 59
Setting detail: OUTPATIENT SURGERY
Discharge: HOME | End: 2024-05-09
Attending: SURGERY | Admitting: SURGERY
Payer: MEDICAID

## 2024-05-09 ENCOUNTER — ANESTHESIA EVENT (OUTPATIENT)
Dept: OPERATING ROOM | Age: 59
End: 2024-05-09
Payer: MEDICAID

## 2024-05-09 VITALS
RESPIRATION RATE: 16 BRPM | SYSTOLIC BLOOD PRESSURE: 112 MMHG | OXYGEN SATURATION: 95 % | TEMPERATURE: 97.6 F | DIASTOLIC BLOOD PRESSURE: 60 MMHG | HEART RATE: 95 BPM

## 2024-05-09 PROCEDURE — 2580000003 HC RX 258: Performed by: SURGERY

## 2024-05-09 PROCEDURE — 7100000011 HC PHASE II RECOVERY - ADDTL 15 MIN: Performed by: SURGERY

## 2024-05-09 PROCEDURE — 6360000002 HC RX W HCPCS: Performed by: SURGERY

## 2024-05-09 PROCEDURE — 76937 US GUIDE VASCULAR ACCESS: CPT | Performed by: SURGERY

## 2024-05-09 PROCEDURE — 76000 FLUOROSCOPY <1 HR PHYS/QHP: CPT

## 2024-05-09 PROCEDURE — 3600000013 HC SURGERY LEVEL 3 ADDTL 15MIN: Performed by: SURGERY

## 2024-05-09 PROCEDURE — 2500000003 HC RX 250 WO HCPCS: Performed by: SURGERY

## 2024-05-09 PROCEDURE — 7100000001 HC PACU RECOVERY - ADDTL 15 MIN: Performed by: SURGERY

## 2024-05-09 PROCEDURE — 7100000000 HC PACU RECOVERY - FIRST 15 MIN: Performed by: SURGERY

## 2024-05-09 PROCEDURE — 36561 INSERT TUNNELED CV CATH: CPT | Performed by: SURGERY

## 2024-05-09 PROCEDURE — 3700000000 HC ANESTHESIA ATTENDED CARE: Performed by: SURGERY

## 2024-05-09 PROCEDURE — 77001 FLUOROGUIDE FOR VEIN DEVICE: CPT | Performed by: SURGERY

## 2024-05-09 PROCEDURE — 3700000001 HC ADD 15 MINUTES (ANESTHESIA): Performed by: SURGERY

## 2024-05-09 PROCEDURE — 7100000010 HC PHASE II RECOVERY - FIRST 15 MIN: Performed by: SURGERY

## 2024-05-09 PROCEDURE — 6360000002 HC RX W HCPCS

## 2024-05-09 PROCEDURE — 71045 X-RAY EXAM CHEST 1 VIEW: CPT

## 2024-05-09 PROCEDURE — 3600000003 HC SURGERY LEVEL 3 BASE: Performed by: SURGERY

## 2024-05-09 PROCEDURE — 2709999900 HC NON-CHARGEABLE SUPPLY: Performed by: SURGERY

## 2024-05-09 PROCEDURE — C1788 PORT, INDWELLING, IMP: HCPCS | Performed by: SURGERY

## 2024-05-09 DEVICE — PORT INFUS SLIM W/ 8FR ATTCH POLYUR SGL LUMN VEN CATH LO: Type: IMPLANTABLE DEVICE | Site: CHEST | Status: FUNCTIONAL

## 2024-05-09 RX ORDER — FENTANYL CITRATE 50 UG/ML
INJECTION, SOLUTION INTRAMUSCULAR; INTRAVENOUS PRN
Status: DISCONTINUED | OUTPATIENT
Start: 2024-05-09 | End: 2024-05-09 | Stop reason: SDUPTHER

## 2024-05-09 RX ORDER — HEPARIN 100 UNIT/ML
SYRINGE INTRAVENOUS
Status: COMPLETED | OUTPATIENT
Start: 2024-05-09 | End: 2024-05-09

## 2024-05-09 RX ORDER — PHENYLEPHRINE HCL IN 0.9% NACL 1 MG/10 ML
SYRINGE (ML) INTRAVENOUS PRN
Status: DISCONTINUED | OUTPATIENT
Start: 2024-05-09 | End: 2024-05-09 | Stop reason: SDUPTHER

## 2024-05-09 RX ORDER — SODIUM CHLORIDE 9 MG/ML
INJECTION, SOLUTION INTRAVENOUS PRN
Status: DISCONTINUED | OUTPATIENT
Start: 2024-05-09 | End: 2024-05-09 | Stop reason: HOSPADM

## 2024-05-09 RX ORDER — ONDANSETRON 2 MG/ML
INJECTION INTRAMUSCULAR; INTRAVENOUS PRN
Status: DISCONTINUED | OUTPATIENT
Start: 2024-05-09 | End: 2024-05-09 | Stop reason: SDUPTHER

## 2024-05-09 RX ORDER — SODIUM CHLORIDE 9 MG/ML
INJECTION, SOLUTION INTRAVENOUS CONTINUOUS
Status: DISCONTINUED | OUTPATIENT
Start: 2024-05-09 | End: 2024-05-09 | Stop reason: HOSPADM

## 2024-05-09 RX ORDER — HYDRALAZINE HYDROCHLORIDE 20 MG/ML
10 INJECTION INTRAMUSCULAR; INTRAVENOUS
Status: DISCONTINUED | OUTPATIENT
Start: 2024-05-09 | End: 2024-05-09 | Stop reason: HOSPADM

## 2024-05-09 RX ORDER — SODIUM CHLORIDE 0.9 % (FLUSH) 0.9 %
5-40 SYRINGE (ML) INJECTION EVERY 12 HOURS SCHEDULED
Status: DISCONTINUED | OUTPATIENT
Start: 2024-05-09 | End: 2024-05-09 | Stop reason: HOSPADM

## 2024-05-09 RX ORDER — FENTANYL CITRATE 50 UG/ML
25 INJECTION, SOLUTION INTRAMUSCULAR; INTRAVENOUS EVERY 5 MIN PRN
Status: DISCONTINUED | OUTPATIENT
Start: 2024-05-09 | End: 2024-05-09 | Stop reason: HOSPADM

## 2024-05-09 RX ORDER — SODIUM CHLORIDE 0.9 % (FLUSH) 0.9 %
5-40 SYRINGE (ML) INJECTION PRN
Status: DISCONTINUED | OUTPATIENT
Start: 2024-05-09 | End: 2024-05-09 | Stop reason: HOSPADM

## 2024-05-09 RX ORDER — NALOXONE HYDROCHLORIDE 0.4 MG/ML
INJECTION, SOLUTION INTRAMUSCULAR; INTRAVENOUS; SUBCUTANEOUS PRN
Status: DISCONTINUED | OUTPATIENT
Start: 2024-05-09 | End: 2024-05-09 | Stop reason: HOSPADM

## 2024-05-09 RX ORDER — PROPOFOL 10 MG/ML
INJECTION, EMULSION INTRAVENOUS PRN
Status: DISCONTINUED | OUTPATIENT
Start: 2024-05-09 | End: 2024-05-09 | Stop reason: SDUPTHER

## 2024-05-09 RX ORDER — DEXAMETHASONE SODIUM PHOSPHATE 4 MG/ML
INJECTION, SOLUTION INTRA-ARTICULAR; INTRALESIONAL; INTRAMUSCULAR; INTRAVENOUS; SOFT TISSUE PRN
Status: DISCONTINUED | OUTPATIENT
Start: 2024-05-09 | End: 2024-05-09 | Stop reason: SDUPTHER

## 2024-05-09 RX ORDER — LIDOCAINE HYDROCHLORIDE AND EPINEPHRINE 10; 10 MG/ML; UG/ML
INJECTION, SOLUTION INFILTRATION; PERINEURAL
Status: COMPLETED | OUTPATIENT
Start: 2024-05-09 | End: 2024-05-09

## 2024-05-09 RX ORDER — LIDOCAINE HYDROCHLORIDE 20 MG/ML
INJECTION, SOLUTION INTRAVENOUS PRN
Status: DISCONTINUED | OUTPATIENT
Start: 2024-05-09 | End: 2024-05-09 | Stop reason: SDUPTHER

## 2024-05-09 RX ORDER — ONDANSETRON 2 MG/ML
4 INJECTION INTRAMUSCULAR; INTRAVENOUS
Status: DISCONTINUED | OUTPATIENT
Start: 2024-05-09 | End: 2024-05-09 | Stop reason: HOSPADM

## 2024-05-09 RX ORDER — LABETALOL HYDROCHLORIDE 5 MG/ML
10 INJECTION, SOLUTION INTRAVENOUS
Status: DISCONTINUED | OUTPATIENT
Start: 2024-05-09 | End: 2024-05-09 | Stop reason: HOSPADM

## 2024-05-09 RX ORDER — FENTANYL CITRATE 50 UG/ML
50 INJECTION, SOLUTION INTRAMUSCULAR; INTRAVENOUS EVERY 5 MIN PRN
Status: DISCONTINUED | OUTPATIENT
Start: 2024-05-09 | End: 2024-05-09 | Stop reason: HOSPADM

## 2024-05-09 RX ADMIN — ONDANSETRON 4 MG: 2 INJECTION INTRAMUSCULAR; INTRAVENOUS at 15:53

## 2024-05-09 RX ADMIN — PROPOFOL 160 MG: 10 INJECTION, EMULSION INTRAVENOUS at 15:50

## 2024-05-09 RX ADMIN — Medication 0.1 MG: at 15:59

## 2024-05-09 RX ADMIN — Medication 0.1 MG: at 15:56

## 2024-05-09 RX ADMIN — CEFAZOLIN 2000 MG: 2 INJECTION, POWDER, FOR SOLUTION INTRAMUSCULAR; INTRAVENOUS at 15:53

## 2024-05-09 RX ADMIN — FENTANYL CITRATE 50 MCG: 50 INJECTION, SOLUTION INTRAMUSCULAR; INTRAVENOUS at 15:54

## 2024-05-09 RX ADMIN — SODIUM CHLORIDE: 9 INJECTION, SOLUTION INTRAVENOUS at 15:38

## 2024-05-09 RX ADMIN — DEXAMETHASONE SODIUM PHOSPHATE 4 MG: 4 INJECTION, SOLUTION INTRA-ARTICULAR; INTRALESIONAL; INTRAMUSCULAR; INTRAVENOUS; SOFT TISSUE at 15:53

## 2024-05-09 RX ADMIN — LIDOCAINE HYDROCHLORIDE 60 MG: 20 INJECTION, SOLUTION INTRAVENOUS at 15:50

## 2024-05-09 RX ADMIN — FENTANYL CITRATE 50 MCG: 50 INJECTION, SOLUTION INTRAMUSCULAR; INTRAVENOUS at 15:50

## 2024-05-09 ASSESSMENT — PAIN SCALES - GENERAL
PAINLEVEL_OUTOF10: 0
PAINLEVEL_OUTOF10: 0

## 2024-05-09 NOTE — DISCHARGE INSTRUCTIONS
Patient Discharge Instructions  Dr. Everett Cali  [address]  [address]  [place] [address]  [phone]      Discharge Date:  5/9/2024    Discharged To:  Home      RESUME ACTIVITY:      BATHING:   OK to shower but no bath tub or submerging incision under water.    Incisions:    You have glue over your incisions, which will come off on its own in 1-2 weeks.  Keep wound dry and clean.  May shower as instructed above.    SPECIAL INSTRUCTIONS:     If you use a CPAP at home, continue to use it as normal.    Call the office at [phone]  if you have a fever greater than or equal to 101 F or if your incision becomes red, tender, or has drainage of pus.      If follow up appointment was not given to you, call the Surgical Clinic at [phone] for follow up appointment with Dr. Cali in:  1-2 weeks.          [facility]  [phone]    Do not drive, work around machines or use equipment.  Do not drink any alcoholic beverages.  Do not smoke while alone.  Avoid making important decisions.  Plan to spend a quiet, relaxed evening @ home.  Resume normal activities as you begin to feel better.  Eat lightly for your first meal, then gradually increase your diet to what is normal for you.  In case of nausea, avoid food and drink only clear liquids.  Resume food as nausea ceases.  Notify your surgeon if you experience fever, chills, large amount of bleeding, difficulty breathing, persistent nausea and vomiting or any other disturbing problem.  Call for a follow-up appointment with your surgeon.

## 2024-05-09 NOTE — PROGRESS NOTES
1628: pt arrived in PACU. Monitors applied and alarms on. Report from Zehra GUPTA and Amanda DAMON. X 2 incision sites present, closed with glue. No drainage noted.  1645: xray at bedside  1650: pt A & O x 4, denies pain at this time. Ice chips given.    Problem: Self Care Deficits Care Plan (Adult)  Goal: *Acute Goals and Plan of Care (Insert Text)  Description:   FUNCTIONAL STATUS PRIOR TO ADMISSION: Pt reports living with wife in single story home, reporting [place] at quad cane. Has tub-shower combo with no seated surface. Retired principal.    HOME SUPPORT: The patient lived with wife but did not require assist.    Occupational Therapy Goals  Initiated 7/17/2020  1. Patient will perform grooming with modified independence within 7 day(s). 2.  Patient will perform lower body dressing with supervision/set-up within 7 day(s). 3.  Patient will perform bathing with supervision/set-up within 7 day(s). 4.  Patient will perform toilet transfers with modified independence within 7 day(s). 5.  Patient will perform all aspects of toileting with modified independence within 7 day(s). 6.  Patient will utilize energy conservation techniques during functional activities with Min verbal cues within 7 day(s). Outcome: Progressing Towards Goal    OCCUPATIONAL THERAPY TREATMENT  Patient: oTry Lassiter (61 y.o. male)  Date: 7/24/2020  Diagnosis: DVT (deep vein thrombosis) in pregnancy [O22.30]  DVT (deep venous thrombosis) ([facility]) [I82.409]   DVT (deep venous thrombosis) ([facility])  Procedure(s) (LRB):  ESOPHAGOGASTRODUODENOSCOPY (EGD) at bedside (N/A) 4 Days Post-Op  Precautions: Fall  Chart, occupational therapy assessment, plan of care, and goals were reviewed. ASSESSMENT  Patient continues with skilled OT services and is progressing towards goals. Pt progressed and with grooming while sitting EOB. Pt with good activity tolerance for sitting EOB and MAP stable above 60. Nursing provided medication with witting unsupported EOBN. Pt transferred to w/c with min A using RW and cues. Pt being transferred to 4W at this time and aborted further intervention.   Recommend discharge to rehab facility as he is not yet demonstrating ability to return home at his current level of function. Current Level of Function Impacting Discharge (ADLs):  min A    Other factors to consider for discharge: hypotension         PLAN :  Patient continues to benefit from skilled intervention to address the above impairments. Continue treatment per established plan of care. to address goals. Recommend with staff: OOB to chair TID for meals. Monitor BP/MAp    Recommend next OT session: toilet transfer    Recommendation for discharge: (in order for the patient to meet his/her long term goals)  Therapy up to 5 days/week in SNF setting    This discharge recommendation:  Has been made in collaboration with the attending provider and/or case management    IF patient discharges home will need the following DME: none       SUBJECTIVE:   Patient stated I am feeling alright.     OBJECTIVE DATA SUMMARY:   Cognitive/Behavioral Status:  Neurologic State: Alert  Orientation Level: Oriented X4  Cognition: Appropriate for age attention/concentration  Perception: Appears intact  Perseveration: No perseveration noted  Safety/Judgement: Good awareness of safety precautions    Functional Mobility and Transfers for ADLs:  Bed Mobility:  Supine to Sit: Additional time;Minimum assistance  Sit to Supine: (remained inw/c with nursing to transfer to 4W)    Transfers:  Sit to Stand: Additional time;Minimum assistance     Bed to Chair: Additional time;Minimum assistance    Balance:  Sitting: Intact  Standing: Intact; With support    ADL Intervention:       Grooming  Grooming Assistance: Supervision  Position Performed: Seated edge of bed  Washing Face: Supervision  Washing Hands: Supervision  Brushing Teeth: Supervision                        Toileting  Toileting Assistance: Minimum assistance  Bladder Hygiene: Minimum assistance  Clothing Management: Minimum assistance    Cognitive Retraining  Safety/Judgement: Good awareness of safety precautions      Pain:  No pain    Activity Tolerance:   Good  Please refer to the flowsheet for vital signs taken during this treatment. After treatment patient left in no apparent distress:   Sitting in chair and Call bell within reach    COMMUNICATION/COLLABORATION:   The patients plan of care was discussed with: Physical therapist and Registered nurse.      Emilia Washington OT  Time Calculation: 10 mins

## 2024-05-09 NOTE — PROGRESS NOTES
1704 Pt. Brought back to unit, bedside report received from ALONSO Bang. Pt. Surgical site is C/D/I. Pt provided with beverage. Call light in reach.  1708 Pt. VSS.   1726 Spoke to [place] radiologist, per Dr. Kovacs (radiology). Per Dr. Kovacs, no pneumothorax.   1735 DC instructions reviewed with pt and family member, all parties express understanding.   1743 Pt. To DC home with spouse.

## 2024-05-09 NOTE — INTERVAL H&P NOTE
Update History & Physical    The patient's History and Physical of May 8, 24 was reviewed with the patient and I examined the patient. There was no change. The surgical site was confirmed by the patient and me.     Plan: The risks, benefits, expected outcome, and alternative to the recommended procedure have been discussed with the patient. Patient understands and wants to proceed with the procedure.     Electronically signed by Everett Cali DO on 5/9/2024 at 3:27 PM

## 2024-05-09 NOTE — ANESTHESIA PRE PROCEDURE
Department of Anesthesiology  Preprocedure Note       Name:  Denisa Basurto   Age:  58 y.o.  :  1965                                          MRN:  [id number]         Date:  2024      Surgeon: Surgeon(s):  Everett Cali DO    Procedure: Procedure(s):  PORT INSERTION    Medications prior to admission:   Prior to Admission medications    Medication Sig Start Date End Date Taking? Authorizing Provider   sennosides (SENOKOT) 8.8 MG/5ML syrup Take 5 mLs by mouth 3 times daily 3/19/24   Yun Hoffmann MD   nicotine (NICODERM CQ) 7 MG/24HR Place 1 patch onto the skin daily 24  Anthony Guthrie MD   oxyCODONE (ROXICODONE INTENSOL) 100 MG/5ML concentrated solution Take 0.25 mLs by mouth every 4 hours as needed for Pain for up to 30 days. Pt can take this Q 6 hrs Max Daily Amount: 30 mg 24  Anthony Guthrie MD   hydrOXYzine HCl (ATARAX) 25 MG tablet Take 1 tablet by mouth every 6 hours as needed for Itching 3/20/24   Anthony Guthrie MD   nicotine (NICODERM CQ) 14 MG/24HR Place 1 patch onto the skin daily  Patient not taking: Reported on 2024 3/19/24 4/18/24  Anthony Guthrie MD   famotidine (PEPCID) 40 MG/5ML suspension Take 2.5 mLs by mouth 2 times daily 3/19/24 5/8/24  Anthony Guthrie MD   ondansetron (ZOFRAN-ODT) 8 MG TBDP disintegrating tablet Place 1 tablet under the tongue every 8 hours as needed for Nausea or Vomiting 24   Anthony Guthrie MD   omeprazole (PRILOSEC) 2 MG/ML SUSP 2 mg/mL oral suspension 20 mLs by Per G Tube route 2 times daily (before meals) 24   Anthony Guthrie MD   nicotine (NICODERM CQ) 21 MG/24HR Place 1 patch onto the skin every 24 hours  Patient not taking: Reported on 2024    Yun Hoffmann MD       Current medications:    Current Facility-Administered Medications   Medication Dose Route Frequency Provider Last Rate Last Admin   • 0.9 % sodium chloride infusion   IntraVENous Continuous Everett Cali, DO       • sodium chloride flush 0.9 %  2000                        Time of last solid consumption: 2000                        Date of last liquid consumption: 05/08/24                        Date of last solid food consumption: 05/08/24    BMI:   Wt Readings from Last 3 Encounters:   05/08/24 68.7 kg (151 lb 8 oz)   04/30/24 67.4 kg (148 lb 9.6 oz)   04/09/24 67.7 kg (149 lb 3.2 oz)     There is no height or weight on file to calculate BMI.    CBC:   Lab Results   Component Value Date/Time    WBC 5.0 04/29/2024 08:16 AM    RBC 4.21 04/29/2024 08:16 AM    HGB 12.2 04/29/2024 08:16 AM    HCT 37.6 04/29/2024 08:16 AM    MCV 89.3 04/29/2024 08:16 AM    RDW 13.9 04/29/2024 08:16 AM     04/29/2024 08:16 AM       CMP:   Lab Results   Component Value Date/Time     04/29/2024 08:16 AM    K 4.3 04/29/2024 08:16 AM     04/29/2024 08:16 AM    CO2 27 04/29/2024 08:16 AM    BUN 16 04/29/2024 08:16 AM    CREATININE 0.8 04/29/2024 08:16 AM    LABGLOM 85 04/29/2024 08:16 AM    GLUCOSE 93 04/29/2024 08:16 AM    CALCIUM 8.4 04/29/2024 08:16 AM    BILITOT 0.2 04/29/2024 08:16 AM    ALKPHOS 78 04/29/2024 08:16 AM    AST 16 04/29/2024 08:16 AM    ALT 11 04/29/2024 08:16 AM       POC Tests: No results for input(s): \"POCGLU\", \"POCNA\", \"POCK\", \"POCCL\", \"POCBUN\", \"POCHEMO\", \"POCHCT\" in the last 72 hours.    Coags: No results found for: \"PROTIME\", \"INR\", \"APTT\"    HCG (If Applicable): No results found for: \"PREGTESTUR\", \"PREGSERUM\", \"HCG\", \"HCGQUANT\"     ABGs: No results found for: \"PHART\", \"PO2ART\", \"VPC8ZOT\", \"GXM7XIO\", \"BEART\", \"I2RVAOZL\"     Type & Screen (If Applicable):  No results found for: \"LABABO\"    Drug/Infectious Status (If Applicable):  No results found for: \"HIV\", \"HEPCAB\"    COVID-19 Screening (If Applicable):   Lab Results   Component Value Date/Time    COVID19 NOT DETECTED 02/16/2021 09:45 AM    COVID19 NOT DETECTED 02/15/2021 01:59 PM           Anesthesia Evaluation  Patient summary reviewed  Airway: Mallampati: II          Dental:

## 2024-05-09 NOTE — OP NOTE
Operative Note      Patient: Denisa Basurto  YOB: 1965  MRN: [id number]    Date of Procedure: 5/9/2024    Pre-Op Diagnosis Codes:     * Esophageal cancer (HCC) [C15.9]    Post-Op Diagnosis: Same       Procedure(s):  PORT INSERTION    Surgeon(s):  Everett Cali DO    Assistant:   * No surgical staff found *    Anesthesia: Monitor Anesthesia Care    Estimated Blood Loss (mL): Minimal    Complications: None    Specimens:   * No specimens in log *    Implants:  Implant Name Type Inv. Item Serial No.  Lot No. LRB No. Used Action   PORT INFUS SLIM W/ 8FR ATTCH POLYUR SGL LUMN MADELYN CATH LO - [id number]  PORT INFUS SLIM W/ 8FR ATTCH POLYUR SGL LUMN MADELYN CATH LO  [employer] [id number] 1 Implanted         Drains: * No LDAs found *    Findings:  Infection Present At Time Of Surgery (PATOS) (choose all levels that have infection present):  No infection present  Other Findings: RIJ [facility]      Detailed Description of Procedure:     After sedation, patient was prepped and draped in usual sterile fashion.  After injecting local anesthetic, right IJ was accessed with needle with the use of ultrasound and wire advanced under fluoroscopy into the IVC.  Needle removed.  Area for pocket and tract was anesthetized and incision created for pocket and at wire.  A pocket was bluntly dissected and the port placed into the pocket and the catheter tunneled up to the wire.  Under fluoroscopy a dilator with breakaway sheath was advanced over the wire and then the dilator and wire were removed leaving the breakaway sheath in place.  The catheter was cut to appropriate length under fluoroscopy and advanced into the breakaway sheath which was then broken and removed leaving the catheter in place.  Catheter was confirmed to be in good position under fluoroscopy.  The port was accessed with Mao needle and withdrew easily and was flushed with heparinized saline.  Incisions were closed with

## 2024-05-14 NOTE — ANESTHESIA POSTPROCEDURE EVALUATION
Department of Anesthesiology  Postprocedure Note    Patient: Denisa Basurto  MRN: [id number]  YOB: 1965  Date of evaluation: 5/14/2024    Procedure Summary       Date: 05/09/24 Room / Location: [address]    Anesthesia Start: 1538 Anesthesia Stop: 1632    Procedure: PORT INSERTION (Chest) Diagnosis:       Esophageal cancer (HCC)      (Esophageal cancer (HCC) [C15.9])    Surgeons: Everett Cali DO Responsible Provider: Evan Dennis MD    Anesthesia Type: MAC, general ASA Status: 3 - Emergent            Anesthesia Type: No value filed.    Chandni Phase I: Chandni Score: 10    Chandni Phase II: Chandni Score: 10    Anesthesia Post Evaluation    Patient location during evaluation: PACU  Patient participation: complete - patient participated  Level of consciousness: sleepy but conscious  Pain score: 1  Airway patency: patent  Nausea & Vomiting: no nausea and no vomiting  Cardiovascular status: hemodynamically stable  Respiratory status: acceptable  Hydration status: euvolemic  Pain management: adequate    No notable events documented.

## 2024-05-17 DIAGNOSIS — Z11.59 SCREENING FOR VIRAL DISEASE: ICD-10-CM

## 2024-05-17 DIAGNOSIS — C15.9 ESOPHAGEAL ADENOCARCINOMA (HCC): Primary | ICD-10-CM

## 2024-05-17 DIAGNOSIS — Z79.899 ENCOUNTER FOR LONG-TERM (CURRENT) USE OF MEDICATIONS: ICD-10-CM

## 2024-05-19 NOTE — PROGRESS NOTES
Patient Name:  Denisa Basurto  Patient :  1965  Patient MRN:  [id number]     Primary Oncologist: Anthony Guthrie MD  Referring Provider: Osbaldo Walton APRN - NP     Date of Service: 2024      Chief Complaint:    Chief Complaint   Patient presents with    Follow-up    Chemotherapy     Patient Active Problem List:     Closed fracture of left distal radius and ulna     Esophageal adenocarcinoma (HCC)    HPI:   Denisa Basurto is a 58 y.o. female with no pertinent medical history referred to me on 24 for evaluation of esophageal adenocarcinoma.     She initially presented to ED with nausea vomiting and poor oral intake.  She is having stomach issues since 10, 2023. She has been unable to keep solids down and has about 40 pound weight loss.     CT abdomen and pelvis done on 2023 showed a heterogeneous solid mass in the gastric cardia extending to the gastroesophageal junction measuring about 6.6 x 6.8 x 7.7 cm.  CT chest done 2023 showed a 2.4 cm noncalcified pulmonary nodule within the left upper lobe as well as an additional 1.6 cm nodule in the left upper lobe.     Dr. Martinez attempted an EGD on 2023 and found obstructing distal esophageal mass, which is ulcerated and necrotic. He couldn't able to pass a pediatric scope beyond the mass.  Biopsy was done and pathology revealed moderately differentiated adenocarcinoma. Surgical oncology placed a feeding tube on 2024.     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     She was seen by CT surgeon, Dr. Figueroa at [facility] on 24. He scheduled for CT guided biopsy of second lung nodule and it is scheduled to be done on 24.     I requested MSI which came back unstable. PD-L1 for Keytruda - expressed (CPS >10) and Her2 negative (score 1+).     PET/CT scan on 24 showed Intensely hypermetabolic

## 2024-05-20 ENCOUNTER — HOSPITAL ENCOUNTER (OUTPATIENT)
Dept: INFUSION THERAPY | Age: 59
Discharge: HOME | End: 2024-05-20
Payer: MEDICAID

## 2024-05-20 DIAGNOSIS — Z11.59 SCREENING FOR VIRAL DISEASE: ICD-10-CM

## 2024-05-20 DIAGNOSIS — C15.9 ESOPHAGEAL ADENOCARCINOMA (HCC): ICD-10-CM

## 2024-05-20 DIAGNOSIS — Z79.899 ENCOUNTER FOR LONG-TERM (CURRENT) USE OF MEDICATIONS: ICD-10-CM

## 2024-05-20 LAB
ALBUMIN SERPL-MCNC: 3.5 GM/DL (ref 3.4–5)
ALP BLD-CCNC: 75 IU/L (ref 40–129)
ALT SERPL-CCNC: 10 U/L (ref 10–40)
ANION GAP SERPL CALCULATED.3IONS-SCNC: 8 MMOL/L (ref 7–16)
AST SERPL-CCNC: 15 IU/L (ref 15–37)
BASOPHILS ABSOLUTE: 0 K/CU MM
BASOPHILS RELATIVE PERCENT: 0.2 % (ref 0–1)
BILIRUB SERPL-MCNC: 0.2 MG/DL (ref 0–1)
BUN SERPL-MCNC: 16 MG/DL (ref 6–23)
CALCIUM SERPL-MCNC: 8.7 MG/DL (ref 8.3–10.6)
CHLORIDE BLD-SCNC: 102 MMOL/L (ref 99–110)
CO2: 30 MMOL/L (ref 21–32)
CREAT SERPL-MCNC: 0.9 MG/DL (ref 0.6–1.1)
DIFFERENTIAL TYPE: ABNORMAL
EOSINOPHILS ABSOLUTE: 0.1 K/CU MM
EOSINOPHILS RELATIVE PERCENT: 2.4 % (ref 0–3)
GFR, ESTIMATED: 74 ML/MIN/1.73M2
GLUCOSE SERPL-MCNC: 87 MG/DL (ref 70–99)
HCT VFR BLD CALC: 38 % (ref 37–47)
HEMOGLOBIN: 11.8 GM/DL (ref 12.5–16)
LYMPHOCYTES ABSOLUTE: 1.1 K/CU MM
LYMPHOCYTES RELATIVE PERCENT: 20.1 % (ref 24–44)
MCH RBC QN AUTO: 28.3 PG (ref 27–31)
MCHC RBC AUTO-ENTMCNC: 31.1 % (ref 32–36)
MCV RBC AUTO: 91.1 FL (ref 78–100)
MONOCYTES ABSOLUTE: 0.7 K/CU MM
MONOCYTES RELATIVE PERCENT: 12.3 % (ref 0–4)
NEUTROPHILS ABSOLUTE: 3.5 K/CU MM
NEUTROPHILS RELATIVE PERCENT: 65 % (ref 36–66)
PDW BLD-RTO: 14.9 % (ref 11.7–14.9)
PLATELET # BLD: 223 K/CU MM (ref 140–440)
PMV BLD AUTO: 9.6 FL (ref 7.5–11.1)
POTASSIUM SERPL-SCNC: 4.1 MMOL/L (ref 3.5–5.1)
RBC # BLD: 4.17 M/CU MM (ref 4.2–5.4)
SODIUM BLD-SCNC: 140 MMOL/L (ref 135–145)
TOTAL PROTEIN: 6.3 GM/DL (ref 6.4–8.2)
WBC # BLD: 5.4 K/CU MM (ref 4–10.5)

## 2024-05-20 PROCEDURE — 36415 COLL VENOUS BLD VENIPUNCTURE: CPT

## 2024-05-20 PROCEDURE — 85025 COMPLETE CBC W/AUTO DIFF WBC: CPT

## 2024-05-20 PROCEDURE — 80053 COMPREHEN METABOLIC PANEL: CPT

## 2024-05-21 ENCOUNTER — OFFICE VISIT (OUTPATIENT)
Dept: ONCOLOGY | Age: 59
End: 2024-05-21
Payer: MEDICAID

## 2024-05-21 ENCOUNTER — HOSPITAL ENCOUNTER (OUTPATIENT)
Dept: INFUSION THERAPY | Age: 59
Discharge: HOME | End: 2024-05-21
Payer: MEDICAID

## 2024-05-21 VITALS
RESPIRATION RATE: 16 BRPM | OXYGEN SATURATION: 95 % | WEIGHT: 153 LBS | HEIGHT: 68 IN | HEART RATE: 80 BPM | DIASTOLIC BLOOD PRESSURE: 57 MMHG | BODY MASS INDEX: 23.19 KG/M2 | SYSTOLIC BLOOD PRESSURE: 115 MMHG | TEMPERATURE: 98 F

## 2024-05-21 VITALS
RESPIRATION RATE: 16 BRPM | HEIGHT: 68 IN | WEIGHT: 153.8 LBS | OXYGEN SATURATION: 95 % | DIASTOLIC BLOOD PRESSURE: 57 MMHG | TEMPERATURE: 98 F | SYSTOLIC BLOOD PRESSURE: 115 MMHG | BODY MASS INDEX: 23.31 KG/M2 | HEART RATE: 80 BPM

## 2024-05-21 DIAGNOSIS — C15.9 MALIGNANT NEOPLASM OF ESOPHAGUS, UNSPECIFIED LOCATION (HCC): ICD-10-CM

## 2024-05-21 DIAGNOSIS — Z11.59 SCREENING FOR VIRAL DISEASE: ICD-10-CM

## 2024-05-21 DIAGNOSIS — C15.9 MALIGNANT NEOPLASM OF ESOPHAGUS, UNSPECIFIED LOCATION (HCC): Primary | ICD-10-CM

## 2024-05-21 DIAGNOSIS — Z79.899 ENCOUNTER FOR LONG-TERM (CURRENT) USE OF MEDICATIONS: Primary | ICD-10-CM

## 2024-05-21 DIAGNOSIS — C15.9 ESOPHAGEAL ADENOCARCINOMA (HCC): Primary | ICD-10-CM

## 2024-05-21 DIAGNOSIS — Z79.899 ENCOUNTER FOR LONG-TERM (CURRENT) USE OF MEDICATIONS: ICD-10-CM

## 2024-05-21 DIAGNOSIS — C15.9 ESOPHAGEAL ADENOCARCINOMA (HCC): ICD-10-CM

## 2024-05-21 LAB — TSH SERPL DL<=0.005 MIU/L-ACNC: 1.59 UIU/ML (ref 0.27–4.2)

## 2024-05-21 PROCEDURE — 1036F TOBACCO NON-USER: CPT | Performed by: INTERNAL MEDICINE

## 2024-05-21 PROCEDURE — 99214 OFFICE O/P EST MOD 30 MIN: CPT | Performed by: INTERNAL MEDICINE

## 2024-05-21 PROCEDURE — 84443 ASSAY THYROID STIM HORMONE: CPT

## 2024-05-21 PROCEDURE — G8427 DOCREV CUR MEDS BY ELIG CLIN: HCPCS | Performed by: INTERNAL MEDICINE

## 2024-05-21 PROCEDURE — 2580000003 HC RX 258: Performed by: INTERNAL MEDICINE

## 2024-05-21 PROCEDURE — 3017F COLORECTAL CA SCREEN DOC REV: CPT | Performed by: INTERNAL MEDICINE

## 2024-05-21 PROCEDURE — G8420 CALC BMI NORM PARAMETERS: HCPCS | Performed by: INTERNAL MEDICINE

## 2024-05-21 PROCEDURE — 6360000002 HC RX W HCPCS: Performed by: INTERNAL MEDICINE

## 2024-05-21 PROCEDURE — 96413 CHEMO IV INFUSION 1 HR: CPT

## 2024-05-21 RX ORDER — FAMOTIDINE 10 MG/ML
20 INJECTION, SOLUTION INTRAVENOUS
OUTPATIENT
Start: 2024-06-11

## 2024-05-21 RX ORDER — FAMOTIDINE 10 MG/ML
20 INJECTION, SOLUTION INTRAVENOUS
OUTPATIENT
Start: 2024-07-02

## 2024-05-21 RX ORDER — ALBUTEROL SULFATE 90 UG/1
4 AEROSOL, METERED RESPIRATORY (INHALATION) PRN
OUTPATIENT
Start: 2024-07-02

## 2024-05-21 RX ORDER — SODIUM CHLORIDE 9 MG/ML
INJECTION, SOLUTION INTRAVENOUS CONTINUOUS
OUTPATIENT
Start: 2024-07-02

## 2024-05-21 RX ORDER — ALBUTEROL SULFATE 90 UG/1
4 AEROSOL, METERED RESPIRATORY (INHALATION) PRN
OUTPATIENT
Start: 2024-06-11

## 2024-05-21 RX ORDER — SODIUM CHLORIDE 9 MG/ML
5-250 INJECTION, SOLUTION INTRAVENOUS PRN
Status: CANCELLED | OUTPATIENT
Start: 2024-05-21

## 2024-05-21 RX ORDER — SODIUM CHLORIDE 9 MG/ML
5-250 INJECTION, SOLUTION INTRAVENOUS PRN
OUTPATIENT
Start: 2024-06-11

## 2024-05-21 RX ORDER — ONDANSETRON 2 MG/ML
8 INJECTION INTRAMUSCULAR; INTRAVENOUS
OUTPATIENT
Start: 2024-06-11

## 2024-05-21 RX ORDER — ONDANSETRON 2 MG/ML
8 INJECTION INTRAMUSCULAR; INTRAVENOUS
OUTPATIENT
Start: 2024-07-02

## 2024-05-21 RX ORDER — ACETAMINOPHEN 325 MG/1
650 TABLET ORAL
OUTPATIENT
Start: 2024-06-11

## 2024-05-21 RX ORDER — SODIUM CHLORIDE 0.9 % (FLUSH) 0.9 %
5-40 SYRINGE (ML) INJECTION PRN
OUTPATIENT
Start: 2024-06-11

## 2024-05-21 RX ORDER — ALBUTEROL SULFATE 90 UG/1
4 AEROSOL, METERED RESPIRATORY (INHALATION) PRN
Status: CANCELLED | OUTPATIENT
Start: 2024-05-21

## 2024-05-21 RX ORDER — NAPROXEN 250 MG/1
250 TABLET ORAL 2 TIMES DAILY WITH MEALS
Qty: 180 TABLET | Refills: 1 | Status: SHIPPED | OUTPATIENT
Start: 2024-05-21

## 2024-05-21 RX ORDER — SODIUM CHLORIDE 9 MG/ML
5-250 INJECTION, SOLUTION INTRAVENOUS PRN
OUTPATIENT
Start: 2024-07-02

## 2024-05-21 RX ORDER — ONDANSETRON 2 MG/ML
8 INJECTION INTRAMUSCULAR; INTRAVENOUS
Status: CANCELLED | OUTPATIENT
Start: 2024-05-21

## 2024-05-21 RX ORDER — ACETAMINOPHEN 325 MG/1
650 TABLET ORAL
Status: CANCELLED | OUTPATIENT
Start: 2024-05-21

## 2024-05-21 RX ORDER — FAMOTIDINE 10 MG/ML
20 INJECTION, SOLUTION INTRAVENOUS
Status: CANCELLED | OUTPATIENT
Start: 2024-05-21

## 2024-05-21 RX ORDER — EPINEPHRINE 1 MG/ML
0.3 INJECTION, SOLUTION, CONCENTRATE INTRAVENOUS PRN
OUTPATIENT
Start: 2024-07-02

## 2024-05-21 RX ORDER — DIPHENHYDRAMINE HYDROCHLORIDE 50 MG/ML
50 INJECTION INTRAMUSCULAR; INTRAVENOUS
Status: CANCELLED | OUTPATIENT
Start: 2024-05-21

## 2024-05-21 RX ORDER — EPINEPHRINE 1 MG/ML
0.3 INJECTION, SOLUTION, CONCENTRATE INTRAVENOUS PRN
Status: CANCELLED | OUTPATIENT
Start: 2024-05-21

## 2024-05-21 RX ORDER — SODIUM CHLORIDE 0.9 % (FLUSH) 0.9 %
5-40 SYRINGE (ML) INJECTION PRN
OUTPATIENT
Start: 2024-07-02

## 2024-05-21 RX ORDER — HEPARIN SODIUM (PORCINE) LOCK FLUSH IV SOLN 100 UNIT/ML 100 UNIT/ML
500 SOLUTION INTRAVENOUS PRN
Status: CANCELLED | OUTPATIENT
Start: 2024-05-21

## 2024-05-21 RX ORDER — EPINEPHRINE 1 MG/ML
0.3 INJECTION, SOLUTION, CONCENTRATE INTRAVENOUS PRN
OUTPATIENT
Start: 2024-06-11

## 2024-05-21 RX ORDER — DIPHENHYDRAMINE HYDROCHLORIDE 50 MG/ML
50 INJECTION INTRAMUSCULAR; INTRAVENOUS
OUTPATIENT
Start: 2024-07-02

## 2024-05-21 RX ORDER — SODIUM CHLORIDE 0.9 % (FLUSH) 0.9 %
5-40 SYRINGE (ML) INJECTION PRN
Status: CANCELLED | OUTPATIENT
Start: 2024-05-21

## 2024-05-21 RX ORDER — MEPERIDINE HYDROCHLORIDE 50 MG/ML
12.5 INJECTION INTRAMUSCULAR; INTRAVENOUS; SUBCUTANEOUS PRN
OUTPATIENT
Start: 2024-06-11

## 2024-05-21 RX ORDER — SODIUM CHLORIDE 9 MG/ML
INJECTION, SOLUTION INTRAVENOUS CONTINUOUS
OUTPATIENT
Start: 2024-06-11

## 2024-05-21 RX ORDER — MEPERIDINE HYDROCHLORIDE 50 MG/ML
12.5 INJECTION INTRAMUSCULAR; INTRAVENOUS; SUBCUTANEOUS PRN
Status: CANCELLED | OUTPATIENT
Start: 2024-05-21

## 2024-05-21 RX ORDER — HEPARIN SODIUM (PORCINE) LOCK FLUSH IV SOLN 100 UNIT/ML 100 UNIT/ML
500 SOLUTION INTRAVENOUS PRN
OUTPATIENT
Start: 2024-07-02

## 2024-05-21 RX ORDER — ACETAMINOPHEN 325 MG/1
650 TABLET ORAL
OUTPATIENT
Start: 2024-07-02

## 2024-05-21 RX ORDER — DIPHENHYDRAMINE HYDROCHLORIDE 50 MG/ML
50 INJECTION INTRAMUSCULAR; INTRAVENOUS
OUTPATIENT
Start: 2024-06-11

## 2024-05-21 RX ORDER — SODIUM CHLORIDE 9 MG/ML
5-250 INJECTION, SOLUTION INTRAVENOUS PRN
Status: DISCONTINUED | OUTPATIENT
Start: 2024-05-21 | End: 2024-05-22 | Stop reason: HOSPADM

## 2024-05-21 RX ORDER — SODIUM CHLORIDE 9 MG/ML
INJECTION, SOLUTION INTRAVENOUS CONTINUOUS
Status: CANCELLED | OUTPATIENT
Start: 2024-05-21

## 2024-05-21 RX ORDER — SODIUM CHLORIDE 0.9 % (FLUSH) 0.9 %
5-40 SYRINGE (ML) INJECTION PRN
Status: DISCONTINUED | OUTPATIENT
Start: 2024-05-21 | End: 2024-05-22 | Stop reason: HOSPADM

## 2024-05-21 RX ORDER — MEPERIDINE HYDROCHLORIDE 50 MG/ML
12.5 INJECTION INTRAMUSCULAR; INTRAVENOUS; SUBCUTANEOUS PRN
OUTPATIENT
Start: 2024-07-02

## 2024-05-21 RX ORDER — HEPARIN SODIUM (PORCINE) LOCK FLUSH IV SOLN 100 UNIT/ML 100 UNIT/ML
500 SOLUTION INTRAVENOUS PRN
OUTPATIENT
Start: 2024-06-11

## 2024-05-21 RX ADMIN — SODIUM CHLORIDE 200 MG: 9 INJECTION, SOLUTION INTRAVENOUS at 10:05

## 2024-05-21 RX ADMIN — SODIUM CHLORIDE 25 ML/HR: 9 INJECTION, SOLUTION INTRAVENOUS at 09:39

## 2024-05-21 ASSESSMENT — PAIN SCALES - GENERAL: PAINLEVEL_OUTOF10: 0

## 2024-05-21 NOTE — PROGRESS NOTES
Status appropriately assessed and documented. All required labs and results reviewed. Treatment approved by provider. Treatment orders and medications verified by 2 Registered Nurses where applicable. Treatment plan was confirmed with patient prior to administration, and educated the need to report any treatment-related symptoms      Ambulated to infusion area, here today for chemotherapy. Had an OV with Dr. Guthrie, No concerns at this time. Right(new) chest mediport accessed, positive blood return noted. Labs reviewed, Labs within defined limits.  Chemo administered as ordered. Call light within reach. Tolerated infusion without incident.  Discharge instructions declined.

## 2024-05-21 NOTE — PROGRESS NOTES
MA Rooming Questions  Patient: Denisa Basurto  MRN: [id number]    Date: 5/21/2024        1. Do you have any new issues?   Yes- wants to discuss about PM meds         2. Do you need any refills on medications?    no    3. Have you had any imaging done since your last visit?   Port placed    4. Have you been hospitalized or seen in the emergency room since your last visit here?   no    5. Did the patient have a depression screening completed today? No    No data recorded     PHQ-9 Given to (if applicable):               PHQ-9 Score (if applicable):                     [] Positive     []  Negative              Does question #9 need addressed (if applicable)                     [] Yes    []  No               Jesenia Ahumada CMA

## 2024-06-10 ENCOUNTER — HOSPITAL ENCOUNTER (OUTPATIENT)
Dept: INFUSION THERAPY | Age: 59
Discharge: HOME | End: 2024-06-10
Payer: MEDICAID

## 2024-06-10 DIAGNOSIS — Z11.59 SCREENING FOR VIRAL DISEASE: ICD-10-CM

## 2024-06-10 DIAGNOSIS — Z79.899 ENCOUNTER FOR LONG-TERM (CURRENT) USE OF MEDICATIONS: ICD-10-CM

## 2024-06-10 DIAGNOSIS — C15.9 ESOPHAGEAL ADENOCARCINOMA (HCC): ICD-10-CM

## 2024-06-10 LAB
ALBUMIN SERPL-MCNC: 3.7 GM/DL (ref 3.4–5)
ALP BLD-CCNC: 73 IU/L (ref 40–128)
ALT SERPL-CCNC: 10 U/L (ref 10–40)
ANION GAP SERPL CALCULATED.3IONS-SCNC: 14 MMOL/L (ref 7–16)
AST SERPL-CCNC: 13 IU/L (ref 15–37)
BASOPHILS ABSOLUTE: 0 K/CU MM
BASOPHILS RELATIVE PERCENT: 0.2 % (ref 0–1)
BILIRUB SERPL-MCNC: 0.2 MG/DL (ref 0–1)
BUN SERPL-MCNC: 14 MG/DL (ref 6–23)
CALCIUM SERPL-MCNC: 8.8 MG/DL (ref 8.3–10.6)
CHLORIDE BLD-SCNC: 105 MMOL/L (ref 99–110)
CO2: 24 MMOL/L (ref 21–32)
CREAT SERPL-MCNC: 1 MG/DL (ref 0.6–1.1)
DIFFERENTIAL TYPE: ABNORMAL
EOSINOPHILS ABSOLUTE: 0.2 K/CU MM
EOSINOPHILS RELATIVE PERCENT: 4.2 % (ref 0–3)
GFR, ESTIMATED: 65 ML/MIN/1.73M2
GLUCOSE SERPL-MCNC: 77 MG/DL (ref 70–99)
HCT VFR BLD CALC: 37.5 % (ref 37–47)
HEMOGLOBIN: 12 GM/DL (ref 12.5–16)
LYMPHOCYTES ABSOLUTE: 0.9 K/CU MM
LYMPHOCYTES RELATIVE PERCENT: 17.7 % (ref 24–44)
MCH RBC QN AUTO: 28.1 PG (ref 27–31)
MCHC RBC AUTO-ENTMCNC: 32 % (ref 32–36)
MCV RBC AUTO: 87.8 FL (ref 78–100)
MONOCYTES ABSOLUTE: 0.5 K/CU MM
MONOCYTES RELATIVE PERCENT: 9.6 % (ref 0–4)
NEUTROPHILS ABSOLUTE: 3.6 K/CU MM
NEUTROPHILS RELATIVE PERCENT: 68.3 % (ref 36–66)
PDW BLD-RTO: 14.9 % (ref 11.7–14.9)
PLATELET # BLD: 339 K/CU MM (ref 140–440)
PMV BLD AUTO: 9.3 FL (ref 7.5–11.1)
POTASSIUM SERPL-SCNC: 4.2 MMOL/L (ref 3.5–5.1)
RBC # BLD: 4.27 M/CU MM (ref 4.2–5.4)
SODIUM BLD-SCNC: 143 MMOL/L (ref 135–145)
TOTAL PROTEIN: 7.4 GM/DL (ref 6.4–8.2)
TSH SERPL DL<=0.005 MIU/L-ACNC: 1.24 UIU/ML (ref 0.27–4.2)
WBC # BLD: 5.3 K/CU MM (ref 4–10.5)

## 2024-06-10 PROCEDURE — 85025 COMPLETE CBC W/AUTO DIFF WBC: CPT

## 2024-06-10 PROCEDURE — 80053 COMPREHEN METABOLIC PANEL: CPT

## 2024-06-10 PROCEDURE — 84443 ASSAY THYROID STIM HORMONE: CPT

## 2024-06-10 PROCEDURE — 36415 COLL VENOUS BLD VENIPUNCTURE: CPT

## 2024-06-11 ENCOUNTER — HOSPITAL ENCOUNTER (OUTPATIENT)
Dept: INFUSION THERAPY | Age: 59
Discharge: HOME | End: 2024-06-11
Payer: MEDICAID

## 2024-06-11 ENCOUNTER — OFFICE VISIT (OUTPATIENT)
Dept: ONCOLOGY | Age: 59
End: 2024-06-11
Payer: MEDICAID

## 2024-06-11 VITALS
DIASTOLIC BLOOD PRESSURE: 72 MMHG | SYSTOLIC BLOOD PRESSURE: 122 MMHG | TEMPERATURE: 98.2 F | HEIGHT: 67 IN | OXYGEN SATURATION: 99 % | WEIGHT: 149 LBS | BODY MASS INDEX: 23.39 KG/M2 | HEART RATE: 63 BPM

## 2024-06-11 VITALS
RESPIRATION RATE: 16 BRPM | SYSTOLIC BLOOD PRESSURE: 122 MMHG | OXYGEN SATURATION: 99 % | BODY MASS INDEX: 23.39 KG/M2 | TEMPERATURE: 98.2 F | HEIGHT: 67 IN | DIASTOLIC BLOOD PRESSURE: 72 MMHG | WEIGHT: 149 LBS | HEART RATE: 63 BPM

## 2024-06-11 VITALS
HEART RATE: 63 BPM | HEIGHT: 68 IN | OXYGEN SATURATION: 99 % | DIASTOLIC BLOOD PRESSURE: 72 MMHG | TEMPERATURE: 98.2 F | SYSTOLIC BLOOD PRESSURE: 122 MMHG | WEIGHT: 149.6 LBS | BODY MASS INDEX: 22.67 KG/M2

## 2024-06-11 DIAGNOSIS — Z11.59 SCREENING FOR VIRAL DISEASE: ICD-10-CM

## 2024-06-11 DIAGNOSIS — Z79.899 ENCOUNTER FOR LONG-TERM (CURRENT) USE OF MEDICATIONS: Primary | ICD-10-CM

## 2024-06-11 DIAGNOSIS — C15.9 ESOPHAGEAL ADENOCARCINOMA (HCC): ICD-10-CM

## 2024-06-11 DIAGNOSIS — C15.9 ESOPHAGEAL ADENOCARCINOMA (HCC): Primary | ICD-10-CM

## 2024-06-11 PROCEDURE — 3017F COLORECTAL CA SCREEN DOC REV: CPT | Performed by: INTERNAL MEDICINE

## 2024-06-11 PROCEDURE — 99214 OFFICE O/P EST MOD 30 MIN: CPT | Performed by: INTERNAL MEDICINE

## 2024-06-11 PROCEDURE — 99401 PREV MED CNSL INDIV APPRX 15: CPT | Performed by: INTERNAL MEDICINE

## 2024-06-11 PROCEDURE — G8420 CALC BMI NORM PARAMETERS: HCPCS | Performed by: INTERNAL MEDICINE

## 2024-06-11 PROCEDURE — 96413 CHEMO IV INFUSION 1 HR: CPT

## 2024-06-11 PROCEDURE — G8427 DOCREV CUR MEDS BY ELIG CLIN: HCPCS | Performed by: INTERNAL MEDICINE

## 2024-06-11 PROCEDURE — 2580000003 HC RX 258: Performed by: INTERNAL MEDICINE

## 2024-06-11 PROCEDURE — 6360000002 HC RX W HCPCS: Performed by: INTERNAL MEDICINE

## 2024-06-11 PROCEDURE — 1036F TOBACCO NON-USER: CPT | Performed by: INTERNAL MEDICINE

## 2024-06-11 RX ORDER — OXYCODONE HCL 20 MG/ML
10 CONCENTRATE, ORAL ORAL EVERY 4 HOURS PRN
Qty: 90 ML | Refills: 0 | Status: SHIPPED | OUTPATIENT
Start: 2024-06-11 | End: 2024-07-11

## 2024-06-11 RX ORDER — SODIUM CHLORIDE 0.9 % (FLUSH) 0.9 %
5-40 SYRINGE (ML) INJECTION PRN
Status: DISCONTINUED | OUTPATIENT
Start: 2024-06-11 | End: 2024-06-12 | Stop reason: HOSPADM

## 2024-06-11 RX ORDER — SENNOSIDES 8.8 MG/5ML
8.8 LIQUID ORAL 3 TIMES DAILY PRN
Qty: 450 ML | Refills: 1 | Status: SHIPPED | OUTPATIENT
Start: 2024-06-11 | End: 2024-07-11

## 2024-06-11 RX ADMIN — SODIUM CHLORIDE, PRESERVATIVE FREE 20 ML: 5 INJECTION INTRAVENOUS at 10:53

## 2024-06-11 RX ADMIN — SODIUM CHLORIDE 200 MG: 9 INJECTION, SOLUTION INTRAVENOUS at 10:11

## 2024-06-11 NOTE — PROGRESS NOTES
MA Rooming Questions  Patient: Denisa Basurto  MRN: [id number]    Date: 6/11/2024        1. Do you have any new issues?   yes - Patient wants to discuss dosage of the oxycodone.  Possibility of change pain medication.   2. Do you need any refills on medications?    yes - Omeprazole. Patient states is also out of the senokot, states  has prescribed it in the past. Patient is also out of the oxycodone. But does not want it pended due to wanting to discuss dosage and the possibility of change the pain medication.    3. Have you had any imaging done since your last visit?   no    4. Have you been hospitalized or seen in the emergency room since your last visit here?   no    5. Did the patient have a depression screening completed today? No    No data recorded     PHQ-9 Given to (if applicable):               PHQ-9 Score (if applicable):                     [] Positive     []  Negative              Does question #9 need addressed (if applicable)                     [] Yes    []  No               Carmen Mccartney MA      
surgery after taking care of GE junction adenocarcinoma. Second lung nodule biopsy showed benign findings only.       I answered all her questions and concerns for today. Recent imaging and labs were reviewed and discussed with the patient.   Total time spent with this patient encounter - 32 minutes

## 2024-06-11 NOTE — PROGRESS NOTES
Patient arrived to treatment suite for immunotherapy infusion.  Right chest mediport accessed and good blood return noted.  Patient has no questions or concerns for the doctor at this time.  Treatment approved and given.  Patient tolerated well.  Left treatment suite ambulatory.  Discharge instructions provided.    Patient's status assessed and documented appropriately.  All labs and required results were also reviewed today.  Treatment parameters have been reviewed.  Today's treatment has been approved by the provider.  Treatment orders and medication sequencing (when applicable) was verified by 2 registered nurses.  The treatment plan was confirmed with the patient prior to administration, and the patient understands the need to report any treatment-related symptoms.    Prior to administration, when applicable, the following 8 elements of medication administration were reviewed with 2nd Registered Nurse prior to dosing: drug name, drug dose, infusion volume when prepared in a syringe, rate of administration, expiration dates and/or times, appearance and integrity of drug(s), and rate of pump for infusion.  The 5 rights of medication administration have been verified.

## 2024-06-11 NOTE — PROGRESS NOTES
Palliative Care  Assessment    Medical Oncology History:    Late December, 2023 was evaluated for was symptoms of significant weight loss, abdominal discomfort, vomiting and the difficulty swallowing.  The symptoms had been going on for few months and she had lost more than 40 pounds.  CT December 20, 2023 heterogeneous solid mass in the gastric cardia and gastroesophageal junction 6.8 x 7.7 cm.  CT chest 2.4 cm noncalcified pulmonary nodule left upper lobe.  EGD December 29, 2023 obstructing distal esophageal mass ulcerated and necrotic, pediatric scope could not be passed through.  Biopsy moderately differentiated adenocarcinoma.  January 2, 2024 left upper lobe lung biopsy pathology well-differentiated neuroendocrine tumor consistent with a typical carcinoid.  A PET scan January 25, 2024 hypermetabolic distal esophageal mass extending to the gastric cardia and fundus.  2 hypermetabolic left upper lobe pulmonary nodules.  February 6, 2024 started on neoadjuvant therapy with Keytruda CT chest abdomen pelvis done on April 23, 2024 significant improvement in mass involving the GE junction.  She also is being evaluated by surgeon in [place] for possible laparoscopic evaluation and resection may be following the completion of these treatments.        Other Medical Problems:   In general she has been fairly healthy     Personal History     A. Life Time:   She was  in early 2000, had 3 grown children currently ages 31, 33 and 35.  Has 10 grandchildren.  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  Currently she has a very nice boyfriend they have been together for more than 13 years.  She has long history of smoking, still does.  No history of excessive ethanol intake     B. Family:   As above    Physical Symptoms:   June 11, 2024 overall she was doing much better.  She was able to swallow more and was gaining weight.  Pain was still under well control with oxycodone.  She was

## 2024-06-11 NOTE — PROGRESS NOTES
MA Rooming Questions  Patient: Denisa Basurto  MRN: [id number]    Date: 6/11/2024        3 month Palliative Care Follow up.         5. Did the patient have a depression screening completed today? No    No data recorded     PHQ-9 Given to (if applicable):               PHQ-9 Score (if applicable):                     [] Positive     []  Negative              Does question #9 need addressed (if applicable)                     [] Yes    []  No               Carmen Mccartney MA

## 2024-07-01 ENCOUNTER — HOSPITAL ENCOUNTER (OUTPATIENT)
Dept: INFUSION THERAPY | Age: 59
Discharge: HOME | End: 2024-07-01
Payer: MEDICAID

## 2024-07-01 DIAGNOSIS — Z11.59 SCREENING FOR VIRAL DISEASE: ICD-10-CM

## 2024-07-01 DIAGNOSIS — C15.9 ESOPHAGEAL ADENOCARCINOMA (HCC): ICD-10-CM

## 2024-07-01 DIAGNOSIS — Z79.899 ENCOUNTER FOR LONG-TERM (CURRENT) USE OF MEDICATIONS: ICD-10-CM

## 2024-07-01 LAB
ALBUMIN SERPL-MCNC: 3.5 GM/DL (ref 3.4–5)
ALP BLD-CCNC: 93 IU/L (ref 40–129)
ALT SERPL-CCNC: 8 U/L (ref 10–40)
ANION GAP SERPL CALCULATED.3IONS-SCNC: 10 MMOL/L (ref 7–16)
AST SERPL-CCNC: 13 IU/L (ref 15–37)
BASOPHILS ABSOLUTE: 0 K/CU MM
BASOPHILS RELATIVE PERCENT: 0.5 % (ref 0–1)
BILIRUB SERPL-MCNC: 0.3 MG/DL (ref 0–1)
BUN SERPL-MCNC: 15 MG/DL (ref 6–23)
CALCIUM SERPL-MCNC: 9.4 MG/DL (ref 8.3–10.6)
CHLORIDE BLD-SCNC: 104 MMOL/L (ref 99–110)
CO2: 28 MMOL/L (ref 21–32)
CREAT SERPL-MCNC: 1 MG/DL (ref 0.6–1.1)
DIFFERENTIAL TYPE: ABNORMAL
EOSINOPHILS ABSOLUTE: 0.5 K/CU MM
EOSINOPHILS RELATIVE PERCENT: 13.3 % (ref 0–3)
GFR, ESTIMATED: 65 ML/MIN/1.73M2
GLUCOSE SERPL-MCNC: 89 MG/DL (ref 70–99)
HCT VFR BLD CALC: 37.6 % (ref 37–47)
HEMOGLOBIN: 12 GM/DL (ref 12.5–16)
LYMPHOCYTES ABSOLUTE: 1.1 K/CU MM
LYMPHOCYTES RELATIVE PERCENT: 27.5 % (ref 24–44)
MCH RBC QN AUTO: 28.3 PG (ref 27–31)
MCHC RBC AUTO-ENTMCNC: 31.9 % (ref 32–36)
MCV RBC AUTO: 88.7 FL (ref 78–100)
MONOCYTES ABSOLUTE: 0.4 K/CU MM
MONOCYTES RELATIVE PERCENT: 9.1 % (ref 0–4)
NEUTROPHILS ABSOLUTE: 2 K/CU MM
NEUTROPHILS RELATIVE PERCENT: 49.6 % (ref 36–66)
PDW BLD-RTO: 15 % (ref 11.7–14.9)
PLATELET # BLD: 218 K/CU MM (ref 140–440)
PMV BLD AUTO: 9.4 FL (ref 7.5–11.1)
POTASSIUM SERPL-SCNC: 3.8 MMOL/L (ref 3.5–5.1)
RBC # BLD: 4.24 M/CU MM (ref 4.2–5.4)
SODIUM BLD-SCNC: 142 MMOL/L (ref 135–145)
TOTAL PROTEIN: 6.5 GM/DL (ref 6.4–8.2)
WBC # BLD: 4.1 K/CU MM (ref 4–10.5)

## 2024-07-01 PROCEDURE — 85025 COMPLETE CBC W/AUTO DIFF WBC: CPT

## 2024-07-01 PROCEDURE — 80053 COMPREHEN METABOLIC PANEL: CPT

## 2024-07-01 PROCEDURE — 36415 COLL VENOUS BLD VENIPUNCTURE: CPT

## 2024-07-02 ENCOUNTER — HOSPITAL ENCOUNTER (OUTPATIENT)
Dept: INFUSION THERAPY | Age: 59
Discharge: HOME | End: 2024-07-02
Payer: MEDICAID

## 2024-07-02 ENCOUNTER — OFFICE VISIT (OUTPATIENT)
Dept: ONCOLOGY | Age: 59
End: 2024-07-02
Payer: MEDICAID

## 2024-07-02 VITALS
SYSTOLIC BLOOD PRESSURE: 105 MMHG | OXYGEN SATURATION: 99 % | WEIGHT: 146 LBS | TEMPERATURE: 98.3 F | HEIGHT: 67 IN | HEART RATE: 57 BPM | BODY MASS INDEX: 22.91 KG/M2 | DIASTOLIC BLOOD PRESSURE: 57 MMHG

## 2024-07-02 VITALS
SYSTOLIC BLOOD PRESSURE: 105 MMHG | DIASTOLIC BLOOD PRESSURE: 57 MMHG | HEART RATE: 57 BPM | BODY MASS INDEX: 22.91 KG/M2 | OXYGEN SATURATION: 99 % | WEIGHT: 146 LBS | HEIGHT: 67 IN | TEMPERATURE: 98.3 F

## 2024-07-02 DIAGNOSIS — C15.9 ESOPHAGEAL ADENOCARCINOMA (HCC): ICD-10-CM

## 2024-07-02 DIAGNOSIS — C15.9 ESOPHAGEAL ADENOCARCINOMA (HCC): Primary | ICD-10-CM

## 2024-07-02 DIAGNOSIS — R11.2 CHEMOTHERAPY INDUCED NAUSEA AND VOMITING: ICD-10-CM

## 2024-07-02 DIAGNOSIS — T45.1X5A CHEMOTHERAPY INDUCED NAUSEA AND VOMITING: ICD-10-CM

## 2024-07-02 DIAGNOSIS — Z11.59 SCREENING FOR VIRAL DISEASE: ICD-10-CM

## 2024-07-02 DIAGNOSIS — Z79.899 ENCOUNTER FOR LONG-TERM (CURRENT) USE OF MEDICATIONS: Primary | ICD-10-CM

## 2024-07-02 DIAGNOSIS — Z79.899 ENCOUNTER FOR LONG-TERM (CURRENT) USE OF MEDICATIONS: ICD-10-CM

## 2024-07-02 PROCEDURE — G8427 DOCREV CUR MEDS BY ELIG CLIN: HCPCS | Performed by: INTERNAL MEDICINE

## 2024-07-02 PROCEDURE — 96413 CHEMO IV INFUSION 1 HR: CPT

## 2024-07-02 PROCEDURE — 1036F TOBACCO NON-USER: CPT | Performed by: INTERNAL MEDICINE

## 2024-07-02 PROCEDURE — 3017F COLORECTAL CA SCREEN DOC REV: CPT | Performed by: INTERNAL MEDICINE

## 2024-07-02 PROCEDURE — 2580000003 HC RX 258: Performed by: INTERNAL MEDICINE

## 2024-07-02 PROCEDURE — 6360000002 HC RX W HCPCS: Performed by: INTERNAL MEDICINE

## 2024-07-02 PROCEDURE — G8420 CALC BMI NORM PARAMETERS: HCPCS | Performed by: INTERNAL MEDICINE

## 2024-07-02 PROCEDURE — 99214 OFFICE O/P EST MOD 30 MIN: CPT | Performed by: INTERNAL MEDICINE

## 2024-07-02 RX ORDER — SODIUM CHLORIDE 0.9 % (FLUSH) 0.9 %
5-40 SYRINGE (ML) INJECTION PRN
Status: DISCONTINUED | OUTPATIENT
Start: 2024-07-02 | End: 2024-07-03 | Stop reason: HOSPADM

## 2024-07-02 RX ORDER — OMEPRAZOLE 40 MG/1
CAPSULE, DELAYED RELEASE ORAL
COMMUNITY
Start: 2024-06-26

## 2024-07-02 RX ORDER — SODIUM CHLORIDE 9 MG/ML
5-250 INJECTION, SOLUTION INTRAVENOUS PRN
Status: DISCONTINUED | OUTPATIENT
Start: 2024-07-02 | End: 2024-07-03 | Stop reason: HOSPADM

## 2024-07-02 RX ADMIN — SODIUM CHLORIDE 200 MG: 9 INJECTION, SOLUTION INTRAVENOUS at 10:10

## 2024-07-02 RX ADMIN — SODIUM CHLORIDE, PRESERVATIVE FREE 20 ML: 5 INJECTION INTRAVENOUS at 10:49

## 2024-07-02 ASSESSMENT — PATIENT HEALTH QUESTIONNAIRE - PHQ9
SUM OF ALL RESPONSES TO PHQ QUESTIONS 1-9: 0
SUM OF ALL RESPONSES TO PHQ QUESTIONS 1-9: 0
2. FEELING DOWN, DEPRESSED OR HOPELESS: NOT AT ALL
SUM OF ALL RESPONSES TO PHQ QUESTIONS 1-9: 0
SUM OF ALL RESPONSES TO PHQ QUESTIONS 1-9: 0
1. LITTLE INTEREST OR PLEASURE IN DOING THINGS: NOT AT ALL
SUM OF ALL RESPONSES TO PHQ9 QUESTIONS 1 & 2: 0

## 2024-07-02 NOTE — PROGRESS NOTES
MA Rooming Questions  Patient: Denisa Basurto  MRN: [id number]    Date: 7/2/2024        1. Do you have any new issues?   yes - when is she due for her next scans ?         2. Do you need any refills on medications?    no    3. Have you had any imaging done since your last visit?   no    4. Have you been hospitalized or seen in the emergency room since your last visit here?   no    5. Did the patient have a depression screening completed today? Yes    PHQ-9 Total Score: 0 (7/2/2024 11:40 AM)       PHQ-9 Given to (if applicable):               PHQ-9 Score (if applicable):                     [] Positive     []  Negative              Does question #9 need addressed (if applicable)                     [] Yes    []  No               Shanti Bustamante CMA      
adenocarcinoma (HCC) 2024    History of blood transfusion           Past Surgery History:    Past Surgical History:   Procedure Laterality Date    ASD REPAIR      1969 repaired hole in heart    PORT SURGERY N/A 2024    PORT INSERTION performed by Everett Cali DO at [facility] OR    TONSILLECTOMY      age 8    WRIST FRACTURE SURGERY Left 2021    LEFT WRIST OPEN REDUCTION INTERNAL FIXATION performed by Guido Walsh DO at [facility] OR     Social History:   Social History     Socioeconomic History    Marital status:      Spouse name: None    Number of children: None    Years of education: None    Highest education level: None   Tobacco Use    Smoking status: Former     Current packs/day: 0.00     Average packs/day: 2.0 packs/day for 35.0 years (70.0 ttl pk-yrs)     Types: Cigarettes     Quit date: 2024     Years since quittin.3    Smokeless tobacco: Never   Vaping Use    Vaping Use: Never used   Substance and Sexual Activity    Alcohol use: Not Currently    Drug use: Not Currently    Sexual activity: Not Currently     Family History:    Family History   Problem Relation Age of Onset    Stroke Mother     High Blood Pressure Mother     Cancer Father         Lung CA    Other Brother         Stomach issues      No Known Allergies     Review of Systems:  Constitutional:  No weight loss, No fever, No chills, No night sweats.  Energy level is decent today.  Eyes:  No diplopia, No transient or permanent loss of vision, No scotomata.  ENT / Mouth:  No epistaxis, No dysphagia, No hoarseness, No oral ulcers, No gingival bleeding.  No sore throat, No postnasal drip, No nasal drip, No mouth pain, No sinus pain, No tinnitus, Normal hearing.  Cardiovascular:  No chest pain, No palpitations, No syncope, No upper extremity edema, No lower extremity edema, No calf discomfort.  Respiratory:  No cough.  No hemoptysis, No pleurisy, No wheezing, No dyspnea.  Breast:  No breast mass, No pain, No nipple

## 2024-07-02 NOTE — PROGRESS NOTES
Status appropriately assessed and documented. All required labs and results reviewed. Treatment approved by provider. Treatment orders and medications verified by 2 Registered Nurses where applicable. Treatment plan was confirmed with patient prior to administration, and educated the need to report any treatment-related symptoms      Ambulated to infusion area, here today for chemotherapy. Pt has been itching and digging at skin, will be seeing a dematologist. Right chest mediport accessed, positive blood return noted. Labs reviewed, Labs within defined limits.  Chemo administered as ordered. Call light within reach. Tolerated infusion without incident.  Discharge instructions provided. Had an OV with Dr. Guthrie after infusion.

## 2024-07-18 ENCOUNTER — TELEPHONE (OUTPATIENT)
Dept: ONCOLOGY | Age: 59
End: 2024-07-18

## 2024-07-18 DIAGNOSIS — Z11.59 SCREENING FOR VIRAL DISEASE: ICD-10-CM

## 2024-07-18 DIAGNOSIS — C15.9 ESOPHAGEAL ADENOCARCINOMA (HCC): Primary | ICD-10-CM

## 2024-07-18 DIAGNOSIS — Z79.899 ENCOUNTER FOR LONG-TERM (CURRENT) USE OF MEDICATIONS: ICD-10-CM

## 2024-07-18 NOTE — TELEPHONE ENCOUNTER
Left message with time and prep for PET scan to be done on 7/31/24 [facility] arrival at 930 AM. Informed to call with any questions.

## 2024-07-21 NOTE — PROGRESS NOTES
Patient Name:  Denisa Basurto  Patient :  1965  Patient MRN:  [id number]     Primary Oncologist: Anthony Guthrie MD  Referring Provider: Osbaldo Walton APRN - NP     Date of Service: 2024      Chief Complaint:    Chief Complaint   Patient presents with    Follow-up    Chemotherapy     Patient Active Problem List:     Closed fracture of left distal radius and ulna     Esophageal adenocarcinoma (HCC)    HPI:   Denisa Basurto is a 58 y.o. female with no pertinent medical history referred to me on 24 for evaluation of esophageal adenocarcinoma.     She initially presented to ED with nausea vomiting and poor oral intake.  She is having stomach issues since 10, 2023. She has been unable to keep solids down and has about 40 pound weight loss.     CT abdomen and pelvis done on 2023 showed a heterogeneous solid mass in the gastric cardia extending to the gastroesophageal junction measuring about 6.6 x 6.8 x 7.7 cm.  CT chest done 2023 showed a 2.4 cm noncalcified pulmonary nodule within the left upper lobe as well as an additional 1.6 cm nodule in the left upper lobe.     Dr. Martinez attempted an EGD on 2023 and found obstructing distal esophageal mass, which is ulcerated and necrotic. He couldn't able to pass a pediatric scope beyond the mass.  Biopsy was done and pathology revealed moderately differentiated adenocarcinoma. Surgical oncology placed a feeding tube on 2024.     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     She was seen by CT surgeon, Dr. Figueroa at [facility] on 24. He scheduled for CT guided biopsy of second lung nodule and it is scheduled to be done on 24.     I requested MSI which came back unstable. PD-L1 for Keytruda - expressed (CPS >10) and Her2 negative (score 1+).     PET/CT scan on 24 showed Intensely hypermetabolic

## 2024-07-22 ENCOUNTER — HOSPITAL ENCOUNTER (OUTPATIENT)
Dept: INFUSION THERAPY | Age: 59
Discharge: HOME | End: 2024-07-22
Payer: MEDICAID

## 2024-07-22 DIAGNOSIS — Z11.59 SCREENING FOR VIRAL DISEASE: ICD-10-CM

## 2024-07-22 DIAGNOSIS — C15.9 ESOPHAGEAL ADENOCARCINOMA (HCC): ICD-10-CM

## 2024-07-22 DIAGNOSIS — Z79.899 ENCOUNTER FOR LONG-TERM (CURRENT) USE OF MEDICATIONS: ICD-10-CM

## 2024-07-22 LAB
ALBUMIN SERPL-MCNC: 3.7 GM/DL (ref 3.4–5)
ALP BLD-CCNC: 70 IU/L (ref 40–128)
ALT SERPL-CCNC: 9 U/L (ref 10–40)
ANION GAP SERPL CALCULATED.3IONS-SCNC: 11 MMOL/L (ref 7–16)
AST SERPL-CCNC: 15 IU/L (ref 15–37)
BASOPHILS ABSOLUTE: 0 K/CU MM
BASOPHILS RELATIVE PERCENT: 0.4 % (ref 0–1)
BILIRUB SERPL-MCNC: 0.2 MG/DL (ref 0–1)
BUN SERPL-MCNC: 16 MG/DL (ref 6–23)
CALCIUM SERPL-MCNC: 9.2 MG/DL (ref 8.3–10.6)
CHLORIDE BLD-SCNC: 104 MMOL/L (ref 99–110)
CO2: 28 MMOL/L (ref 21–32)
CREAT SERPL-MCNC: 1 MG/DL (ref 0.6–1.1)
DIFFERENTIAL TYPE: ABNORMAL
EOSINOPHILS ABSOLUTE: 0.7 K/CU MM
EOSINOPHILS RELATIVE PERCENT: 15.1 % (ref 0–3)
GFR, ESTIMATED: 65 ML/MIN/1.73M2
GLUCOSE SERPL-MCNC: 99 MG/DL (ref 70–99)
HCT VFR BLD CALC: 39.3 % (ref 37–47)
HEMOGLOBIN: 12.6 GM/DL (ref 12.5–16)
LYMPHOCYTES ABSOLUTE: 1.4 K/CU MM
LYMPHOCYTES RELATIVE PERCENT: 30 % (ref 24–44)
MCH RBC QN AUTO: 28.6 PG (ref 27–31)
MCHC RBC AUTO-ENTMCNC: 32.1 % (ref 32–36)
MCV RBC AUTO: 89.1 FL (ref 78–100)
MONOCYTES ABSOLUTE: 0.3 K/CU MM
MONOCYTES RELATIVE PERCENT: 6.9 % (ref 0–4)
NEUTROPHILS ABSOLUTE: 2.2 K/CU MM
NEUTROPHILS RELATIVE PERCENT: 47.6 % (ref 36–66)
PDW BLD-RTO: 14.5 % (ref 11.7–14.9)
PLATELET # BLD: 204 K/CU MM (ref 140–440)
PMV BLD AUTO: 9.3 FL (ref 7.5–11.1)
POTASSIUM SERPL-SCNC: 3.7 MMOL/L (ref 3.5–5.1)
RBC # BLD: 4.41 M/CU MM (ref 4.2–5.4)
SODIUM BLD-SCNC: 143 MMOL/L (ref 135–145)
T4 FREE SERPL-MCNC: 1.04 NG/DL (ref 0.9–1.8)
TOTAL PROTEIN: 7.1 GM/DL (ref 6.4–8.2)
TSH SERPL DL<=0.005 MIU/L-ACNC: 4.68 UIU/ML (ref 0.27–4.2)
WBC # BLD: 4.6 K/CU MM (ref 4–10.5)

## 2024-07-22 PROCEDURE — 80053 COMPREHEN METABOLIC PANEL: CPT

## 2024-07-22 PROCEDURE — 36415 COLL VENOUS BLD VENIPUNCTURE: CPT

## 2024-07-22 PROCEDURE — 85025 COMPLETE CBC W/AUTO DIFF WBC: CPT

## 2024-07-22 PROCEDURE — 84443 ASSAY THYROID STIM HORMONE: CPT

## 2024-07-22 PROCEDURE — 84439 ASSAY OF FREE THYROXINE: CPT

## 2024-07-23 ENCOUNTER — OFFICE VISIT (OUTPATIENT)
Dept: ONCOLOGY | Age: 59
End: 2024-07-23
Payer: MEDICAID

## 2024-07-23 ENCOUNTER — HOSPITAL ENCOUNTER (OUTPATIENT)
Dept: INFUSION THERAPY | Age: 59
Discharge: HOME | End: 2024-07-23
Payer: MEDICAID

## 2024-07-23 VITALS
WEIGHT: 145.13 LBS | HEART RATE: 78 BPM | HEIGHT: 67 IN | TEMPERATURE: 97.9 F | DIASTOLIC BLOOD PRESSURE: 73 MMHG | RESPIRATION RATE: 16 BRPM | SYSTOLIC BLOOD PRESSURE: 126 MMHG | OXYGEN SATURATION: 92 % | BODY MASS INDEX: 22.78 KG/M2

## 2024-07-23 VITALS
HEIGHT: 67 IN | SYSTOLIC BLOOD PRESSURE: 126 MMHG | WEIGHT: 145.2 LBS | TEMPERATURE: 97.9 F | DIASTOLIC BLOOD PRESSURE: 73 MMHG | BODY MASS INDEX: 22.79 KG/M2 | OXYGEN SATURATION: 92 % | HEART RATE: 92 BPM

## 2024-07-23 DIAGNOSIS — Z79.899 ENCOUNTER FOR LONG-TERM (CURRENT) USE OF MEDICATIONS: Primary | ICD-10-CM

## 2024-07-23 DIAGNOSIS — Z79.899 ENCOUNTER FOR LONG-TERM (CURRENT) USE OF MEDICATIONS: ICD-10-CM

## 2024-07-23 DIAGNOSIS — C15.9 ESOPHAGEAL ADENOCARCINOMA (HCC): ICD-10-CM

## 2024-07-23 DIAGNOSIS — C15.9 ESOPHAGEAL ADENOCARCINOMA (HCC): Primary | ICD-10-CM

## 2024-07-23 DIAGNOSIS — R11.2 CHEMOTHERAPY INDUCED NAUSEA AND VOMITING: ICD-10-CM

## 2024-07-23 DIAGNOSIS — T45.1X5A CHEMOTHERAPY INDUCED NAUSEA AND VOMITING: ICD-10-CM

## 2024-07-23 DIAGNOSIS — Z11.59 SCREENING FOR VIRAL DISEASE: ICD-10-CM

## 2024-07-23 PROCEDURE — 6360000002 HC RX W HCPCS: Performed by: INTERNAL MEDICINE

## 2024-07-23 PROCEDURE — 1036F TOBACCO NON-USER: CPT | Performed by: INTERNAL MEDICINE

## 2024-07-23 PROCEDURE — 99214 OFFICE O/P EST MOD 30 MIN: CPT | Performed by: INTERNAL MEDICINE

## 2024-07-23 PROCEDURE — G8420 CALC BMI NORM PARAMETERS: HCPCS | Performed by: INTERNAL MEDICINE

## 2024-07-23 PROCEDURE — 3017F COLORECTAL CA SCREEN DOC REV: CPT | Performed by: INTERNAL MEDICINE

## 2024-07-23 PROCEDURE — G8427 DOCREV CUR MEDS BY ELIG CLIN: HCPCS | Performed by: INTERNAL MEDICINE

## 2024-07-23 PROCEDURE — 96413 CHEMO IV INFUSION 1 HR: CPT

## 2024-07-23 PROCEDURE — 2580000003 HC RX 258: Performed by: INTERNAL MEDICINE

## 2024-07-23 RX ORDER — MEPERIDINE HYDROCHLORIDE 50 MG/ML
12.5 INJECTION INTRAMUSCULAR; INTRAVENOUS; SUBCUTANEOUS PRN
OUTPATIENT
Start: 2024-08-13

## 2024-07-23 RX ORDER — ACETAMINOPHEN 325 MG/1
650 TABLET ORAL
Status: CANCELLED | OUTPATIENT
Start: 2024-07-23

## 2024-07-23 RX ORDER — EPINEPHRINE 1 MG/ML
0.3 INJECTION, SOLUTION, CONCENTRATE INTRAVENOUS PRN
OUTPATIENT
Start: 2024-08-13

## 2024-07-23 RX ORDER — MELOXICAM 15 MG/1
15 TABLET ORAL DAILY
Qty: 30 TABLET | Refills: 3 | Status: SHIPPED | OUTPATIENT
Start: 2024-07-23

## 2024-07-23 RX ORDER — SODIUM CHLORIDE 9 MG/ML
INJECTION, SOLUTION INTRAVENOUS CONTINUOUS
OUTPATIENT
Start: 2024-08-13

## 2024-07-23 RX ORDER — OXYCODONE HCL 20 MG/ML
10 CONCENTRATE, ORAL ORAL EVERY 4 HOURS PRN
Qty: 90 ML | Refills: 0 | Status: SHIPPED | OUTPATIENT
Start: 2024-07-23 | End: 2024-08-22

## 2024-07-23 RX ORDER — SODIUM CHLORIDE 9 MG/ML
5-250 INJECTION, SOLUTION INTRAVENOUS PRN
OUTPATIENT
Start: 2024-08-13

## 2024-07-23 RX ORDER — ACETAMINOPHEN 325 MG/1
650 TABLET ORAL
OUTPATIENT
Start: 2024-08-13

## 2024-07-23 RX ORDER — ONDANSETRON 2 MG/ML
8 INJECTION INTRAMUSCULAR; INTRAVENOUS
OUTPATIENT
Start: 2024-08-13

## 2024-07-23 RX ORDER — SODIUM CHLORIDE 0.9 % (FLUSH) 0.9 %
5-40 SYRINGE (ML) INJECTION PRN
Status: CANCELLED | OUTPATIENT
Start: 2024-07-23

## 2024-07-23 RX ORDER — SODIUM CHLORIDE 9 MG/ML
INJECTION, SOLUTION INTRAVENOUS CONTINUOUS
Status: CANCELLED | OUTPATIENT
Start: 2024-07-23

## 2024-07-23 RX ORDER — HEPARIN SODIUM (PORCINE) LOCK FLUSH IV SOLN 100 UNIT/ML 100 UNIT/ML
500 SOLUTION INTRAVENOUS PRN
OUTPATIENT
Start: 2024-08-13

## 2024-07-23 RX ORDER — DIPHENHYDRAMINE HYDROCHLORIDE 50 MG/ML
50 INJECTION INTRAMUSCULAR; INTRAVENOUS
Status: CANCELLED | OUTPATIENT
Start: 2024-07-23

## 2024-07-23 RX ORDER — SODIUM CHLORIDE 0.9 % (FLUSH) 0.9 %
5-40 SYRINGE (ML) INJECTION PRN
OUTPATIENT
Start: 2024-08-13

## 2024-07-23 RX ORDER — ONDANSETRON 2 MG/ML
8 INJECTION INTRAMUSCULAR; INTRAVENOUS
Status: CANCELLED | OUTPATIENT
Start: 2024-07-23

## 2024-07-23 RX ORDER — EPINEPHRINE 1 MG/ML
0.3 INJECTION, SOLUTION, CONCENTRATE INTRAVENOUS PRN
Status: CANCELLED | OUTPATIENT
Start: 2024-07-23

## 2024-07-23 RX ORDER — ALBUTEROL SULFATE 90 UG/1
4 AEROSOL, METERED RESPIRATORY (INHALATION) PRN
OUTPATIENT
Start: 2024-08-13

## 2024-07-23 RX ORDER — SODIUM CHLORIDE 9 MG/ML
5-250 INJECTION, SOLUTION INTRAVENOUS PRN
Status: DISCONTINUED | OUTPATIENT
Start: 2024-07-23 | End: 2024-07-24 | Stop reason: HOSPADM

## 2024-07-23 RX ORDER — FAMOTIDINE 10 MG/ML
20 INJECTION, SOLUTION INTRAVENOUS
OUTPATIENT
Start: 2024-08-13

## 2024-07-23 RX ORDER — ALBUTEROL SULFATE 90 UG/1
4 AEROSOL, METERED RESPIRATORY (INHALATION) PRN
Status: CANCELLED | OUTPATIENT
Start: 2024-07-23

## 2024-07-23 RX ORDER — SODIUM CHLORIDE 0.9 % (FLUSH) 0.9 %
5-40 SYRINGE (ML) INJECTION PRN
Status: DISCONTINUED | OUTPATIENT
Start: 2024-07-23 | End: 2024-07-24 | Stop reason: HOSPADM

## 2024-07-23 RX ORDER — MEPERIDINE HYDROCHLORIDE 50 MG/ML
12.5 INJECTION INTRAMUSCULAR; INTRAVENOUS; SUBCUTANEOUS PRN
Status: CANCELLED | OUTPATIENT
Start: 2024-07-23

## 2024-07-23 RX ORDER — FAMOTIDINE 10 MG/ML
20 INJECTION, SOLUTION INTRAVENOUS
Status: CANCELLED | OUTPATIENT
Start: 2024-07-23

## 2024-07-23 RX ORDER — SODIUM CHLORIDE 9 MG/ML
5-250 INJECTION, SOLUTION INTRAVENOUS PRN
Status: CANCELLED | OUTPATIENT
Start: 2024-07-23

## 2024-07-23 RX ORDER — DIPHENHYDRAMINE HYDROCHLORIDE 50 MG/ML
50 INJECTION INTRAMUSCULAR; INTRAVENOUS
OUTPATIENT
Start: 2024-08-13

## 2024-07-23 RX ORDER — HYDROXYZINE HYDROCHLORIDE 25 MG/1
25 TABLET, FILM COATED ORAL EVERY 6 HOURS PRN
Qty: 40 TABLET | Refills: 2 | Status: SHIPPED | OUTPATIENT
Start: 2024-07-23

## 2024-07-23 RX ORDER — HEPARIN SODIUM (PORCINE) LOCK FLUSH IV SOLN 100 UNIT/ML 100 UNIT/ML
500 SOLUTION INTRAVENOUS PRN
Status: CANCELLED | OUTPATIENT
Start: 2024-07-23

## 2024-07-23 RX ADMIN — SODIUM CHLORIDE 200 MG: 9 INJECTION, SOLUTION INTRAVENOUS at 10:07

## 2024-07-23 RX ADMIN — SODIUM CHLORIDE 20 ML/HR: 9 INJECTION, SOLUTION INTRAVENOUS at 10:06

## 2024-07-23 RX ADMIN — SODIUM CHLORIDE, PRESERVATIVE FREE 10 ML: 5 INJECTION INTRAVENOUS at 10:46

## 2024-07-23 NOTE — PROGRESS NOTES
Pt here for tx. And OV. Port accessed with blood return noted. CBC CMP reviewed from 7/22 and within defined limits. TSH increased T4 WNL. Pt has no concerns or issues to discuss with physician at this time.     Patient's status assessed and documented appropriately.  All labs and required results were also reviewed today.  Treatment parameters have been reviewed.  Today's treatment has been approved by the provider.      Treatment orders and medication sequencing (when applicable) was verified by 2 registered nurses.  The treatment plan was confirmed with the patient prior to administration, and the patient understands the need to report any treatment-related symptoms.    Prior to administration, when applicable, the following 8 elements of medication administration were reviewed with 2nd Registered Nurse prior to dosing: drug name, drug dose, infusion volume when prepared in a syringe, rate of administration, expiration dates and/or times, appearance and integrity of drug(s), and rate of pump for infusion.  The 5 rights of medication administration have been verified.      Pt tolerated tx without incident left ambulatory declined discharge instructions

## 2024-07-23 NOTE — PROGRESS NOTES
MA Rooming Questions  Patient: Denisa Basurto  MRN: [id number]    Date: 7/23/2024        1. Do you have any new issues?   Yes - wheezing at night a lot      2. Do you need any refills on medications?    Yes -Hydroxyzine, Oxycodone liquid    3. Have you had any imaging done since your last visit?   no    4. Have you been hospitalized or seen in the emergency room since your last visit here?   no    5. Did the patient have a depression screening completed today? No    No data recorded     PHQ-9 Given to (if applicable):               PHQ-9 Score (if applicable):                     [] Positive     []  Negative              Does question #9 need addressed (if applicable)                     [] Yes    []  No               Kelsie Ayala CMA

## 2024-07-31 ENCOUNTER — HOSPITAL ENCOUNTER (OUTPATIENT)
Dept: PET IMAGING | Age: 59
Discharge: HOME | End: 2024-07-31
Attending: INTERNAL MEDICINE
Payer: MEDICAID

## 2024-07-31 DIAGNOSIS — Z79.899 ENCOUNTER FOR LONG-TERM (CURRENT) USE OF MEDICATIONS: ICD-10-CM

## 2024-07-31 DIAGNOSIS — T45.1X5A CHEMOTHERAPY INDUCED NAUSEA AND VOMITING: ICD-10-CM

## 2024-07-31 DIAGNOSIS — R11.2 CHEMOTHERAPY INDUCED NAUSEA AND VOMITING: ICD-10-CM

## 2024-07-31 DIAGNOSIS — C15.9 ESOPHAGEAL ADENOCARCINOMA (HCC): ICD-10-CM

## 2024-07-31 PROCEDURE — 3430000000 HC RX DIAGNOSTIC RADIOPHARMACEUTICAL: Performed by: INTERNAL MEDICINE

## 2024-07-31 PROCEDURE — A9609 HC RX DIAGNOSTIC RADIOPHARMACEUTICAL: HCPCS | Performed by: INTERNAL MEDICINE

## 2024-07-31 PROCEDURE — 2580000003 HC RX 258: Performed by: INTERNAL MEDICINE

## 2024-07-31 PROCEDURE — 78815 PET IMAGE W/CT SKULL-THIGH: CPT

## 2024-07-31 RX ORDER — FLUDEOXYGLUCOSE F 18 200 MCI/ML
14.36 INJECTION, SOLUTION INTRAVENOUS
Status: COMPLETED | OUTPATIENT
Start: 2024-07-31 | End: 2024-07-31

## 2024-07-31 RX ORDER — SODIUM CHLORIDE 0.9 % (FLUSH) 0.9 %
10 SYRINGE (ML) INJECTION PRN
Status: COMPLETED | OUTPATIENT
Start: 2024-07-31 | End: 2024-07-31

## 2024-07-31 RX ADMIN — FLUDEOXYGLUCOSE F 18 14.36 MILLICURIE: 200 INJECTION, SOLUTION INTRAVENOUS at 10:02

## 2024-07-31 RX ADMIN — SODIUM CHLORIDE, PRESERVATIVE FREE 10 ML: 5 INJECTION INTRAVENOUS at 10:02

## 2024-08-08 ENCOUNTER — APPOINTMENT (OUTPATIENT)
Dept: GENERAL RADIOLOGY | Age: 59
End: 2024-08-08
Payer: MEDICAID

## 2024-08-08 ENCOUNTER — CLINICAL DOCUMENTATION (OUTPATIENT)
Dept: ONCOLOGY | Age: 59
End: 2024-08-08

## 2024-08-08 ENCOUNTER — APPOINTMENT (OUTPATIENT)
Dept: CT IMAGING | Age: 59
End: 2024-08-08
Payer: MEDICAID

## 2024-08-08 ENCOUNTER — HOSPITAL ENCOUNTER (EMERGENCY)
Age: 59
Discharge: HOME | End: 2024-08-08
Attending: STUDENT IN AN ORGANIZED HEALTH CARE EDUCATION/TRAINING PROGRAM
Payer: MEDICAID

## 2024-08-08 VITALS
HEIGHT: 68 IN | HEART RATE: 96 BPM | OXYGEN SATURATION: 99 % | DIASTOLIC BLOOD PRESSURE: 86 MMHG | RESPIRATION RATE: 18 BRPM | TEMPERATURE: 98 F | SYSTOLIC BLOOD PRESSURE: 139 MMHG | BODY MASS INDEX: 21.98 KG/M2 | WEIGHT: 145 LBS

## 2024-08-08 DIAGNOSIS — R79.89 ELEVATED TROPONIN: ICD-10-CM

## 2024-08-08 DIAGNOSIS — J44.1 COPD EXACERBATION (HCC): Primary | ICD-10-CM

## 2024-08-08 DIAGNOSIS — R06.02 SHORTNESS OF BREATH: ICD-10-CM

## 2024-08-08 DIAGNOSIS — R79.89 ELEVATED SERUM CREATININE: ICD-10-CM

## 2024-08-08 LAB
ALBUMIN SERPL-MCNC: 3.7 GM/DL (ref 3.4–5)
ALP BLD-CCNC: 76 IU/L (ref 40–128)
ALT SERPL-CCNC: 10 U/L (ref 10–40)
ANION GAP SERPL CALCULATED.3IONS-SCNC: 11 MMOL/L (ref 7–16)
AST SERPL-CCNC: 23 IU/L (ref 15–37)
BASE EXCESS: 1 (ref 0–3)
BASOPHILS ABSOLUTE: 0.1 K/CU MM
BASOPHILS RELATIVE PERCENT: 0.7 % (ref 0–1)
BILIRUB SERPL-MCNC: 0.2 MG/DL (ref 0–1)
BUN SERPL-MCNC: 18 MG/DL (ref 6–23)
CALCIUM SERPL-MCNC: 9 MG/DL (ref 8.3–10.6)
CHLORIDE BLD-SCNC: 101 MMOL/L (ref 99–110)
CO2: 26 MMOL/L (ref 21–32)
CREAT SERPL-MCNC: 1.2 MG/DL (ref 0.6–1.1)
DIFFERENTIAL TYPE: ABNORMAL
EOSINOPHILS ABSOLUTE: 0.8 K/CU MM
EOSINOPHILS RELATIVE PERCENT: 11 % (ref 0–3)
GFR, ESTIMATED: 52 ML/MIN/1.73M2
GLUCOSE SERPL-MCNC: 110 MG/DL (ref 70–99)
HCO3 VENOUS: 25.4 MMOL/L (ref 22–29)
HCT VFR BLD CALC: 39.8 % (ref 37–47)
HEMOGLOBIN: 12.6 GM/DL (ref 12.5–16)
IMMATURE NEUTROPHIL %: 0.1 % (ref 0–0.43)
LYMPHOCYTES ABSOLUTE: 1.4 K/CU MM
LYMPHOCYTES RELATIVE PERCENT: 19.6 % (ref 24–44)
MCH RBC QN AUTO: 28.4 PG (ref 27–31)
MCHC RBC AUTO-ENTMCNC: 31.7 % (ref 32–36)
MCV RBC AUTO: 89.8 FL (ref 78–100)
MONOCYTES ABSOLUTE: 0.5 K/CU MM
MONOCYTES RELATIVE PERCENT: 6.6 % (ref 0–4)
NEUTROPHILS ABSOLUTE: 4.3 K/CU MM
NEUTROPHILS RELATIVE PERCENT: 62 % (ref 36–66)
NUCLEATED RBC %: 0 %
O2 SAT, VEN: 51.8 % (ref 50–70)
PCO2 VENOUS: 47 MMHG (ref 41–51)
PDW BLD-RTO: 13.5 % (ref 11.7–14.9)
PH VENOUS: 7.34 (ref 7.32–7.43)
PLATELET # BLD: 219 K/CU MM (ref 140–440)
PMV BLD AUTO: 9.3 FL (ref 7.5–11.1)
PO2 VENOUS: 30 MMHG (ref 28–48)
POTASSIUM SERPL-SCNC: 4.5 MMOL/L (ref 3.5–5.1)
RBC # BLD: 4.43 M/CU MM (ref 4.2–5.4)
SODIUM BLD-SCNC: 138 MMOL/L (ref 135–145)
TOTAL IMMATURE NEUTOROPHIL: 0.01 K/CU MM
TOTAL NUCLEATED RBC: 0 K/CU MM
TOTAL PROTEIN: 7.8 GM/DL (ref 6.4–8.2)
TROPONIN, HIGH SENSITIVITY: 16 NG/L (ref 0–14)
TROPONIN, HIGH SENSITIVITY: 16 NG/L (ref 0–14)
WBC # BLD: 6.9 K/CU MM (ref 4–10.5)

## 2024-08-08 PROCEDURE — 85025 COMPLETE CBC W/AUTO DIFF WBC: CPT

## 2024-08-08 PROCEDURE — 94640 AIRWAY INHALATION TREATMENT: CPT

## 2024-08-08 PROCEDURE — 6360000004 HC RX CONTRAST MEDICATION: Performed by: STUDENT IN AN ORGANIZED HEALTH CARE EDUCATION/TRAINING PROGRAM

## 2024-08-08 PROCEDURE — 71260 CT THORAX DX C+: CPT

## 2024-08-08 PROCEDURE — 99285 EMERGENCY DEPT VISIT HI MDM: CPT

## 2024-08-08 PROCEDURE — 84484 ASSAY OF TROPONIN QUANT: CPT

## 2024-08-08 PROCEDURE — 96360 HYDRATION IV INFUSION INIT: CPT

## 2024-08-08 PROCEDURE — 6360000002 HC RX W HCPCS: Performed by: STUDENT IN AN ORGANIZED HEALTH CARE EDUCATION/TRAINING PROGRAM

## 2024-08-08 PROCEDURE — 71045 X-RAY EXAM CHEST 1 VIEW: CPT

## 2024-08-08 PROCEDURE — 6370000000 HC RX 637 (ALT 250 FOR IP): Performed by: STUDENT IN AN ORGANIZED HEALTH CARE EDUCATION/TRAINING PROGRAM

## 2024-08-08 PROCEDURE — 80053 COMPREHEN METABOLIC PANEL: CPT

## 2024-08-08 PROCEDURE — 93005 ELECTROCARDIOGRAM TRACING: CPT | Performed by: STUDENT IN AN ORGANIZED HEALTH CARE EDUCATION/TRAINING PROGRAM

## 2024-08-08 PROCEDURE — 82805 BLOOD GASES W/O2 SATURATION: CPT

## 2024-08-08 PROCEDURE — 2580000003 HC RX 258: Performed by: STUDENT IN AN ORGANIZED HEALTH CARE EDUCATION/TRAINING PROGRAM

## 2024-08-08 RX ORDER — PREDNISONE 50 MG/1
50 TABLET ORAL DAILY
Qty: 4 TABLET | Refills: 0 | Status: SHIPPED | OUTPATIENT
Start: 2024-08-09 | End: 2024-08-13

## 2024-08-08 RX ORDER — 0.9 % SODIUM CHLORIDE 0.9 %
500 INTRAVENOUS SOLUTION INTRAVENOUS ONCE
Status: COMPLETED | OUTPATIENT
Start: 2024-08-08 | End: 2024-08-08

## 2024-08-08 RX ORDER — HEPARIN 100 UNIT/ML
500 SYRINGE INTRAVENOUS ONCE
Status: COMPLETED | OUTPATIENT
Start: 2024-08-08 | End: 2024-08-08

## 2024-08-08 RX ORDER — ALBUTEROL SULFATE 90 UG/1
2 AEROSOL, METERED RESPIRATORY (INHALATION) 4 TIMES DAILY
Qty: 4 EACH | Refills: 0 | Status: SHIPPED | OUTPATIENT
Start: 2024-08-08 | End: 2024-09-07

## 2024-08-08 RX ORDER — PREDNISONE 20 MG/1
60 TABLET ORAL ONCE
Status: COMPLETED | OUTPATIENT
Start: 2024-08-08 | End: 2024-08-08

## 2024-08-08 RX ORDER — IPRATROPIUM BROMIDE AND ALBUTEROL SULFATE 2.5; .5 MG/3ML; MG/3ML
1 SOLUTION RESPIRATORY (INHALATION)
Status: COMPLETED | OUTPATIENT
Start: 2024-08-08 | End: 2024-08-08

## 2024-08-08 RX ADMIN — IOPAMIDOL 80 ML: 755 INJECTION, SOLUTION INTRAVENOUS at 19:05

## 2024-08-08 RX ADMIN — HEPARIN 500 UNITS: 100 SYRINGE at 20:40

## 2024-08-08 RX ADMIN — IPRATROPIUM BROMIDE AND ALBUTEROL SULFATE 1 DOSE: .5; 2.5 SOLUTION RESPIRATORY (INHALATION) at 16:50

## 2024-08-08 RX ADMIN — PREDNISONE 60 MG: 20 TABLET ORAL at 16:57

## 2024-08-08 RX ADMIN — SODIUM CHLORIDE 500 ML: 9 INJECTION, SOLUTION INTRAVENOUS at 18:05

## 2024-08-08 ASSESSMENT — PAIN SCALES - GENERAL
PAINLEVEL_OUTOF10: 0
PAINLEVEL_OUTOF10: 4

## 2024-08-08 ASSESSMENT — PAIN - FUNCTIONAL ASSESSMENT
PAIN_FUNCTIONAL_ASSESSMENT: 0-10
PAIN_FUNCTIONAL_ASSESSMENT: 0-10

## 2024-08-08 ASSESSMENT — PAIN DESCRIPTION - DESCRIPTORS: DESCRIPTORS: ACHING

## 2024-08-08 ASSESSMENT — PAIN DESCRIPTION - ORIENTATION: ORIENTATION: RIGHT;LEFT

## 2024-08-08 ASSESSMENT — PAIN DESCRIPTION - LOCATION: LOCATION: HAND

## 2024-08-08 NOTE — ED PROVIDER NOTES
Emergency Department Encounter    Patient: Denisa Basurto  MRN: [id number]  : 1965  Date of Evaluation: 2024  ED Provider:  Ben Schultz DO    Triage Chief Complaint:   Cough and Emesis    [place]:  Denisa Basurto is a 59 y.o. female that presents with concern of some mild shortness of breath and a cough.  Patient has known history of esophageal adenocarcinoma, currently receiving treatment and earlier today was at an appointment with her significant other and told the nurse that she was having some increased shortness of breath and cough.  She reports 2 days ago she felt nauseated and have an episode of emesis.  The nurse checked her SpO2 and was noted to be 87 to 90% and given that she was having increased shortness of breath she was sent here for further evaluation.  She reports a history of asthma and is a previous smoker but has since stopped smoking over the past 6 months.  She feels itself over the past couple of days she is feeling more short of breath.  She does not have an inhaler at home but is been using her 's and seems to have improvement with her breathing.  She has had a nonproductive cough.  No associated fevers or chills.  Has had some mild nausea as stated but still tolerating p.o. intake.  She reports some chest tightness but this is typical for her with asthma.  Declines any actual heaviness or pressure-like sensation.  She was mainly came to the emergency department today at the recommendation by the outpatient office and into sure that she did not have any evidence of blood clots.    MDM:    History from : Patient and Family     59-year-old female presenting as above.  Previous history of smoking.  History of asthma.  Does not have inhalers at home.  Concern for shortness of breath of the past few days.  Was recommended that she come in today for further evaluation given her history of cancer and to evaluate for pulmonary embolism.  On arrival she hemodynamically

## 2024-08-08 NOTE — PROGRESS NOTES
The patient was in the clinic today with her SO and was told to discuss her symptoms with the NN. This nurse was called to the waiting room and spoke with the patient and her SO, who reports increased SOB, cough as well as some nausea and emesis. This nurse checked the patient's O2 sat and her readings were between 87-90 %, the patient endorsed increased SOB from her baseline. This nurse notified the provider of the patient concerns and she was advised to be seen in the ED to r/o PE or pneumonitis related to her immunotherapy. Patient was agreeable to the plan and was being taken to the ED by her SO.  This nurse called and notified the ED charge RN

## 2024-08-08 NOTE — ED NOTES
Pt ambulated to restroom   
Pt to CT  
  Vitals:    08/08/24 1518 08/08/24 1650   BP: 139/86    Pulse: 96    Resp: 18    Temp: 98 °F (36.7 °C)    TempSrc: Oral    SpO2: 93% 99%   Weight: 65.8 kg (145 lb)    Height: 1.727 m (5' 8\")      PO Status: Regular  O2 Flow Rate:      Cardiac Rhythm:   Last documented pain medication administered: See MAR   NIH Score: NIH     Active LDA's:      Pertinent or High Risk Medications/Drips: no   If Yes, please provide details:   Blood Product Administration: no  If Yes, please provide details:     Recommendation    Incomplete orders   Additional Comments:   If any further questions, please call Sending RN at [phone]    Electronically signed by: Electronically signed by Alliyah Schoenleben, RN on 8/8/2024 at 6:57 PM

## 2024-08-09 LAB
EKG ATRIAL RATE: 65 BPM
EKG DIAGNOSIS: NORMAL
EKG P AXIS: -7 DEGREES
EKG P-R INTERVAL: 100 MS
EKG Q-T INTERVAL: 430 MS
EKG QRS DURATION: 88 MS
EKG QTC CALCULATION (BAZETT): 447 MS
EKG R AXIS: 42 DEGREES
EKG T AXIS: 65 DEGREES
EKG VENTRICULAR RATE: 65 BPM

## 2024-08-09 PROCEDURE — 93010 ELECTROCARDIOGRAM REPORT: CPT | Performed by: INTERNAL MEDICINE

## 2024-08-12 ENCOUNTER — HOSPITAL ENCOUNTER (OUTPATIENT)
Dept: INFUSION THERAPY | Age: 59
Discharge: HOME | End: 2024-08-12
Payer: MEDICAID

## 2024-08-12 DIAGNOSIS — Z79.899 ENCOUNTER FOR LONG-TERM (CURRENT) USE OF MEDICATIONS: ICD-10-CM

## 2024-08-12 DIAGNOSIS — Z11.59 SCREENING FOR VIRAL DISEASE: ICD-10-CM

## 2024-08-12 DIAGNOSIS — C15.9 ESOPHAGEAL ADENOCARCINOMA (HCC): ICD-10-CM

## 2024-08-12 LAB
ALBUMIN SERPL-MCNC: 3.9 GM/DL (ref 3.4–5)
ALP BLD-CCNC: 77 IU/L (ref 40–128)
ALT SERPL-CCNC: 10 U/L (ref 10–40)
ANION GAP SERPL CALCULATED.3IONS-SCNC: 11 MMOL/L (ref 7–16)
AST SERPL-CCNC: 16 IU/L (ref 15–37)
BASOPHILS ABSOLUTE: 0.1 K/CU MM
BASOPHILS RELATIVE PERCENT: 0.9 % (ref 0–1)
BILIRUB SERPL-MCNC: 0.3 MG/DL (ref 0–1)
BUN SERPL-MCNC: 16 MG/DL (ref 6–23)
CALCIUM SERPL-MCNC: 9 MG/DL (ref 8.3–10.6)
CHLORIDE BLD-SCNC: 103 MMOL/L (ref 99–110)
CO2: 25 MMOL/L (ref 21–32)
CREAT SERPL-MCNC: 1.1 MG/DL (ref 0.6–1.1)
DIFFERENTIAL TYPE: ABNORMAL
EOSINOPHILS ABSOLUTE: 0.7 K/CU MM
EOSINOPHILS RELATIVE PERCENT: 13.6 % (ref 0–3)
GFR, ESTIMATED: 58 ML/MIN/1.73M2
GLUCOSE SERPL-MCNC: 103 MG/DL (ref 70–99)
HCT VFR BLD CALC: 43.4 % (ref 37–47)
HEMOGLOBIN: 13.6 GM/DL (ref 12.5–16)
LYMPHOCYTES ABSOLUTE: 1.4 K/CU MM
LYMPHOCYTES RELATIVE PERCENT: 26 % (ref 24–44)
MCH RBC QN AUTO: 28.6 PG (ref 27–31)
MCHC RBC AUTO-ENTMCNC: 31.3 % (ref 32–36)
MCV RBC AUTO: 91.2 FL (ref 78–100)
MONOCYTES ABSOLUTE: 0.4 K/CU MM
MONOCYTES RELATIVE PERCENT: 7.4 % (ref 0–4)
NEUTROPHILS ABSOLUTE: 2.8 K/CU MM
NEUTROPHILS RELATIVE PERCENT: 52.1 % (ref 36–66)
PDW BLD-RTO: 14.4 % (ref 11.7–14.9)
PLATELET # BLD: 210 K/CU MM (ref 140–440)
PMV BLD AUTO: 9.5 FL (ref 7.5–11.1)
POTASSIUM SERPL-SCNC: 4.1 MMOL/L (ref 3.5–5.1)
RBC # BLD: 4.76 M/CU MM (ref 4.2–5.4)
SODIUM BLD-SCNC: 139 MMOL/L (ref 135–145)
TOTAL PROTEIN: 7.3 GM/DL (ref 6.4–8.2)
WBC # BLD: 5.4 K/CU MM (ref 4–10.5)

## 2024-08-12 PROCEDURE — 80053 COMPREHEN METABOLIC PANEL: CPT

## 2024-08-12 PROCEDURE — 85025 COMPLETE CBC W/AUTO DIFF WBC: CPT

## 2024-08-12 PROCEDURE — 36415 COLL VENOUS BLD VENIPUNCTURE: CPT

## 2024-08-13 ENCOUNTER — OFFICE VISIT (OUTPATIENT)
Dept: ONCOLOGY | Age: 59
End: 2024-08-13
Payer: MEDICAID

## 2024-08-13 ENCOUNTER — HOSPITAL ENCOUNTER (OUTPATIENT)
Dept: INFUSION THERAPY | Age: 59
Discharge: HOME | End: 2024-08-13
Payer: MEDICAID

## 2024-08-13 VITALS
HEART RATE: 60 BPM | OXYGEN SATURATION: 92 % | DIASTOLIC BLOOD PRESSURE: 77 MMHG | WEIGHT: 147.4 LBS | SYSTOLIC BLOOD PRESSURE: 118 MMHG | TEMPERATURE: 98.1 F | BODY MASS INDEX: 22.34 KG/M2 | HEIGHT: 68 IN

## 2024-08-13 VITALS
DIASTOLIC BLOOD PRESSURE: 77 MMHG | HEIGHT: 68 IN | TEMPERATURE: 98.1 F | RESPIRATION RATE: 18 BRPM | BODY MASS INDEX: 22.28 KG/M2 | SYSTOLIC BLOOD PRESSURE: 118 MMHG | WEIGHT: 147 LBS | OXYGEN SATURATION: 92 % | HEART RATE: 60 BPM

## 2024-08-13 DIAGNOSIS — T45.1X5A CHEMOTHERAPY INDUCED NAUSEA AND VOMITING: ICD-10-CM

## 2024-08-13 DIAGNOSIS — C15.9 ESOPHAGEAL ADENOCARCINOMA (HCC): ICD-10-CM

## 2024-08-13 DIAGNOSIS — Z79.899 ENCOUNTER FOR LONG-TERM (CURRENT) USE OF MEDICATIONS: Primary | ICD-10-CM

## 2024-08-13 DIAGNOSIS — C15.9 ESOPHAGEAL ADENOCARCINOMA (HCC): Primary | ICD-10-CM

## 2024-08-13 DIAGNOSIS — Z11.59 SCREENING FOR VIRAL DISEASE: ICD-10-CM

## 2024-08-13 DIAGNOSIS — R11.2 CHEMOTHERAPY INDUCED NAUSEA AND VOMITING: ICD-10-CM

## 2024-08-13 PROCEDURE — 99214 OFFICE O/P EST MOD 30 MIN: CPT | Performed by: INTERNAL MEDICINE

## 2024-08-13 PROCEDURE — 3017F COLORECTAL CA SCREEN DOC REV: CPT | Performed by: INTERNAL MEDICINE

## 2024-08-13 PROCEDURE — 6360000002 HC RX W HCPCS: Performed by: INTERNAL MEDICINE

## 2024-08-13 PROCEDURE — G8420 CALC BMI NORM PARAMETERS: HCPCS | Performed by: INTERNAL MEDICINE

## 2024-08-13 PROCEDURE — G8427 DOCREV CUR MEDS BY ELIG CLIN: HCPCS | Performed by: INTERNAL MEDICINE

## 2024-08-13 PROCEDURE — 2580000003 HC RX 258: Performed by: INTERNAL MEDICINE

## 2024-08-13 PROCEDURE — 96413 CHEMO IV INFUSION 1 HR: CPT

## 2024-08-13 PROCEDURE — 1036F TOBACCO NON-USER: CPT | Performed by: INTERNAL MEDICINE

## 2024-08-13 RX ORDER — SODIUM CHLORIDE 0.9 % (FLUSH) 0.9 %
5-40 SYRINGE (ML) INJECTION PRN
Status: DISCONTINUED | OUTPATIENT
Start: 2024-08-13 | End: 2024-08-14 | Stop reason: HOSPADM

## 2024-08-13 RX ORDER — OMEPRAZOLE 40 MG/1
40 CAPSULE, DELAYED RELEASE ORAL DAILY
Qty: 30 CAPSULE | Refills: 5 | Status: SHIPPED | OUTPATIENT
Start: 2024-08-13 | End: 2024-09-12

## 2024-08-13 RX ORDER — SODIUM CHLORIDE 9 MG/ML
5-250 INJECTION, SOLUTION INTRAVENOUS PRN
Status: DISCONTINUED | OUTPATIENT
Start: 2024-08-13 | End: 2024-08-14 | Stop reason: HOSPADM

## 2024-08-13 RX ADMIN — SODIUM CHLORIDE 200 MG: 9 INJECTION, SOLUTION INTRAVENOUS at 10:07

## 2024-08-13 RX ADMIN — SODIUM CHLORIDE 25 ML/HR: 9 INJECTION, SOLUTION INTRAVENOUS at 09:49

## 2024-08-13 NOTE — PROGRESS NOTES
Status appropriately assessed and documented. All required labs and results reviewed. Treatment approved by provider. Treatment orders and medications verified by 2 Registered Nurses where applicable. Treatment plan was confirmed with patient prior to administration, and educated the need to report any treatment-related symptoms      Ambulated to infusion area, here today for chemotherapy. Pt states has been SOB and dizziness, already on prednisone from PCP, had an OV with Dr. Álvarez. Right chest mediport accessed, positive blood return noted. Labs reviewed, Labs within defined limits.  Chemo administered as ordered. Call light within reach. Tolerated infusion without incident.  Discharge instructions declined.

## 2024-08-13 NOTE — PROGRESS NOTES
MA Rooming Questions  Patient: Denisa Basurto  MRN: [id number]    Date: 8/13/2024        1. Do you have any new issues?   yes - pt states she was sent to the ER from our office on 8/9; trouble breathing         2. Do you need any refills on medications?    no    3. Have you had any imaging done since your last visit?   yes - [facility]     4. Have you been hospitalized or seen in the emergency room since your last visit here?   yes - [facility] 8/9    5. Did the patient have a depression screening completed today? No    No data recorded     PHQ-9 Given to (if applicable):               PHQ-9 Score (if applicable):                     [] Positive     []  Negative              Does question #9 need addressed (if applicable)                     [] Yes    []  No               Nat Parekh MA      
adenocarcinoma. Hypermetabolic cavitary nodule in the upper lobe of the left lung consistent with a primary bronchogenic carcinoma. Hypermetabolic soft tissue density nodule in the upper lobe of the left lung. This could be primary or metastatic disease. Reactive axillary lymph nodes, bilaterally.    On August 13, 2024, she presented to me for follow up. 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    She is here for close monitoring of toxicity and side effects from immunotherapy. She is s/p 8th cycle of chemo and she is tolerating current chemo well. She doesn't encounter any major side effects from immunotherapy. I recommend to continue with immunotherapy and I will give her 9th cycle of immunotherapy today.     Reviewed labs done on 8/12/24. Her TSH was 4.68 on 7/22/24. T4 was normal. Will monitor TSH with reflex every 6 weekly.     Reviewed findings on PET/CT done on 7/31/24. She is achieving very good response to pembrolizumab. I agree with radiologist and I believe her axillary and inguinal lymphadenopathy are reactive, but will pay attention on repeat scans.     She has decided to continue with immunotherapy and prefers to delay surgery as well as SBRT for lung low grade NET.      D/W oncologist at [facility] Dr. Hernandez before. Will plan for laparoscopic evaluation after neoadjuvant immunotherapy before surgery.     Keytruda induced arthralgia/arthritis - since her pain hasn't controlled well, I will increase oxycodone liquid to 10 mg Q4 hourly (she was on 5 mg Q4 prn before). I asked her to take it prn. I will ask her to continue with naproxen 250 mg BID prn.     Constipation - narcotics induced. Will start senna liquid form.     Immunotherapy induced itchiness - she is on zyrtec and hydroxyzine 25 mg Q8 prn. Will refer her to dermatology for further evaluation.     Smoking cessation - she is on nicotine patch. I gave

## 2024-08-19 ENCOUNTER — PATIENT MESSAGE (OUTPATIENT)
Dept: ONCOLOGY | Age: 59
End: 2024-08-19

## 2024-08-20 RX ORDER — OMEPRAZOLE 40 MG/1
40 CAPSULE, DELAYED RELEASE ORAL 2 TIMES DAILY
Qty: 60 CAPSULE | Refills: 3 | Status: SHIPPED | OUTPATIENT
Start: 2024-08-20 | End: 2024-09-19

## 2024-08-20 NOTE — TELEPHONE ENCOUNTER
Patient contacted our office in need of refill for OMEPRAZOLE. Patient has a scheduled appointment on 9/3. Patients preferred pharmacy is [facility]. Pending for patients chart provider TREY RODGERS.

## 2024-08-29 DIAGNOSIS — Z79.899 ENCOUNTER FOR LONG-TERM (CURRENT) USE OF MEDICATIONS: ICD-10-CM

## 2024-08-29 DIAGNOSIS — Z11.59 SCREENING FOR VIRAL DISEASE: ICD-10-CM

## 2024-08-29 DIAGNOSIS — C15.9 ESOPHAGEAL ADENOCARCINOMA (HCC): Primary | ICD-10-CM

## 2024-08-30 ENCOUNTER — HOSPITAL ENCOUNTER (OUTPATIENT)
Dept: INFUSION THERAPY | Age: 59
Discharge: HOME | End: 2024-08-30
Payer: MEDICAID

## 2024-08-30 DIAGNOSIS — C15.9 ESOPHAGEAL ADENOCARCINOMA (HCC): ICD-10-CM

## 2024-08-30 DIAGNOSIS — Z11.59 SCREENING FOR VIRAL DISEASE: ICD-10-CM

## 2024-08-30 DIAGNOSIS — Z79.899 ENCOUNTER FOR LONG-TERM (CURRENT) USE OF MEDICATIONS: ICD-10-CM

## 2024-08-30 LAB
ALBUMIN SERPL-MCNC: 3.8 GM/DL (ref 3.4–5)
ALP BLD-CCNC: 79 IU/L (ref 40–129)
ALT SERPL-CCNC: 11 U/L (ref 10–40)
ANION GAP SERPL CALCULATED.3IONS-SCNC: 6 MMOL/L (ref 7–16)
AST SERPL-CCNC: 14 IU/L (ref 15–37)
BASOPHILS ABSOLUTE: 0 K/CU MM
BASOPHILS RELATIVE PERCENT: 0.7 % (ref 0–1)
BILIRUB SERPL-MCNC: 0.2 MG/DL (ref 0–1)
BUN SERPL-MCNC: 21 MG/DL (ref 6–23)
CALCIUM SERPL-MCNC: 9 MG/DL (ref 8.3–10.6)
CHLORIDE BLD-SCNC: 108 MMOL/L (ref 99–110)
CO2: 28 MMOL/L (ref 21–32)
CREAT SERPL-MCNC: 1.2 MG/DL (ref 0.6–1.1)
DIFFERENTIAL TYPE: ABNORMAL
EOSINOPHILS ABSOLUTE: 0.6 K/CU MM
EOSINOPHILS RELATIVE PERCENT: 10 % (ref 0–3)
GFR, ESTIMATED: 52 ML/MIN/1.73M2
GLUCOSE SERPL-MCNC: 90 MG/DL (ref 70–99)
HCT VFR BLD CALC: 39.3 % (ref 37–47)
HEMOGLOBIN: 12.4 GM/DL (ref 12.5–16)
LYMPHOCYTES ABSOLUTE: 1.4 K/CU MM
LYMPHOCYTES RELATIVE PERCENT: 24 % (ref 24–44)
MCH RBC QN AUTO: 28.2 PG (ref 27–31)
MCHC RBC AUTO-ENTMCNC: 31.6 % (ref 32–36)
MCV RBC AUTO: 89.3 FL (ref 78–100)
MONOCYTES ABSOLUTE: 0.5 K/CU MM
MONOCYTES RELATIVE PERCENT: 7.8 % (ref 0–4)
NEUTROPHILS ABSOLUTE: 3.4 K/CU MM
NEUTROPHILS RELATIVE PERCENT: 57.5 % (ref 36–66)
PDW BLD-RTO: 14.8 % (ref 11.7–14.9)
PLATELET # BLD: 229 K/CU MM (ref 140–440)
PMV BLD AUTO: 9.1 FL (ref 7.5–11.1)
POTASSIUM SERPL-SCNC: 4.7 MMOL/L (ref 3.5–5.1)
RBC # BLD: 4.4 M/CU MM (ref 4.2–5.4)
SODIUM BLD-SCNC: 142 MMOL/L (ref 135–145)
T4 FREE SERPL-MCNC: 1 NG/DL (ref 0.9–1.8)
TOTAL PROTEIN: 6.5 GM/DL (ref 6.4–8.2)
TSH SERPL DL<=0.005 MIU/L-ACNC: 5.2 UIU/ML (ref 0.27–4.2)
WBC # BLD: 5.9 K/CU MM (ref 4–10.5)

## 2024-08-30 PROCEDURE — 36415 COLL VENOUS BLD VENIPUNCTURE: CPT

## 2024-08-30 PROCEDURE — 80053 COMPREHEN METABOLIC PANEL: CPT

## 2024-08-30 PROCEDURE — 84443 ASSAY THYROID STIM HORMONE: CPT

## 2024-08-30 PROCEDURE — 84439 ASSAY OF FREE THYROXINE: CPT

## 2024-08-30 PROCEDURE — 85025 COMPLETE CBC W/AUTO DIFF WBC: CPT

## 2024-09-03 ENCOUNTER — HOSPITAL ENCOUNTER (OUTPATIENT)
Dept: INFUSION THERAPY | Age: 59
Discharge: HOME | End: 2024-09-03
Payer: MEDICAID

## 2024-09-03 ENCOUNTER — OFFICE VISIT (OUTPATIENT)
Dept: ONCOLOGY | Age: 59
End: 2024-09-03
Payer: MEDICAID

## 2024-09-03 VITALS
HEART RATE: 86 BPM | BODY MASS INDEX: 23.19 KG/M2 | WEIGHT: 153 LBS | DIASTOLIC BLOOD PRESSURE: 80 MMHG | HEIGHT: 68 IN | TEMPERATURE: 97.2 F | OXYGEN SATURATION: 94 % | SYSTOLIC BLOOD PRESSURE: 127 MMHG

## 2024-09-03 VITALS
HEIGHT: 68 IN | WEIGHT: 153 LBS | TEMPERATURE: 97.2 F | BODY MASS INDEX: 23.19 KG/M2 | HEART RATE: 86 BPM | SYSTOLIC BLOOD PRESSURE: 127 MMHG | DIASTOLIC BLOOD PRESSURE: 80 MMHG | OXYGEN SATURATION: 94 %

## 2024-09-03 DIAGNOSIS — R11.2 CHEMOTHERAPY INDUCED NAUSEA AND VOMITING: ICD-10-CM

## 2024-09-03 DIAGNOSIS — C15.9 ESOPHAGEAL ADENOCARCINOMA (HCC): Primary | ICD-10-CM

## 2024-09-03 DIAGNOSIS — Z11.59 SCREENING FOR VIRAL DISEASE: ICD-10-CM

## 2024-09-03 DIAGNOSIS — T45.1X5A CHEMOTHERAPY INDUCED NAUSEA AND VOMITING: ICD-10-CM

## 2024-09-03 DIAGNOSIS — Z79.899 ENCOUNTER FOR LONG-TERM (CURRENT) USE OF MEDICATIONS: Primary | ICD-10-CM

## 2024-09-03 DIAGNOSIS — C15.9 ESOPHAGEAL ADENOCARCINOMA (HCC): ICD-10-CM

## 2024-09-03 PROCEDURE — 1036F TOBACCO NON-USER: CPT | Performed by: INTERNAL MEDICINE

## 2024-09-03 PROCEDURE — 96413 CHEMO IV INFUSION 1 HR: CPT

## 2024-09-03 PROCEDURE — 99214 OFFICE O/P EST MOD 30 MIN: CPT | Performed by: INTERNAL MEDICINE

## 2024-09-03 PROCEDURE — G8427 DOCREV CUR MEDS BY ELIG CLIN: HCPCS | Performed by: INTERNAL MEDICINE

## 2024-09-03 PROCEDURE — 6360000002 HC RX W HCPCS: Performed by: INTERNAL MEDICINE

## 2024-09-03 PROCEDURE — 3017F COLORECTAL CA SCREEN DOC REV: CPT | Performed by: INTERNAL MEDICINE

## 2024-09-03 PROCEDURE — 2580000003 HC RX 258: Performed by: INTERNAL MEDICINE

## 2024-09-03 PROCEDURE — G8420 CALC BMI NORM PARAMETERS: HCPCS | Performed by: INTERNAL MEDICINE

## 2024-09-03 RX ORDER — AMMONIUM LACTATE 12 G/100G
CREAM TOPICAL
COMMUNITY
Start: 2024-08-19

## 2024-09-03 RX ORDER — FAMOTIDINE 10 MG/ML
20 INJECTION, SOLUTION INTRAVENOUS
Status: CANCELLED | OUTPATIENT
Start: 2024-09-03

## 2024-09-03 RX ORDER — DOXEPIN HYDROCHLORIDE 25 MG/1
25 CAPSULE ORAL DAILY
COMMUNITY

## 2024-09-03 RX ORDER — TRIAMCINOLONE ACETONIDE 1 MG/G
CREAM TOPICAL
COMMUNITY
Start: 2024-08-19

## 2024-09-03 RX ORDER — SODIUM CHLORIDE 9 MG/ML
5-250 INJECTION, SOLUTION INTRAVENOUS PRN
Status: CANCELLED | OUTPATIENT
Start: 2024-09-03

## 2024-09-03 RX ORDER — ONDANSETRON 2 MG/ML
8 INJECTION INTRAMUSCULAR; INTRAVENOUS
Status: CANCELLED | OUTPATIENT
Start: 2024-09-03

## 2024-09-03 RX ORDER — ACETAMINOPHEN 325 MG/1
650 TABLET ORAL
Status: CANCELLED | OUTPATIENT
Start: 2024-09-03

## 2024-09-03 RX ORDER — SODIUM CHLORIDE 9 MG/ML
INJECTION, SOLUTION INTRAVENOUS CONTINUOUS
Status: CANCELLED | OUTPATIENT
Start: 2024-09-03

## 2024-09-03 RX ORDER — MEPERIDINE HYDROCHLORIDE 50 MG/ML
12.5 INJECTION INTRAMUSCULAR; INTRAVENOUS; SUBCUTANEOUS PRN
Status: CANCELLED | OUTPATIENT
Start: 2024-09-03

## 2024-09-03 RX ORDER — SODIUM CHLORIDE 0.9 % (FLUSH) 0.9 %
5-40 SYRINGE (ML) INJECTION PRN
Status: CANCELLED | OUTPATIENT
Start: 2024-09-03

## 2024-09-03 RX ORDER — EPINEPHRINE 1 MG/ML
0.3 INJECTION, SOLUTION, CONCENTRATE INTRAVENOUS PRN
Status: CANCELLED | OUTPATIENT
Start: 2024-09-03

## 2024-09-03 RX ORDER — HEPARIN SODIUM (PORCINE) LOCK FLUSH IV SOLN 100 UNIT/ML 100 UNIT/ML
500 SOLUTION INTRAVENOUS PRN
Status: CANCELLED | OUTPATIENT
Start: 2024-09-03

## 2024-09-03 RX ORDER — ALBUTEROL SULFATE 90 UG/1
4 AEROSOL, METERED RESPIRATORY (INHALATION) PRN
Status: CANCELLED | OUTPATIENT
Start: 2024-09-03

## 2024-09-03 RX ORDER — SODIUM CHLORIDE 9 MG/ML
5-250 INJECTION, SOLUTION INTRAVENOUS PRN
Status: DISCONTINUED | OUTPATIENT
Start: 2024-09-03 | End: 2024-09-04 | Stop reason: HOSPADM

## 2024-09-03 RX ORDER — DIPHENHYDRAMINE HYDROCHLORIDE 50 MG/ML
50 INJECTION INTRAMUSCULAR; INTRAVENOUS
Status: CANCELLED | OUTPATIENT
Start: 2024-09-03

## 2024-09-03 RX ADMIN — SODIUM CHLORIDE 200 MG: 9 INJECTION, SOLUTION INTRAVENOUS at 10:10

## 2024-09-03 ASSESSMENT — PAIN DESCRIPTION - LOCATION: LOCATION: OTHER (COMMENT)

## 2024-09-03 ASSESSMENT — PAIN SCALES - GENERAL: PAINLEVEL_OUTOF10: 5

## 2024-09-03 NOTE — PROGRESS NOTES
Pt here for Keytruda. No new issues to report to doctor. Labs and vitals within treatment range (TSH 5.200, T4 1.00, reported to Dr. RODGERS).   Port accessed with sterile technique, brisk blood reutrn noted. Orders released and given. Pt tolerated well, left with  and dc paperwork.

## 2024-09-03 NOTE — PROGRESS NOTES
MA Rooming Questions  Patient: Denisa Basurto  MRN: [id number]    Date: 9/3/2024        1. Do you have any new issues?   no         2. Do you need any refills on medications?    no    3. Have you had any imaging done since your last visit?   no    4. Have you been hospitalized or seen in the emergency room since your last visit here?   no    5. Did the patient have a depression screening completed today? No    No data recorded     PHQ-9 Given to (if applicable):               PHQ-9 Score (if applicable):                     [] Positive     []  Negative              Does question #9 need addressed (if applicable)                     [] Yes    []  No               Carmen Mccartney MA      
Hypermetabolic cavitary nodule in the upper lobe of the left lung consistent with a primary bronchogenic carcinoma. Hypermetabolic soft tissue density nodule in the upper lobe of the left lung. This could be primary or metastatic disease. Reactive axillary lymph nodes, bilaterally.    On September 3, 2024, she presented to me for follow up. 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    She is here for close monitoring of toxicity and side effects from immunotherapy. She is s/p 10th cycle of chemo and she is tolerating current chemo well. She doesn't encounter any major side effects from immunotherapy. I recommend to continue with immunotherapy and I will give her 11th cycle of immunotherapy today.     Reviewed labs done on 8/30/24. Her TSH was 5.2 on 8/30/24. T4 was normal. Will monitor TSH with reflex every 6 weekly.     Reviewed findings on PET/CT done on 7/31/24. She is achieving very good response to pembrolizumab. I agree with radiologist and I believe her axillary and inguinal lymphadenopathy are reactive, but will pay attention on repeat scans.     She has decided to continue with immunotherapy and prefers to delay surgery as well as SBRT for lung low grade NET.      D/W oncologist at [facility] Dr. Hernandez before. Will plan for laparoscopic evaluation after neoadjuvant immunotherapy before surgery.     Keytruda induced arthralgia/arthritis - since her pain hasn't controlled well, I will increase oxycodone liquid to 10 mg Q4 hourly (she was on 5 mg Q4 prn before). I asked her to take it prn. I will ask her to continue with naproxen 250 mg BID prn.     Constipation - narcotics induced. Will start senna liquid form.     Immunotherapy induced itchiness - she is on zyrtec and hydroxyzine 25 mg Q8 prn. Will refer her to dermatology for further evaluation.     Smoking cessation - she is on nicotine patch. I gave another

## 2024-09-20 DIAGNOSIS — C15.9 ESOPHAGEAL ADENOCARCINOMA (HCC): Primary | ICD-10-CM

## 2024-09-20 DIAGNOSIS — Z11.59 SCREENING FOR VIRAL DISEASE: ICD-10-CM

## 2024-09-20 DIAGNOSIS — Z79.899 ENCOUNTER FOR LONG-TERM (CURRENT) USE OF MEDICATIONS: ICD-10-CM

## 2024-09-23 ENCOUNTER — HOSPITAL ENCOUNTER (OUTPATIENT)
Dept: INFUSION THERAPY | Age: 59
Discharge: HOME | End: 2024-09-23
Payer: MEDICAID

## 2024-09-23 DIAGNOSIS — C15.9 ESOPHAGEAL ADENOCARCINOMA (HCC): ICD-10-CM

## 2024-09-23 DIAGNOSIS — Z11.59 SCREENING FOR VIRAL DISEASE: ICD-10-CM

## 2024-09-23 DIAGNOSIS — Z79.899 ENCOUNTER FOR LONG-TERM (CURRENT) USE OF MEDICATIONS: ICD-10-CM

## 2024-09-23 LAB
ALBUMIN SERPL-MCNC: 3.9 G/DL (ref 3.4–5)
ALBUMIN/GLOB SERPL: 1.7 {RATIO} (ref 1.1–2.2)
ALP SERPL-CCNC: 81 U/L (ref 40–129)
ALT SERPL-CCNC: 14 U/L (ref 10–40)
ANION GAP SERPL CALCULATED.3IONS-SCNC: 11 MMOL/L (ref 9–17)
AST SERPL-CCNC: 19 U/L (ref 15–37)
BASOPHILS # BLD: 0.02 K/UL
BASOPHILS NFR BLD: 0 % (ref 0–1)
BILIRUB SERPL-MCNC: 0.3 MG/DL (ref 0–1)
BUN SERPL-MCNC: 24 MG/DL (ref 7–20)
CALCIUM SERPL-MCNC: 9.3 MG/DL (ref 8.3–10.6)
CHLORIDE SERPL-SCNC: 104 MMOL/L (ref 99–110)
CO2 SERPL-SCNC: 27 MMOL/L (ref 21–32)
CREAT SERPL-MCNC: 1.1 MG/DL (ref 0.6–1.1)
EOSINOPHIL # BLD: 0.54 K/UL
EOSINOPHILS RELATIVE PERCENT: 8 % (ref 0–3)
ERYTHROCYTE [DISTWIDTH] IN BLOOD BY AUTOMATED COUNT: 14.5 % (ref 11.7–14.9)
GFR, ESTIMATED: 51 ML/MIN/1.73M2
GLUCOSE SERPL-MCNC: 106 MG/DL (ref 74–99)
HCT VFR BLD AUTO: 40.4 % (ref 37–47)
HGB BLD-MCNC: 12.8 G/DL (ref 12.5–16)
LYMPHOCYTES NFR BLD: 1.44 K/UL
LYMPHOCYTES RELATIVE PERCENT: 21 % (ref 24–44)
MCH RBC QN AUTO: 29 PG (ref 27–31)
MCHC RBC AUTO-ENTMCNC: 31.7 G/DL (ref 32–36)
MCV RBC AUTO: 91.6 FL (ref 78–100)
MONOCYTES NFR BLD: 0.47 K/UL
MONOCYTES NFR BLD: 7 % (ref 0–4)
NEUTROPHILS NFR BLD: 64 % (ref 36–66)
NEUTS SEG NFR BLD: 4.31 K/UL
PLATELET # BLD AUTO: 199 K/UL (ref 140–440)
PMV BLD AUTO: 9.2 FL (ref 7.5–11.1)
POTASSIUM SERPL-SCNC: 4 MMOL/L (ref 3.5–5.1)
PROT SERPL-MCNC: 6.3 G/DL (ref 6.4–8.2)
RBC # BLD AUTO: 4.41 M/UL (ref 4.2–5.4)
SODIUM SERPL-SCNC: 141 MMOL/L (ref 136–145)
WBC OTHER # BLD: 6.8 K/UL (ref 4–10.5)

## 2024-09-23 PROCEDURE — 80053 COMPREHEN METABOLIC PANEL: CPT

## 2024-09-23 PROCEDURE — 36415 COLL VENOUS BLD VENIPUNCTURE: CPT

## 2024-09-23 PROCEDURE — 85025 COMPLETE CBC W/AUTO DIFF WBC: CPT

## 2024-09-24 ENCOUNTER — HOSPITAL ENCOUNTER (OUTPATIENT)
Dept: INFUSION THERAPY | Age: 59
Discharge: HOME | End: 2024-09-24
Payer: MEDICAID

## 2024-09-24 VITALS
DIASTOLIC BLOOD PRESSURE: 73 MMHG | BODY MASS INDEX: 23.58 KG/M2 | WEIGHT: 155.6 LBS | SYSTOLIC BLOOD PRESSURE: 130 MMHG | HEIGHT: 68 IN | TEMPERATURE: 97.7 F | HEART RATE: 88 BPM | OXYGEN SATURATION: 98 %

## 2024-09-24 DIAGNOSIS — Z79.899 ENCOUNTER FOR LONG-TERM (CURRENT) USE OF MEDICATIONS: Primary | ICD-10-CM

## 2024-09-24 DIAGNOSIS — C15.9 ESOPHAGEAL ADENOCARCINOMA (HCC): Primary | ICD-10-CM

## 2024-09-24 DIAGNOSIS — Z11.59 SCREENING FOR VIRAL DISEASE: ICD-10-CM

## 2024-09-24 DIAGNOSIS — Z79.899 ENCOUNTER FOR LONG-TERM (CURRENT) USE OF MEDICATIONS: ICD-10-CM

## 2024-09-24 DIAGNOSIS — C15.9 ESOPHAGEAL ADENOCARCINOMA (HCC): ICD-10-CM

## 2024-09-24 PROCEDURE — 2580000003 HC RX 258: Performed by: INTERNAL MEDICINE

## 2024-09-24 PROCEDURE — 6360000002 HC RX W HCPCS: Performed by: INTERNAL MEDICINE

## 2024-09-24 PROCEDURE — 96413 CHEMO IV INFUSION 1 HR: CPT

## 2024-09-24 RX ORDER — ACETAMINOPHEN 325 MG/1
650 TABLET ORAL
Status: CANCELLED | OUTPATIENT
Start: 2024-09-24

## 2024-09-24 RX ORDER — DIPHENHYDRAMINE HYDROCHLORIDE 50 MG/ML
50 INJECTION INTRAMUSCULAR; INTRAVENOUS
OUTPATIENT
Start: 2024-11-26

## 2024-09-24 RX ORDER — DIPHENHYDRAMINE HYDROCHLORIDE 50 MG/ML
50 INJECTION INTRAMUSCULAR; INTRAVENOUS
Status: CANCELLED | OUTPATIENT
Start: 2024-09-24

## 2024-09-24 RX ORDER — ONDANSETRON 2 MG/ML
8 INJECTION INTRAMUSCULAR; INTRAVENOUS
OUTPATIENT
Start: 2024-10-15

## 2024-09-24 RX ORDER — FAMOTIDINE 10 MG/ML
20 INJECTION, SOLUTION INTRAVENOUS
OUTPATIENT
Start: 2024-10-15

## 2024-09-24 RX ORDER — HEPARIN SODIUM (PORCINE) LOCK FLUSH IV SOLN 100 UNIT/ML 100 UNIT/ML
500 SOLUTION INTRAVENOUS PRN
OUTPATIENT
Start: 2024-11-26

## 2024-09-24 RX ORDER — DIPHENHYDRAMINE HYDROCHLORIDE 50 MG/ML
50 INJECTION INTRAMUSCULAR; INTRAVENOUS
OUTPATIENT
Start: 2024-11-05

## 2024-09-24 RX ORDER — MEPERIDINE HYDROCHLORIDE 50 MG/ML
12.5 INJECTION INTRAMUSCULAR; INTRAVENOUS; SUBCUTANEOUS PRN
Status: CANCELLED | OUTPATIENT
Start: 2024-09-24

## 2024-09-24 RX ORDER — SODIUM CHLORIDE 9 MG/ML
5-250 INJECTION, SOLUTION INTRAVENOUS PRN
Status: CANCELLED | OUTPATIENT
Start: 2024-09-24

## 2024-09-24 RX ORDER — ONDANSETRON 2 MG/ML
8 INJECTION INTRAMUSCULAR; INTRAVENOUS
OUTPATIENT
Start: 2024-11-26

## 2024-09-24 RX ORDER — ACETAMINOPHEN 325 MG/1
650 TABLET ORAL
OUTPATIENT
Start: 2024-11-05

## 2024-09-24 RX ORDER — SODIUM CHLORIDE 0.9 % (FLUSH) 0.9 %
5-40 SYRINGE (ML) INJECTION PRN
Status: DISCONTINUED | OUTPATIENT
Start: 2024-09-24 | End: 2024-09-25 | Stop reason: HOSPADM

## 2024-09-24 RX ORDER — EPINEPHRINE 1 MG/ML
0.3 INJECTION, SOLUTION, CONCENTRATE INTRAVENOUS PRN
OUTPATIENT
Start: 2024-11-26

## 2024-09-24 RX ORDER — ACETAMINOPHEN 325 MG/1
650 TABLET ORAL
OUTPATIENT
Start: 2024-11-26

## 2024-09-24 RX ORDER — FAMOTIDINE 10 MG/ML
20 INJECTION, SOLUTION INTRAVENOUS
OUTPATIENT
Start: 2024-11-05

## 2024-09-24 RX ORDER — SODIUM CHLORIDE 0.9 % (FLUSH) 0.9 %
5-40 SYRINGE (ML) INJECTION PRN
OUTPATIENT
Start: 2024-11-26

## 2024-09-24 RX ORDER — SODIUM CHLORIDE 0.9 % (FLUSH) 0.9 %
5-40 SYRINGE (ML) INJECTION PRN
OUTPATIENT
Start: 2024-11-05

## 2024-09-24 RX ORDER — SODIUM CHLORIDE 9 MG/ML
5-250 INJECTION, SOLUTION INTRAVENOUS PRN
OUTPATIENT
Start: 2024-11-05

## 2024-09-24 RX ORDER — ALBUTEROL SULFATE 90 UG/1
4 INHALANT RESPIRATORY (INHALATION) PRN
Status: CANCELLED | OUTPATIENT
Start: 2024-09-24

## 2024-09-24 RX ORDER — ONDANSETRON 2 MG/ML
8 INJECTION INTRAMUSCULAR; INTRAVENOUS
OUTPATIENT
Start: 2024-11-05

## 2024-09-24 RX ORDER — MEPERIDINE HYDROCHLORIDE 50 MG/ML
12.5 INJECTION INTRAMUSCULAR; INTRAVENOUS; SUBCUTANEOUS PRN
OUTPATIENT
Start: 2024-11-26

## 2024-09-24 RX ORDER — ALBUTEROL SULFATE 90 UG/1
4 INHALANT RESPIRATORY (INHALATION) PRN
OUTPATIENT
Start: 2024-11-05

## 2024-09-24 RX ORDER — EPINEPHRINE 1 MG/ML
0.3 INJECTION, SOLUTION, CONCENTRATE INTRAVENOUS PRN
Status: CANCELLED | OUTPATIENT
Start: 2024-09-24

## 2024-09-24 RX ORDER — EPINEPHRINE 1 MG/ML
0.3 INJECTION, SOLUTION, CONCENTRATE INTRAVENOUS PRN
OUTPATIENT
Start: 2024-11-05

## 2024-09-24 RX ORDER — SODIUM CHLORIDE 9 MG/ML
INJECTION, SOLUTION INTRAVENOUS CONTINUOUS
OUTPATIENT
Start: 2024-10-15

## 2024-09-24 RX ORDER — SODIUM CHLORIDE 9 MG/ML
INJECTION, SOLUTION INTRAVENOUS CONTINUOUS
Status: CANCELLED | OUTPATIENT
Start: 2024-09-24

## 2024-09-24 RX ORDER — SODIUM CHLORIDE 9 MG/ML
5-250 INJECTION, SOLUTION INTRAVENOUS PRN
OUTPATIENT
Start: 2024-11-26

## 2024-09-24 RX ORDER — MEPERIDINE HYDROCHLORIDE 50 MG/ML
12.5 INJECTION INTRAMUSCULAR; INTRAVENOUS; SUBCUTANEOUS PRN
OUTPATIENT
Start: 2024-10-15

## 2024-09-24 RX ORDER — FAMOTIDINE 10 MG/ML
20 INJECTION, SOLUTION INTRAVENOUS
Status: CANCELLED | OUTPATIENT
Start: 2024-09-24

## 2024-09-24 RX ORDER — DIPHENHYDRAMINE HYDROCHLORIDE 50 MG/ML
50 INJECTION INTRAMUSCULAR; INTRAVENOUS
OUTPATIENT
Start: 2024-10-15

## 2024-09-24 RX ORDER — SODIUM CHLORIDE 9 MG/ML
INJECTION, SOLUTION INTRAVENOUS CONTINUOUS
OUTPATIENT
Start: 2024-11-26

## 2024-09-24 RX ORDER — SODIUM CHLORIDE 0.9 % (FLUSH) 0.9 %
5-40 SYRINGE (ML) INJECTION PRN
Status: CANCELLED | OUTPATIENT
Start: 2024-09-24

## 2024-09-24 RX ORDER — SODIUM CHLORIDE 0.9 % (FLUSH) 0.9 %
5-40 SYRINGE (ML) INJECTION PRN
OUTPATIENT
Start: 2024-10-15

## 2024-09-24 RX ORDER — HEPARIN SODIUM (PORCINE) LOCK FLUSH IV SOLN 100 UNIT/ML 100 UNIT/ML
500 SOLUTION INTRAVENOUS PRN
OUTPATIENT
Start: 2024-11-05

## 2024-09-24 RX ORDER — ONDANSETRON 2 MG/ML
8 INJECTION INTRAMUSCULAR; INTRAVENOUS
Status: CANCELLED | OUTPATIENT
Start: 2024-09-24

## 2024-09-24 RX ORDER — HEPARIN SODIUM (PORCINE) LOCK FLUSH IV SOLN 100 UNIT/ML 100 UNIT/ML
500 SOLUTION INTRAVENOUS PRN
OUTPATIENT
Start: 2024-10-15

## 2024-09-24 RX ORDER — SODIUM CHLORIDE 9 MG/ML
5-250 INJECTION, SOLUTION INTRAVENOUS PRN
OUTPATIENT
Start: 2024-10-15

## 2024-09-24 RX ORDER — ACETAMINOPHEN 325 MG/1
650 TABLET ORAL
OUTPATIENT
Start: 2024-10-15

## 2024-09-24 RX ORDER — FAMOTIDINE 10 MG/ML
20 INJECTION, SOLUTION INTRAVENOUS
OUTPATIENT
Start: 2024-11-26

## 2024-09-24 RX ORDER — ALBUTEROL SULFATE 90 UG/1
4 INHALANT RESPIRATORY (INHALATION) PRN
OUTPATIENT
Start: 2024-10-15

## 2024-09-24 RX ORDER — MEPERIDINE HYDROCHLORIDE 50 MG/ML
12.5 INJECTION INTRAMUSCULAR; INTRAVENOUS; SUBCUTANEOUS PRN
OUTPATIENT
Start: 2024-11-05

## 2024-09-24 RX ORDER — EPINEPHRINE 1 MG/ML
0.3 INJECTION, SOLUTION, CONCENTRATE INTRAVENOUS PRN
OUTPATIENT
Start: 2024-10-15

## 2024-09-24 RX ORDER — ALBUTEROL SULFATE 90 UG/1
4 INHALANT RESPIRATORY (INHALATION) PRN
OUTPATIENT
Start: 2024-11-26

## 2024-09-24 RX ORDER — SODIUM CHLORIDE 9 MG/ML
INJECTION, SOLUTION INTRAVENOUS CONTINUOUS
OUTPATIENT
Start: 2024-11-05

## 2024-09-24 RX ORDER — HEPARIN SODIUM (PORCINE) LOCK FLUSH IV SOLN 100 UNIT/ML 100 UNIT/ML
500 SOLUTION INTRAVENOUS PRN
Status: CANCELLED | OUTPATIENT
Start: 2024-09-24

## 2024-09-24 RX ADMIN — SODIUM CHLORIDE 200 MG: 9 INJECTION, SOLUTION INTRAVENOUS at 11:00

## 2024-09-24 RX ADMIN — SODIUM CHLORIDE, PRESERVATIVE FREE 20 ML: 5 INJECTION INTRAVENOUS at 11:37

## 2024-09-24 ASSESSMENT — PAIN SCALES - GENERAL: PAINLEVEL_OUTOF10: 6

## 2024-09-24 ASSESSMENT — PAIN DESCRIPTION - LOCATION: LOCATION: GENERALIZED

## 2024-09-24 NOTE — PROGRESS NOTES
Ambulated to infusion area, here today for immunotherapy. Patient reports some nausea that comes and goes. No toehr concerns. Right chest mediport accessed, positive blood return noted. Labs reviewed, Labs within defined limits.  Immunotherapy administered as ordered. Call light within reach. Tolerated infusion without incident.  Discharge instructions declined. RTC 10/14 for labs.     Status appropriately assessed and documented. All required labs and results reviewed. Treatment approved by provider. Treatment orders and medications verified by 2 Registered Nurses where applicable. Treatment plan was confirmed with patient prior to administration, and educated the need to report any treatment-related symptoms

## 2024-10-14 ENCOUNTER — HOSPITAL ENCOUNTER (OUTPATIENT)
Dept: INFUSION THERAPY | Age: 59
Discharge: HOME | End: 2024-10-14
Payer: MEDICAID

## 2024-10-14 DIAGNOSIS — Z79.899 ENCOUNTER FOR LONG-TERM (CURRENT) USE OF MEDICATIONS: ICD-10-CM

## 2024-10-14 DIAGNOSIS — Z11.59 SCREENING FOR VIRAL DISEASE: ICD-10-CM

## 2024-10-14 DIAGNOSIS — C15.9 ESOPHAGEAL ADENOCARCINOMA (HCC): ICD-10-CM

## 2024-10-14 LAB
ALBUMIN SERPL-MCNC: 3.8 G/DL (ref 3.4–5)
ALBUMIN/GLOB SERPL: 1.5 {RATIO} (ref 1.1–2.2)
ALP SERPL-CCNC: 89 U/L (ref 40–129)
ALT SERPL-CCNC: 15 U/L (ref 10–40)
ANION GAP SERPL CALCULATED.3IONS-SCNC: 12 MMOL/L (ref 9–17)
AST SERPL-CCNC: 17 U/L (ref 15–37)
BASOPHILS # BLD: 0.04 K/UL
BASOPHILS NFR BLD: 1 % (ref 0–1)
BILIRUB SERPL-MCNC: 0.2 MG/DL (ref 0–1)
BUN SERPL-MCNC: 34 MG/DL (ref 7–20)
CALCIUM SERPL-MCNC: 9.1 MG/DL (ref 8.3–10.6)
CHLORIDE SERPL-SCNC: 106 MMOL/L (ref 99–110)
CO2 SERPL-SCNC: 25 MMOL/L (ref 21–32)
CREAT SERPL-MCNC: 1 MG/DL (ref 0.6–1.1)
EOSINOPHIL # BLD: 0.58 K/UL
EOSINOPHILS RELATIVE PERCENT: 11 % (ref 0–3)
ERYTHROCYTE [DISTWIDTH] IN BLOOD BY AUTOMATED COUNT: 15.1 % (ref 11.7–14.9)
GFR, ESTIMATED: 58 ML/MIN/1.73M2
GLUCOSE SERPL-MCNC: 92 MG/DL (ref 74–99)
HCT VFR BLD AUTO: 39.7 % (ref 37–47)
HGB BLD-MCNC: 12.7 G/DL (ref 12.5–16)
LYMPHOCYTES NFR BLD: 1.38 K/UL
LYMPHOCYTES RELATIVE PERCENT: 25 % (ref 24–44)
MCH RBC QN AUTO: 29.2 PG (ref 27–31)
MCHC RBC AUTO-ENTMCNC: 32 G/DL (ref 32–36)
MCV RBC AUTO: 91.3 FL (ref 78–100)
MONOCYTES NFR BLD: 0.34 K/UL
MONOCYTES NFR BLD: 6 % (ref 0–4)
NEUTROPHILS NFR BLD: 57 % (ref 36–66)
NEUTS SEG NFR BLD: 3.14 K/UL
PLATELET # BLD AUTO: 239 K/UL (ref 140–440)
PMV BLD AUTO: 9.1 FL (ref 7.5–11.1)
POTASSIUM SERPL-SCNC: 4.4 MMOL/L (ref 3.5–5.1)
PROT SERPL-MCNC: 6.3 G/DL (ref 6.4–8.2)
RBC # BLD AUTO: 4.35 M/UL (ref 4.2–5.4)
SODIUM SERPL-SCNC: 143 MMOL/L (ref 136–145)
TSH SERPL DL<=0.05 MIU/L-ACNC: 2.58 UIU/ML (ref 0.27–4.2)
WBC OTHER # BLD: 5.5 K/UL (ref 4–10.5)

## 2024-10-14 PROCEDURE — 84443 ASSAY THYROID STIM HORMONE: CPT

## 2024-10-14 PROCEDURE — 80053 COMPREHEN METABOLIC PANEL: CPT

## 2024-10-14 PROCEDURE — 36415 COLL VENOUS BLD VENIPUNCTURE: CPT

## 2024-10-14 PROCEDURE — 85025 COMPLETE CBC W/AUTO DIFF WBC: CPT

## 2024-10-15 ENCOUNTER — OFFICE VISIT (OUTPATIENT)
Dept: ONCOLOGY | Age: 59
End: 2024-10-15
Payer: MEDICAID

## 2024-10-15 ENCOUNTER — HOSPITAL ENCOUNTER (OUTPATIENT)
Dept: INFUSION THERAPY | Age: 59
Discharge: HOME | End: 2024-10-15
Payer: MEDICAID

## 2024-10-15 VITALS
RESPIRATION RATE: 18 BRPM | DIASTOLIC BLOOD PRESSURE: 81 MMHG | WEIGHT: 162 LBS | BODY MASS INDEX: 24.55 KG/M2 | HEIGHT: 68 IN | TEMPERATURE: 97 F | SYSTOLIC BLOOD PRESSURE: 141 MMHG | HEART RATE: 62 BPM | OXYGEN SATURATION: 99 %

## 2024-10-15 VITALS
HEIGHT: 68 IN | DIASTOLIC BLOOD PRESSURE: 81 MMHG | OXYGEN SATURATION: 99 % | SYSTOLIC BLOOD PRESSURE: 141 MMHG | TEMPERATURE: 97 F | BODY MASS INDEX: 24.58 KG/M2 | HEART RATE: 62 BPM | WEIGHT: 162.2 LBS

## 2024-10-15 DIAGNOSIS — C15.9 ESOPHAGEAL ADENOCARCINOMA (HCC): ICD-10-CM

## 2024-10-15 DIAGNOSIS — Z11.59 SCREENING FOR VIRAL DISEASE: ICD-10-CM

## 2024-10-15 DIAGNOSIS — C15.9 ESOPHAGEAL ADENOCARCINOMA (HCC): Primary | ICD-10-CM

## 2024-10-15 DIAGNOSIS — Z79.899 ENCOUNTER FOR LONG-TERM (CURRENT) USE OF MEDICATIONS: Primary | ICD-10-CM

## 2024-10-15 PROCEDURE — 3017F COLORECTAL CA SCREEN DOC REV: CPT | Performed by: INTERNAL MEDICINE

## 2024-10-15 PROCEDURE — 99214 OFFICE O/P EST MOD 30 MIN: CPT | Performed by: INTERNAL MEDICINE

## 2024-10-15 PROCEDURE — 2580000003 HC RX 258: Performed by: INTERNAL MEDICINE

## 2024-10-15 PROCEDURE — 1036F TOBACCO NON-USER: CPT | Performed by: INTERNAL MEDICINE

## 2024-10-15 PROCEDURE — 6360000002 HC RX W HCPCS: Performed by: INTERNAL MEDICINE

## 2024-10-15 PROCEDURE — G8420 CALC BMI NORM PARAMETERS: HCPCS | Performed by: INTERNAL MEDICINE

## 2024-10-15 PROCEDURE — 96413 CHEMO IV INFUSION 1 HR: CPT

## 2024-10-15 PROCEDURE — G8484 FLU IMMUNIZE NO ADMIN: HCPCS | Performed by: INTERNAL MEDICINE

## 2024-10-15 PROCEDURE — G8427 DOCREV CUR MEDS BY ELIG CLIN: HCPCS | Performed by: INTERNAL MEDICINE

## 2024-10-15 RX ORDER — SODIUM CHLORIDE 0.9 % (FLUSH) 0.9 %
5-40 SYRINGE (ML) INJECTION PRN
Status: DISCONTINUED | OUTPATIENT
Start: 2024-10-15 | End: 2024-10-16 | Stop reason: HOSPADM

## 2024-10-15 RX ADMIN — SODIUM CHLORIDE, PRESERVATIVE FREE 20 ML: 5 INJECTION INTRAVENOUS at 11:13

## 2024-10-15 RX ADMIN — SODIUM CHLORIDE 200 MG: 9 INJECTION, SOLUTION INTRAVENOUS at 10:36

## 2024-10-15 ASSESSMENT — PAIN SCALES - GENERAL: PAINLEVEL_OUTOF10: 4

## 2024-10-15 NOTE — PROGRESS NOTES
MA Rooming Questions  Patient: Denisa Basurto  MRN: [id number]    Date: 10/15/2024        1. Do you have any new issues?   no         2. Do you need any refills on medications?    no    3. Have you had any imaging done since your last visit?   no    4. Have you been hospitalized or seen in the emergency room since your last visit here?   no    5. Did the patient have a depression screening completed today? No    No data recorded     PHQ-9 Given to (if applicable):               PHQ-9 Score (if applicable):                     [] Positive     []  Negative              Does question #9 need addressed (if applicable)                     [] Yes    []  No               Nat Parekh MA      
2/1/24. He scheduled for CT guided biopsy of second lung nodule (to r/o second lung primary or metastasis form GE junction adenocarcinoma) and it is scheduled to be done on 2/12/24.     I requested MSI which came back unstable. PD-L1 for Keytruda - expressed (CPS >10) and Her2 negative (score 1+).     PET/CT scan on 1/27/24 showed Intensely hypermetabolic distal esophageal mass extending to the gastric cardia and fundus, compatible with history of gastric adenocarcinoma. Two hypermetabolic left upper lobe pulmonary nodules, one of which is cavitary, most concerning for pulmonary metastases. Low-density nodularity of bilateral adrenal glands, favoring adrenal adenomas.    Since she has MSI unstable disease, I started neoadjuvant chemotherapy with pembrolizumab since 2/6/2024.     CT chest, abdomen, pelvis done on 4/23/24 showed a neoplastic mass involving the gastroesophageal junction and cardia of the stomach has decreased in size and now measures approximately 5.9 cm compared to 7.1 cm previously consistent with positive treatment response. A 1.9 cm left upper lobe cavitary nodule and a 2.4 cm left upper lobe solid nodule are stable since 1/27/2024 and could represent primary or metastatic malignancy.  Severe emphysema. Mild symmetric bilateral axillary lymphadenopathy with mildly enlarged axillary lymph nodes with fatty ab. These likely represent reactive lymph nodes. No evidence metastatic disease in the abdomen or pelvis. Mild symmetric bilateral inguinal lymphadenopathy is new since prior PET/CT but similar to the axillary lymphadenopathy is favored to represent nonspecific reactive lymph nodes. Continued follow-up is recommended. Stable right greater than left adrenal adenomas.    PET/CT on 7/31/24 showed focus of mural hypermetabolic activity, at the GE junction, consistent with residual/recurrent adenocarcinoma. Hypermetabolic cavitary nodule in the upper lobe of the left lung consistent with a primary 
Unknown if ever smoked

## 2024-10-15 NOTE — PROGRESS NOTES
Ambulated to infusion area, here today for chemotherapy. No concerns at this time. Right chest mediport accessed, positive blood return noted. Labs reviewed, Labs within defined limits.  Chemo administered as ordered. Call light within reach. Tolerated infusion without incident. Pt declined Discharge instructions provided. RTC 11/4 Labs     Status appropriately assessed and documented. All required labs and results reviewed. Treatment approved by provider. Treatment orders and medications verified by 2 Registered Nurses where applicable. Treatment plan was confirmed with patient prior to administration, and educated the need to report any treatment-related symptoms

## 2024-11-01 ENCOUNTER — TELEPHONE (OUTPATIENT)
Dept: ONCOLOGY | Age: 59
End: 2024-11-01

## 2024-11-01 NOTE — TELEPHONE ENCOUNTER
Paxlovid renal dosing e-scribed to [facility] on [address] per physician order. This nurse called the patient and advised of the prescription, the patient was instructed to notify this nurse if the pharmacy does not have it in stock.

## 2024-11-01 NOTE — TELEPHONE ENCOUNTER
Pt tested COVID+ today, symptoms started yesterday evening, and inquires about Paxlovid. Is she able to take it? Will we prescribe it?

## 2024-11-05 RX ORDER — MELOXICAM 15 MG/1
15 TABLET ORAL DAILY
Qty: 30 TABLET | Refills: 3 | Status: SHIPPED | OUTPATIENT
Start: 2024-11-05

## 2024-11-05 RX ORDER — SENNOSIDES 8.8 MG/5ML
8.8 LIQUID ORAL 3 TIMES DAILY PRN
Qty: 450 ML | Refills: 1 | Status: SHIPPED | OUTPATIENT
Start: 2024-11-05 | End: 2024-12-05

## 2024-11-11 ENCOUNTER — HOSPITAL ENCOUNTER (OUTPATIENT)
Dept: INFUSION THERAPY | Age: 59
Discharge: HOME | End: 2024-11-11
Payer: MEDICAID

## 2024-11-11 DIAGNOSIS — Z79.899 ENCOUNTER FOR LONG-TERM (CURRENT) USE OF MEDICATIONS: ICD-10-CM

## 2024-11-11 DIAGNOSIS — Z11.59 SCREENING FOR VIRAL DISEASE: ICD-10-CM

## 2024-11-11 DIAGNOSIS — C15.9 ESOPHAGEAL ADENOCARCINOMA (HCC): ICD-10-CM

## 2024-11-11 LAB
ALBUMIN SERPL-MCNC: 3.8 G/DL (ref 3.4–5)
ALBUMIN/GLOB SERPL: 1.5 {RATIO} (ref 1.1–2.2)
ALP SERPL-CCNC: 81 U/L (ref 40–129)
ALT SERPL-CCNC: 14 U/L (ref 10–40)
ANION GAP SERPL CALCULATED.3IONS-SCNC: 12 MMOL/L (ref 9–17)
AST SERPL-CCNC: 15 U/L (ref 15–37)
BASOPHILS # BLD: 0.04 K/UL
BASOPHILS NFR BLD: 1 % (ref 0–1)
BILIRUB SERPL-MCNC: <0.2 MG/DL (ref 0–1)
BUN SERPL-MCNC: 29 MG/DL (ref 7–20)
CALCIUM SERPL-MCNC: 9.1 MG/DL (ref 8.3–10.6)
CHLORIDE SERPL-SCNC: 105 MMOL/L (ref 99–110)
CO2 SERPL-SCNC: 25 MMOL/L (ref 21–32)
CREAT SERPL-MCNC: 1 MG/DL (ref 0.6–1.1)
EOSINOPHIL # BLD: 0.27 K/UL
EOSINOPHILS RELATIVE PERCENT: 5 % (ref 0–3)
ERYTHROCYTE [DISTWIDTH] IN BLOOD BY AUTOMATED COUNT: 14.3 % (ref 11.7–14.9)
GFR, ESTIMATED: 58 ML/MIN/1.73M2
GLUCOSE SERPL-MCNC: 94 MG/DL (ref 74–99)
HCT VFR BLD AUTO: 39.7 % (ref 37–47)
HGB BLD-MCNC: 12.9 G/DL (ref 12.5–16)
LYMPHOCYTES NFR BLD: 1.45 K/UL
LYMPHOCYTES RELATIVE PERCENT: 28 % (ref 24–44)
MCH RBC QN AUTO: 29.6 PG (ref 27–31)
MCHC RBC AUTO-ENTMCNC: 32.5 G/DL (ref 32–36)
MCV RBC AUTO: 91.1 FL (ref 78–100)
MONOCYTES NFR BLD: 0.39 K/UL
MONOCYTES NFR BLD: 8 % (ref 0–4)
NEUTROPHILS NFR BLD: 59 % (ref 36–66)
NEUTS SEG NFR BLD: 3.07 K/UL
PLATELET # BLD AUTO: 236 K/UL (ref 140–440)
PMV BLD AUTO: 8.9 FL (ref 7.5–11.1)
POTASSIUM SERPL-SCNC: 4.2 MMOL/L (ref 3.5–5.1)
PROT SERPL-MCNC: 6.4 G/DL (ref 6.4–8.2)
RBC # BLD AUTO: 4.36 M/UL (ref 4.2–5.4)
SODIUM SERPL-SCNC: 142 MMOL/L (ref 136–145)
WBC OTHER # BLD: 5.2 K/UL (ref 4–10.5)

## 2024-11-11 PROCEDURE — 80053 COMPREHEN METABOLIC PANEL: CPT

## 2024-11-11 PROCEDURE — 85025 COMPLETE CBC W/AUTO DIFF WBC: CPT

## 2024-11-11 PROCEDURE — 36415 COLL VENOUS BLD VENIPUNCTURE: CPT

## 2024-11-12 ENCOUNTER — HOSPITAL ENCOUNTER (OUTPATIENT)
Dept: INFUSION THERAPY | Age: 59
Discharge: HOME | End: 2024-11-12
Payer: MEDICAID

## 2024-11-12 ENCOUNTER — OFFICE VISIT (OUTPATIENT)
Dept: ONCOLOGY | Age: 59
End: 2024-11-12
Payer: MEDICAID

## 2024-11-12 ENCOUNTER — TELEPHONE (OUTPATIENT)
Dept: ONCOLOGY | Age: 59
End: 2024-11-12

## 2024-11-12 VITALS
DIASTOLIC BLOOD PRESSURE: 63 MMHG | TEMPERATURE: 97.4 F | BODY MASS INDEX: 25.16 KG/M2 | OXYGEN SATURATION: 98 % | WEIGHT: 166 LBS | HEIGHT: 68 IN | HEART RATE: 70 BPM | SYSTOLIC BLOOD PRESSURE: 119 MMHG

## 2024-11-12 VITALS
TEMPERATURE: 97.4 F | DIASTOLIC BLOOD PRESSURE: 63 MMHG | SYSTOLIC BLOOD PRESSURE: 119 MMHG | HEIGHT: 68 IN | OXYGEN SATURATION: 98 % | BODY MASS INDEX: 25.25 KG/M2 | HEART RATE: 70 BPM | WEIGHT: 166.6 LBS

## 2024-11-12 DIAGNOSIS — C15.9 ESOPHAGEAL ADENOCARCINOMA (HCC): ICD-10-CM

## 2024-11-12 DIAGNOSIS — Z11.59 SCREENING FOR VIRAL DISEASE: ICD-10-CM

## 2024-11-12 DIAGNOSIS — Z79.899 ENCOUNTER FOR LONG-TERM (CURRENT) USE OF MEDICATIONS: Primary | ICD-10-CM

## 2024-11-12 PROCEDURE — G8417 CALC BMI ABV UP PARAM F/U: HCPCS | Performed by: INTERNAL MEDICINE

## 2024-11-12 PROCEDURE — 96413 CHEMO IV INFUSION 1 HR: CPT

## 2024-11-12 PROCEDURE — 3017F COLORECTAL CA SCREEN DOC REV: CPT | Performed by: INTERNAL MEDICINE

## 2024-11-12 PROCEDURE — 1036F TOBACCO NON-USER: CPT | Performed by: INTERNAL MEDICINE

## 2024-11-12 PROCEDURE — 2580000003 HC RX 258: Performed by: INTERNAL MEDICINE

## 2024-11-12 PROCEDURE — G8484 FLU IMMUNIZE NO ADMIN: HCPCS | Performed by: INTERNAL MEDICINE

## 2024-11-12 PROCEDURE — G8427 DOCREV CUR MEDS BY ELIG CLIN: HCPCS | Performed by: INTERNAL MEDICINE

## 2024-11-12 PROCEDURE — 99214 OFFICE O/P EST MOD 30 MIN: CPT | Performed by: INTERNAL MEDICINE

## 2024-11-12 PROCEDURE — 6360000002 HC RX W HCPCS: Performed by: INTERNAL MEDICINE

## 2024-11-12 RX ORDER — OXYCODONE HCL 20 MG/ML
10 CONCENTRATE, ORAL ORAL EVERY 4 HOURS PRN
Qty: 90 ML | Refills: 0 | Status: CANCELLED | OUTPATIENT
Start: 2024-11-12 | End: 2024-12-12

## 2024-11-12 RX ORDER — OXYCODONE HCL 20 MG/ML
10 CONCENTRATE, ORAL ORAL EVERY 4 HOURS PRN
Qty: 90 ML | Refills: 0 | Status: SHIPPED | OUTPATIENT
Start: 2024-11-12 | End: 2024-11-13 | Stop reason: SDUPTHER

## 2024-11-12 RX ORDER — SODIUM CHLORIDE 0.9 % (FLUSH) 0.9 %
5-40 SYRINGE (ML) INJECTION PRN
Status: DISCONTINUED | OUTPATIENT
Start: 2024-11-12 | End: 2024-11-13 | Stop reason: HOSPADM

## 2024-11-12 RX ADMIN — SODIUM CHLORIDE, PRESERVATIVE FREE 20 ML: 5 INJECTION INTRAVENOUS at 11:09

## 2024-11-12 RX ADMIN — SODIUM CHLORIDE 200 MG: 9 INJECTION, SOLUTION INTRAVENOUS at 10:19

## 2024-11-12 ASSESSMENT — PAIN SCALES - GENERAL: PAINLEVEL_OUTOF10: 3

## 2024-11-12 ASSESSMENT — PAIN DESCRIPTION - LOCATION: LOCATION: GENERALIZED

## 2024-11-12 NOTE — TELEPHONE ENCOUNTER
Lauren from [facility]. Pharmacy called and states patient insurance gain well is requesting 7 day RX for first fill on oxycodone as patient is narcotic naive .  Also [facility] only has two bottles of oxycodone 100 mg which contain 30ml each and can't re order at this time.   Please advise [phone]

## 2024-11-12 NOTE — PROGRESS NOTES
Patient arrived to treatment suite for immunotherapy infusion.  Right chest mediport accessed and good blood return noted.  Patient has no questions or concerns for the doctor at this time.  Treatment approved and given.  Patient tolerated well.  Left treatment suite ambulatory.  Discharge instructions provided at check-out due to doctor appointment.     Patient's status assessed and documented appropriately.  All labs and required results were also reviewed today.  Treatment parameters have been reviewed.  Today's treatment has been approved by the provider.  Treatment orders and medication sequencing (when applicable) was verified by 2 registered nurses.  The treatment plan was confirmed with the patient prior to administration, and the patient understands the need to report any treatment-related symptoms.     Prior to administration, when applicable, the following 8 elements of medication administration were reviewed with 2nd Registered Nurse prior to dosing: drug name, drug dose, infusion volume when prepared in a syringe, rate of administration, expiration dates and/or times, appearance and integrity of drug(s), and rate of pump for infusion.  The 5 rights of medication administration have been verified.

## 2024-11-12 NOTE — PROGRESS NOTES
MA Rooming Questions  Patient: Denisa Basurto  MRN: [id number]    Date: 11/12/2024        1. Do you have any new issues?   no         2. Do you need any refills on medications?    yes - Oxdycodone.     3. Have you had any imaging done since your last visit?   no    4. Have you been hospitalized or seen in the emergency room since your last visit here?   no    5. Did the patient have a depression screening completed today? No    No data recorded     PHQ-9 Given to (if applicable):               PHQ-9 Score (if applicable):                     [] Positive     []  Negative              Does question #9 need addressed (if applicable)                     [] Yes    []  No               Carmen Mccartney MA      
melena, No jaundice, No dyspepsia, Has dysphagia.  Urinary:  No dysuria, No hematuria, No urinary incontinence.  Gynecological:  No vaginal discharge, No suprapubic pain, No abnormal vaginal bleeding.  (Female Patients Only)  Musculoskeletal:  No muscle pain, No swollen joints, No joint redness, No bone pain, No spine tenderness.  Skin:  No rash, No nodules, No pruritus, No lesions.  Neurologic:  No confusion, No seizures, No syncope, No tremor, No speech change, No headache, No hiccups, No abnormal gait, No sensory changes, No weakness.  Psychiatric:  No depression, No anxiety, Concentration normal.  Endocrine:  No polyuria, No polydipsia, No hot flashes, No thyroid symptoms.  Hematologic:  No epistaxis, No gingival bleeding, No petechiae, No ecchymosis.  Lymphatic:  No lymphadenopathy, No lymphedema.  Allergy / Immunologic:  No eczema, No frequent mucous infections, No frequent respiratory infections, No recurrent urticarial, No frequent skin infections.     Vital Signs: /63 (Site: Left Upper Arm, Position: Sitting, Cuff Size: Small Adult)   Pulse 70   Temp 97.4 °F (36.3 °C) (Infrared)   Ht 1.727 m (5' 8\")   Wt 75.3 kg (166 lb)   SpO2 98%   BMI 25.24 kg/m²      Physical Exam:  CONSTITUTIONAL: awake, alert, cooperative, no apparent distress   EYES: pupils equal, round and reactive to light, sclera clear, normal conjunctiva  ENT: Normocephalic, without obvious abnormality, atraumatic  NECK: supple, symmetrical, no jugular venous distension, no carotid bruits   HEMATOLOGIC/LYMPHATIC: no cervical, supraclavicular or axillary lymphadenopathy   LUNGS: VBS, no wheezes, no increased work of breathing, no rhonchi, clear to auscultation, no crackles,    CARDIOVASCULAR: regular rate and rhythm, normal S1 and S2, no murmur noted  ABDOMEN: normal bowel sounds x 4, soft, non-distended, non-tender, no masses palpated, no hepatosplenomegaly   MUSCULOSKELETAL: full range of motion noted, tone is normal  NEUROLOGIC:

## 2024-11-13 ENCOUNTER — CLINICAL DOCUMENTATION (OUTPATIENT)
Dept: ONCOLOGY | Age: 59
End: 2024-11-13

## 2024-11-13 DIAGNOSIS — C15.9 ESOPHAGEAL ADENOCARCINOMA (HCC): ICD-10-CM

## 2024-11-13 RX ORDER — OXYCODONE HCL 20 MG/ML
10 CONCENTRATE, ORAL ORAL EVERY 4 HOURS PRN
Qty: 21 ML | Refills: 0 | Status: SHIPPED | OUTPATIENT
Start: 2024-11-13 | End: 2024-11-20

## 2024-11-13 NOTE — PROGRESS NOTES
PA submitted thru cover my meds for oxyCODONE HCl 100MG/5ML concentrate Electronic prior authorization not required for NDC. If requesting a quantity above allowable limits, please submit via other method.   I called sandra at [phone] and spoke with Johnnie and submitted quantity over ride, it went to review.   I called and spoke with Neal at [facility]. Pharmacy he was able to process RX with zero copay.  He will fill RX and notify patient.

## 2024-12-02 ENCOUNTER — HOSPITAL ENCOUNTER (OUTPATIENT)
Dept: INFUSION THERAPY | Age: 59
Discharge: HOME | End: 2024-12-02
Payer: MEDICAID

## 2024-12-02 DIAGNOSIS — Z11.59 SCREENING FOR VIRAL DISEASE: ICD-10-CM

## 2024-12-02 DIAGNOSIS — C15.9 ESOPHAGEAL ADENOCARCINOMA (HCC): ICD-10-CM

## 2024-12-02 DIAGNOSIS — Z79.899 ENCOUNTER FOR LONG-TERM (CURRENT) USE OF MEDICATIONS: ICD-10-CM

## 2024-12-02 LAB
ALBUMIN SERPL-MCNC: 4.1 G/DL (ref 3.4–5)
ALBUMIN/GLOB SERPL: 1.6 {RATIO} (ref 1.1–2.2)
ALP SERPL-CCNC: 85 U/L (ref 40–129)
ALT SERPL-CCNC: 15 U/L (ref 10–40)
ANION GAP SERPL CALCULATED.3IONS-SCNC: 11 MMOL/L (ref 9–17)
AST SERPL-CCNC: 20 U/L (ref 15–37)
BASOPHILS # BLD: 0.03 K/UL
BASOPHILS NFR BLD: 1 % (ref 0–1)
BILIRUB SERPL-MCNC: 0.2 MG/DL (ref 0–1)
BUN SERPL-MCNC: 18 MG/DL (ref 7–20)
CALCIUM SERPL-MCNC: 9.4 MG/DL (ref 8.3–10.6)
CHLORIDE SERPL-SCNC: 102 MMOL/L (ref 99–110)
CO2 SERPL-SCNC: 27 MMOL/L (ref 21–32)
CREAT SERPL-MCNC: 1.2 MG/DL (ref 0.6–1.1)
EOSINOPHIL # BLD: 0.23 K/UL
EOSINOPHILS RELATIVE PERCENT: 6 % (ref 0–3)
ERYTHROCYTE [DISTWIDTH] IN BLOOD BY AUTOMATED COUNT: 14.4 % (ref 11.7–14.9)
GFR, ESTIMATED: 48 ML/MIN/1.73M2
GLUCOSE SERPL-MCNC: 79 MG/DL (ref 74–99)
HCT VFR BLD AUTO: 39.4 % (ref 37–47)
HGB BLD-MCNC: 12.8 G/DL (ref 12.5–16)
LYMPHOCYTES NFR BLD: 1.17 K/UL
LYMPHOCYTES RELATIVE PERCENT: 33 % (ref 24–44)
MCH RBC QN AUTO: 30.3 PG (ref 27–31)
MCHC RBC AUTO-ENTMCNC: 32.5 G/DL (ref 32–36)
MCV RBC AUTO: 93.4 FL (ref 78–100)
MONOCYTES NFR BLD: 0.21 K/UL
MONOCYTES NFR BLD: 6 % (ref 0–4)
NEUTROPHILS NFR BLD: 54 % (ref 36–66)
NEUTS SEG NFR BLD: 1.94 K/UL
PLATELET # BLD AUTO: 207 K/UL (ref 140–440)
PMV BLD AUTO: 9 FL (ref 7.5–11.1)
POTASSIUM SERPL-SCNC: 4.2 MMOL/L (ref 3.5–5.1)
PROT SERPL-MCNC: 6.8 G/DL (ref 6.4–8.2)
RBC # BLD AUTO: 4.22 M/UL (ref 4.2–5.4)
SODIUM SERPL-SCNC: 141 MMOL/L (ref 136–145)
T4 FREE SERPL-MCNC: 0.8 NG/DL (ref 0.9–1.8)
TSH SERPL DL<=0.05 MIU/L-ACNC: 7.81 UIU/ML (ref 0.27–4.2)
WBC OTHER # BLD: 3.6 K/UL (ref 4–10.5)

## 2024-12-02 PROCEDURE — 84443 ASSAY THYROID STIM HORMONE: CPT

## 2024-12-02 PROCEDURE — 80053 COMPREHEN METABOLIC PANEL: CPT

## 2024-12-02 PROCEDURE — 84439 ASSAY OF FREE THYROXINE: CPT

## 2024-12-02 PROCEDURE — 36415 COLL VENOUS BLD VENIPUNCTURE: CPT

## 2024-12-02 PROCEDURE — 85025 COMPLETE CBC W/AUTO DIFF WBC: CPT

## 2024-12-03 ENCOUNTER — OFFICE VISIT (OUTPATIENT)
Dept: ONCOLOGY | Age: 59
End: 2024-12-03
Payer: MEDICAID

## 2024-12-03 ENCOUNTER — HOSPITAL ENCOUNTER (OUTPATIENT)
Dept: INFUSION THERAPY | Age: 59
Discharge: HOME | End: 2024-12-03
Payer: MEDICAID

## 2024-12-03 ENCOUNTER — CLINICAL DOCUMENTATION (OUTPATIENT)
Dept: ONCOLOGY | Age: 59
End: 2024-12-03

## 2024-12-03 VITALS
DIASTOLIC BLOOD PRESSURE: 90 MMHG | HEIGHT: 68 IN | BODY MASS INDEX: 26.07 KG/M2 | OXYGEN SATURATION: 100 % | HEART RATE: 66 BPM | WEIGHT: 172 LBS | SYSTOLIC BLOOD PRESSURE: 152 MMHG | TEMPERATURE: 97.2 F

## 2024-12-03 DIAGNOSIS — C15.9 ESOPHAGEAL ADENOCARCINOMA (HCC): Primary | ICD-10-CM

## 2024-12-03 DIAGNOSIS — C15.9 ESOPHAGEAL ADENOCARCINOMA (HCC): ICD-10-CM

## 2024-12-03 DIAGNOSIS — Z79.899 ENCOUNTER FOR LONG-TERM (CURRENT) USE OF MEDICATIONS: Primary | ICD-10-CM

## 2024-12-03 DIAGNOSIS — Z11.59 SCREENING FOR VIRAL DISEASE: ICD-10-CM

## 2024-12-03 PROCEDURE — G8427 DOCREV CUR MEDS BY ELIG CLIN: HCPCS | Performed by: INTERNAL MEDICINE

## 2024-12-03 PROCEDURE — 6360000002 HC RX W HCPCS: Performed by: INTERNAL MEDICINE

## 2024-12-03 PROCEDURE — 99214 OFFICE O/P EST MOD 30 MIN: CPT | Performed by: INTERNAL MEDICINE

## 2024-12-03 PROCEDURE — 1036F TOBACCO NON-USER: CPT | Performed by: INTERNAL MEDICINE

## 2024-12-03 PROCEDURE — 96413 CHEMO IV INFUSION 1 HR: CPT

## 2024-12-03 PROCEDURE — G8484 FLU IMMUNIZE NO ADMIN: HCPCS | Performed by: INTERNAL MEDICINE

## 2024-12-03 PROCEDURE — 2580000003 HC RX 258: Performed by: INTERNAL MEDICINE

## 2024-12-03 PROCEDURE — 3017F COLORECTAL CA SCREEN DOC REV: CPT | Performed by: INTERNAL MEDICINE

## 2024-12-03 PROCEDURE — G8417 CALC BMI ABV UP PARAM F/U: HCPCS | Performed by: INTERNAL MEDICINE

## 2024-12-03 RX ORDER — SODIUM CHLORIDE 0.9 % (FLUSH) 0.9 %
5-40 SYRINGE (ML) INJECTION PRN
Status: DISCONTINUED | OUTPATIENT
Start: 2024-12-03 | End: 2024-12-04 | Stop reason: HOSPADM

## 2024-12-03 RX ORDER — OXYCODONE HCL 20 MG/ML
10 CONCENTRATE, ORAL ORAL EVERY 4 HOURS PRN
Qty: 21 ML | Refills: 0 | Status: SHIPPED | OUTPATIENT
Start: 2024-12-03 | End: 2024-12-05 | Stop reason: SDUPTHER

## 2024-12-03 RX ADMIN — SODIUM CHLORIDE 200 MG: 9 INJECTION, SOLUTION INTRAVENOUS at 10:59

## 2024-12-03 RX ADMIN — SODIUM CHLORIDE, PRESERVATIVE FREE 20 ML: 5 INJECTION INTRAVENOUS at 11:41

## 2024-12-03 ASSESSMENT — PATIENT HEALTH QUESTIONNAIRE - PHQ9
SUM OF ALL RESPONSES TO PHQ QUESTIONS 1-9: 0
SUM OF ALL RESPONSES TO PHQ9 QUESTIONS 1 & 2: 0
2. FEELING DOWN, DEPRESSED OR HOPELESS: NOT AT ALL
1. LITTLE INTEREST OR PLEASURE IN DOING THINGS: NOT AT ALL
SUM OF ALL RESPONSES TO PHQ QUESTIONS 1-9: 0

## 2024-12-03 NOTE — PROGRESS NOTES
Status appropriately assessed and documented. All required labs and results reviewed. Treatment approved by provider. Treatment orders and medications verified by 2 Registered Nurses where applicable. Treatment plan was confirmed with patient prior to administration, and educated the need to report any treatment-related symptoms      Ambulated to infusion area, here today for chemotherapy. TSH 7.81, had an OV with Dr. Guthrie prior to treatment. Right chest mediport accessed, positive blood return noted. Labs reviewed, TSH 7.81, ok per Dr. Guthrie.  Chemo administered as ordered. Call light within reach. Tolerated infusion without incident.  Discharge instructions provided.

## 2024-12-03 NOTE — PROGRESS NOTES
Lab results from12/02/2024 reviewed. TSH (7.81). If TSH becomes >10, physician will consider to start levothyroxine. Called patient @ [phone] to notify. Voices understanding.

## 2024-12-03 NOTE — PROGRESS NOTES
MA Rooming Questions  Patient: Denisa Basurto  MRN: [id number]    Date: 12/3/2024        1. Do you have any new issues?   no         2. Do you need any refills on medications?    yes - liquid oxycodone     3. Have you had any imaging done since your last visit?   yes - labs 12/2    4. Have you been hospitalized or seen in the emergency room since your last visit here?   no    5. Did the patient have a depression screening completed today? Yes    PHQ-9 Total Score: 0 (12/3/2024 10:18 AM)       PHQ-9 Given to (if applicable):               PHQ-9 Score (if applicable):                     [] Positive     []  Negative              Does question #9 need addressed (if applicable)                     [] Yes    []  No               Shanti Bustamante CMA      
range of motion noted, tone is normal  NEUROLOGIC: awake, alert, oriented to name, place and time. Motor skills grossly intact.   SKIN: appears intact, normal skin color, normal texture, normal turgor, no jaundice.   EXTREMITIES: no clubbing, no leg swelling, no LE edema, no cyanosis,       Labs:  Hematology:  Lab Results   Component Value Date    WBC 3.6 (L) 12/02/2024    RBC 4.22 12/02/2024    HGB 12.8 12/02/2024    HCT 39.4 12/02/2024    MCV 93.4 12/02/2024    MCH 30.3 12/02/2024    MCHC 32.5 12/02/2024    RDW 14.4 12/02/2024     12/02/2024    MPV 9.0 12/02/2024    BASOPCT 1 12/02/2024    LYMPHOPCT 33 12/02/2024    MONOPCT 6 (H) 12/02/2024    EOSABS 0.23 12/02/2024    BASOSABS 0.03 12/02/2024    LYMPHSABS 1.17 12/02/2024    MONOSABS 0.21 12/02/2024    DIFFTYPE AUTOMATED DIFFERENTIAL 08/30/2024     No results found for: \"ESR\"  Chemistry:  Lab Results   Component Value Date     12/02/2024    K 4.2 12/02/2024     12/02/2024    CO2 27 12/02/2024    BUN 18 12/02/2024    CREATININE 1.2 (H) 12/02/2024    GLUCOSE 79 12/02/2024    CALCIUM 9.4 12/02/2024    BILITOT 0.2 12/02/2024    ALKPHOS 85 12/02/2024    AST 20 12/02/2024    ALT 15 12/02/2024    LABGLOM 48 (L) 12/02/2024     No results found for: \"MMA\", \"LDH\", \"HOMOCYSTEINE\"  No results found for: \"LD\"  Lab Results   Component Value Date    TSHHS 5.200 (H) 08/30/2024    T4FREE 0.8 (L) 12/02/2024     Immunology:  No results found for: \"SPEP\", \"ALBUMINELP\", \"LABALPH\", \"LABBETA\", \"GAMGLOB\"    No results found for: \"KAPPAUVOL\", \"LAMBDAUVOL\", \"KLFLCR\"  No results found for: \"B2M\"  Coagulation Panel:  No results found for: \"PROTIME\", \"INR\", \"APTT\", \"DDIMER\"  Anemia Panel:  No results found for: \"XMNWTGTL94\", \"FOLATE\"  Tumor Markers:  No results found for: \"\", \"CEA\", \"\", \"LABCA2\", \"PSA\"     Observations:  PHQ-9 Total Score: 0 (12/3/2024 10:18 AM)         Assessment:  Esophageal adenocarcinoma  Left upper lobe well-differentiated neuroendocrine

## 2024-12-05 ENCOUNTER — TELEPHONE (OUTPATIENT)
Dept: ONCOLOGY | Age: 59
End: 2024-12-05

## 2024-12-05 DIAGNOSIS — C15.9 ESOPHAGEAL ADENOCARCINOMA (HCC): ICD-10-CM

## 2024-12-05 RX ORDER — OXYCODONE HCL 20 MG/ML
10 CONCENTRATE, ORAL ORAL EVERY 4 HOURS PRN
Qty: 30 ML | Refills: 0 | Status: SHIPPED | OUTPATIENT
Start: 2024-12-05 | End: 2024-12-15

## 2024-12-05 NOTE — TELEPHONE ENCOUNTER
Mikaela from [facility] pharmacy called and lvm that the oxycodone comes pre packaged in 30 ml.  Would like a new RX sent.

## 2024-12-09 RX ORDER — OXYCODONE HCL 20 MG/ML
10 CONCENTRATE, ORAL ORAL EVERY 4 HOURS PRN
Qty: 30 ML | Refills: 0 | OUTPATIENT
Start: 2024-12-09 | End: 2024-12-19

## 2024-12-21 RX ORDER — EPINEPHRINE 1 MG/ML
0.3 INJECTION, SOLUTION, CONCENTRATE INTRAVENOUS PRN
OUTPATIENT
Start: 2024-12-24

## 2024-12-21 RX ORDER — ONDANSETRON 2 MG/ML
8 INJECTION INTRAMUSCULAR; INTRAVENOUS
OUTPATIENT
Start: 2024-12-24

## 2024-12-21 RX ORDER — SODIUM CHLORIDE 0.9 % (FLUSH) 0.9 %
5-40 SYRINGE (ML) INJECTION PRN
OUTPATIENT
Start: 2024-12-24

## 2024-12-21 RX ORDER — SODIUM CHLORIDE 9 MG/ML
5-250 INJECTION, SOLUTION INTRAVENOUS PRN
OUTPATIENT
Start: 2024-12-24

## 2024-12-21 RX ORDER — SODIUM CHLORIDE 9 MG/ML
INJECTION, SOLUTION INTRAVENOUS CONTINUOUS
OUTPATIENT
Start: 2024-12-24

## 2024-12-21 RX ORDER — DIPHENHYDRAMINE HYDROCHLORIDE 50 MG/ML
50 INJECTION INTRAMUSCULAR; INTRAVENOUS
OUTPATIENT
Start: 2024-12-24

## 2024-12-21 RX ORDER — HYDROCORTISONE SODIUM SUCCINATE 100 MG/2ML
100 INJECTION INTRAMUSCULAR; INTRAVENOUS
OUTPATIENT
Start: 2024-12-24

## 2024-12-21 RX ORDER — ACETAMINOPHEN 325 MG/1
650 TABLET ORAL
OUTPATIENT
Start: 2024-12-24

## 2024-12-21 RX ORDER — ALBUTEROL SULFATE 90 UG/1
4 INHALANT RESPIRATORY (INHALATION) PRN
OUTPATIENT
Start: 2024-12-24

## 2024-12-21 RX ORDER — FAMOTIDINE 10 MG/ML
20 INJECTION, SOLUTION INTRAVENOUS
OUTPATIENT
Start: 2024-12-24

## 2024-12-21 RX ORDER — MEPERIDINE HYDROCHLORIDE 50 MG/ML
12.5 INJECTION INTRAMUSCULAR; INTRAVENOUS; SUBCUTANEOUS PRN
OUTPATIENT
Start: 2024-12-24

## 2024-12-21 RX ORDER — HEPARIN SODIUM (PORCINE) LOCK FLUSH IV SOLN 100 UNIT/ML 100 UNIT/ML
500 SOLUTION INTRAVENOUS PRN
OUTPATIENT
Start: 2024-12-24

## 2024-12-23 ENCOUNTER — HOSPITAL ENCOUNTER (OUTPATIENT)
Dept: INFUSION THERAPY | Age: 59
Discharge: HOME | End: 2024-12-23
Payer: MEDICAID

## 2024-12-23 ENCOUNTER — CLINICAL DOCUMENTATION (OUTPATIENT)
Dept: INFUSION THERAPY | Age: 59
End: 2024-12-23

## 2024-12-23 DIAGNOSIS — C15.9 ESOPHAGEAL ADENOCARCINOMA (HCC): ICD-10-CM

## 2024-12-23 DIAGNOSIS — Z79.899 ENCOUNTER FOR LONG-TERM (CURRENT) USE OF MEDICATIONS: ICD-10-CM

## 2024-12-23 DIAGNOSIS — Z11.59 SCREENING FOR VIRAL DISEASE: ICD-10-CM

## 2024-12-23 LAB
ALBUMIN SERPL-MCNC: 4 G/DL (ref 3.4–5)
ALBUMIN/GLOB SERPL: 1.6 {RATIO} (ref 1.1–2.2)
ALP SERPL-CCNC: 83 U/L (ref 40–129)
ALT SERPL-CCNC: 13 U/L (ref 10–40)
ANION GAP SERPL CALCULATED.3IONS-SCNC: 10 MMOL/L (ref 9–17)
AST SERPL-CCNC: 18 U/L (ref 15–37)
BASOPHILS # BLD: 0.03 K/UL
BASOPHILS NFR BLD: 1 % (ref 0–1)
BILIRUB SERPL-MCNC: 0.3 MG/DL (ref 0–1)
BUN SERPL-MCNC: 19 MG/DL (ref 7–20)
CALCIUM SERPL-MCNC: 9.1 MG/DL (ref 8.3–10.6)
CHLORIDE SERPL-SCNC: 106 MMOL/L (ref 99–110)
CO2 SERPL-SCNC: 28 MMOL/L (ref 21–32)
CREAT SERPL-MCNC: 1.1 MG/DL (ref 0.6–1.1)
EOSINOPHIL # BLD: 0.31 K/UL
EOSINOPHILS RELATIVE PERCENT: 9 % (ref 0–3)
ERYTHROCYTE [DISTWIDTH] IN BLOOD BY AUTOMATED COUNT: 13.8 % (ref 11.7–14.9)
GFR, ESTIMATED: 53 ML/MIN/1.73M2
GLUCOSE SERPL-MCNC: 111 MG/DL (ref 74–99)
HCT VFR BLD AUTO: 40.6 % (ref 37–47)
HGB BLD-MCNC: 13.2 G/DL (ref 12.5–16)
LYMPHOCYTES NFR BLD: 0.9 K/UL
LYMPHOCYTES RELATIVE PERCENT: 26 % (ref 24–44)
MCH RBC QN AUTO: 30.1 PG (ref 27–31)
MCHC RBC AUTO-ENTMCNC: 32.5 G/DL (ref 32–36)
MCV RBC AUTO: 92.5 FL (ref 78–100)
MONOCYTES NFR BLD: 0.25 K/UL
MONOCYTES NFR BLD: 7 % (ref 0–4)
NEUTROPHILS NFR BLD: 57 % (ref 36–66)
NEUTS SEG NFR BLD: 1.99 K/UL
PLATELET # BLD AUTO: 172 K/UL (ref 140–440)
PMV BLD AUTO: 9.3 FL (ref 7.5–11.1)
POTASSIUM SERPL-SCNC: 4.3 MMOL/L (ref 3.5–5.1)
PROT SERPL-MCNC: 6.5 G/DL (ref 6.4–8.2)
RBC # BLD AUTO: 4.39 M/UL (ref 4.2–5.4)
SODIUM SERPL-SCNC: 143 MMOL/L (ref 136–145)
WBC OTHER # BLD: 3.5 K/UL (ref 4–10.5)

## 2024-12-23 PROCEDURE — 80053 COMPREHEN METABOLIC PANEL: CPT

## 2024-12-23 PROCEDURE — 36415 COLL VENOUS BLD VENIPUNCTURE: CPT

## 2024-12-23 PROCEDURE — 85025 COMPLETE CBC W/AUTO DIFF WBC: CPT

## 2024-12-23 NOTE — PROGRESS NOTES
Pt called for reminder of tx time of 0945 on 12/24 and labs to be drawn on 12/23. Pt verbalized understanding

## 2024-12-24 ENCOUNTER — HOSPITAL ENCOUNTER (OUTPATIENT)
Dept: INFUSION THERAPY | Age: 59
Discharge: HOME | End: 2024-12-24
Payer: MEDICAID

## 2024-12-24 ENCOUNTER — OFFICE VISIT (OUTPATIENT)
Dept: ONCOLOGY | Age: 59
End: 2024-12-24
Payer: MEDICAID

## 2024-12-24 VITALS
WEIGHT: 173.2 LBS | SYSTOLIC BLOOD PRESSURE: 115 MMHG | TEMPERATURE: 97.7 F | BODY MASS INDEX: 26.25 KG/M2 | HEIGHT: 68 IN | HEART RATE: 61 BPM | DIASTOLIC BLOOD PRESSURE: 69 MMHG | OXYGEN SATURATION: 100 %

## 2024-12-24 VITALS
DIASTOLIC BLOOD PRESSURE: 69 MMHG | TEMPERATURE: 97.7 F | WEIGHT: 173.2 LBS | HEART RATE: 61 BPM | SYSTOLIC BLOOD PRESSURE: 115 MMHG | HEIGHT: 68 IN | BODY MASS INDEX: 26.25 KG/M2 | OXYGEN SATURATION: 100 %

## 2024-12-24 DIAGNOSIS — C15.9 ESOPHAGEAL ADENOCARCINOMA (HCC): Primary | ICD-10-CM

## 2024-12-24 DIAGNOSIS — Z11.59 SCREENING FOR VIRAL DISEASE: ICD-10-CM

## 2024-12-24 DIAGNOSIS — Z79.899 ENCOUNTER FOR LONG-TERM (CURRENT) USE OF MEDICATIONS: ICD-10-CM

## 2024-12-24 PROCEDURE — 99214 OFFICE O/P EST MOD 30 MIN: CPT | Performed by: INTERNAL MEDICINE

## 2024-12-24 PROCEDURE — G8417 CALC BMI ABV UP PARAM F/U: HCPCS | Performed by: INTERNAL MEDICINE

## 2024-12-24 PROCEDURE — G8427 DOCREV CUR MEDS BY ELIG CLIN: HCPCS | Performed by: INTERNAL MEDICINE

## 2024-12-24 PROCEDURE — 2580000003 HC RX 258: Performed by: INTERNAL MEDICINE

## 2024-12-24 PROCEDURE — 3017F COLORECTAL CA SCREEN DOC REV: CPT | Performed by: INTERNAL MEDICINE

## 2024-12-24 PROCEDURE — 6360000002 HC RX W HCPCS: Performed by: INTERNAL MEDICINE

## 2024-12-24 PROCEDURE — 96413 CHEMO IV INFUSION 1 HR: CPT

## 2024-12-24 PROCEDURE — 1036F TOBACCO NON-USER: CPT | Performed by: INTERNAL MEDICINE

## 2024-12-24 PROCEDURE — G8484 FLU IMMUNIZE NO ADMIN: HCPCS | Performed by: INTERNAL MEDICINE

## 2024-12-24 RX ORDER — OMEPRAZOLE 40 MG/1
40 CAPSULE, DELAYED RELEASE ORAL 2 TIMES DAILY
Qty: 60 CAPSULE | Refills: 3 | Status: SHIPPED | OUTPATIENT
Start: 2024-12-24 | End: 2025-01-23

## 2024-12-24 RX ORDER — SODIUM CHLORIDE 0.9 % (FLUSH) 0.9 %
5-40 SYRINGE (ML) INJECTION PRN
Status: DISCONTINUED | OUTPATIENT
Start: 2024-12-24 | End: 2024-12-25 | Stop reason: HOSPADM

## 2024-12-24 RX ORDER — ALBUTEROL SULFATE 90 UG/1
2 INHALANT RESPIRATORY (INHALATION) 4 TIMES DAILY
Qty: 4 EACH | Refills: 0 | Status: SHIPPED | OUTPATIENT
Start: 2024-12-24 | End: 2025-01-23

## 2024-12-24 RX ADMIN — SODIUM CHLORIDE 200 MG: 9 INJECTION, SOLUTION INTRAVENOUS at 10:19

## 2024-12-24 ASSESSMENT — PAIN SCALES - GENERAL: PAINLEVEL_OUTOF10: 4

## 2024-12-24 NOTE — PROGRESS NOTES
MA Rooming Questions  Patient: Denisa Basurto  MRN: [id number]    Date: 12/24/2024        1. Do you have any new issues?   no         2. Do you need any refills on medications?    yes - Omeprazole & Inhaler     3. Have you had any imaging done since your last visit?   no    4. Have you been hospitalized or seen in the emergency room since your last visit here?   no    5. Did the patient have a depression screening completed today? No    No data recorded     PHQ-9 Given to (if applicable):               PHQ-9 Score (if applicable):                     [] Positive     []  Negative              Does question #9 need addressed (if applicable)                     [] Yes    []  No               Haylie Matos MA      
esophageal mass extending to the gastric cardia and fundus, compatible with history of gastric adenocarcinoma. Two hypermetabolic left upper lobe pulmonary nodules, one of which is cavitary, most concerning for pulmonary metastases. Low-density nodularity of bilateral adrenal glands, favoring adrenal adenomas.    Since she has MSI unstable disease, I started neoadjuvant chemotherapy with pembrolizumab since 2/6/2024.     She underwent biopsy of CHELE lung lesion on 2/22/24 in [facility] and pathology showed Fibrotic lung parenchyma with inflammation and necrotizing granuloma and marked reactive epithelial atypia. Special stains (GMS and AFB) are negative for acid fast microorganisms and fungi. Reactive epithelial cells are positive for AE1/3 and negative for p63, TTF1, chromogranin, synaptophysin, CDX2, CMV, HSV.  Dr. Plaza reviewed the case. All controls show appropriate reactivity.    CT chest, abdomen, pelvis done on 4/23/24 showed a neoplastic mass involving the gastroesophageal junction and cardia of the stomach has decreased in size and now measures approximately 5.9 cm compared to 7.1 cm previously consistent with positive treatment response. A 1.9 cm left upper lobe cavitary nodule and a 2.4 cm left upper lobe solid nodule are stable since 1/27/2024 and could represent primary or metastatic malignancy. Severe emphysema. Mild symmetric bilateral axillary lymphadenopathy with mildly enlarged axillary lymph nodes with fatty ab. These likely represent reactive lymph nodes. No evidence metastatic disease in the abdomen or pelvis. Mild symmetric bilateral inguinal lymphadenopathy is new since prior PET/CT but similar to the axillary lymphadenopathy is favored to represent nonspecific reactive lymph nodes. Continued follow-up is recommended. Stable right greater than left adrenal adenomas.    PET/CT on 7/31/24 showed focus of mural hypermetabolic activity, at the GE junction, consistent with residual/recurrent

## 2024-12-24 NOTE — PROGRESS NOTES
Status appropriately assessed and documented. All required labs and results reviewed. Treatment approved by provider. Treatment orders and medications verified by 2 Registered Nurses where applicable. Treatment plan was confirmed with patient prior to administration, and educated the need to report any treatment-related symptoms      Ambulated to infusion area, here today for chemotherapy. Had an OV with Dr. Guthrie after treatment. No concerns at this time. Right chest mediport accessed, positive blood return noted. Labs reviewed, Labs within defined limits.  Chemo administered as ordered. Call light within reach. Tolerated infusion without incident.  Discharge instructions declined.

## 2025-01-13 ENCOUNTER — HOSPITAL ENCOUNTER (OUTPATIENT)
Dept: INFUSION THERAPY | Age: 60
Discharge: HOME | End: 2025-01-13
Payer: MEDICAID

## 2025-01-13 DIAGNOSIS — C15.9 ESOPHAGEAL ADENOCARCINOMA (HCC): ICD-10-CM

## 2025-01-13 DIAGNOSIS — C15.9 ESOPHAGEAL ADENOCARCINOMA (HCC): Primary | ICD-10-CM

## 2025-01-13 DIAGNOSIS — Z11.59 SCREENING FOR VIRAL DISEASE: ICD-10-CM

## 2025-01-13 DIAGNOSIS — Z79.899 ENCOUNTER FOR LONG-TERM (CURRENT) USE OF MEDICATIONS: ICD-10-CM

## 2025-01-13 LAB
ALBUMIN SERPL-MCNC: 4.2 G/DL (ref 3.4–5)
ALBUMIN/GLOB SERPL: 1.4 {RATIO} (ref 1.1–2.2)
ALP SERPL-CCNC: 87 U/L (ref 40–129)
ALT SERPL-CCNC: 13 U/L (ref 10–40)
ANION GAP SERPL CALCULATED.3IONS-SCNC: 11 MMOL/L (ref 9–17)
AST SERPL-CCNC: 17 U/L (ref 15–37)
BASOPHILS # BLD: 0.03 K/UL
BASOPHILS NFR BLD: 1 % (ref 0–1)
BILIRUB SERPL-MCNC: 0.3 MG/DL (ref 0–1)
BUN SERPL-MCNC: 18 MG/DL (ref 7–20)
CALCIUM SERPL-MCNC: 9.4 MG/DL (ref 8.3–10.6)
CHLORIDE SERPL-SCNC: 103 MMOL/L (ref 99–110)
CO2 SERPL-SCNC: 27 MMOL/L (ref 21–32)
CREAT SERPL-MCNC: 0.9 MG/DL (ref 0.6–1.1)
EOSINOPHIL # BLD: 0.2 K/UL
EOSINOPHILS RELATIVE PERCENT: 6 % (ref 0–3)
ERYTHROCYTE [DISTWIDTH] IN BLOOD BY AUTOMATED COUNT: 13.5 % (ref 11.7–14.9)
GFR, ESTIMATED: 64 ML/MIN/1.73M2
GLUCOSE SERPL-MCNC: 97 MG/DL (ref 74–99)
HCT VFR BLD AUTO: 41.3 % (ref 37–47)
HGB BLD-MCNC: 13.5 G/DL (ref 12.5–16)
LYMPHOCYTES NFR BLD: 1.12 K/UL
LYMPHOCYTES RELATIVE PERCENT: 35 % (ref 24–44)
MCH RBC QN AUTO: 29.7 PG (ref 27–31)
MCHC RBC AUTO-ENTMCNC: 32.7 G/DL (ref 32–36)
MCV RBC AUTO: 90.8 FL (ref 78–100)
MONOCYTES NFR BLD: 0.35 K/UL
MONOCYTES NFR BLD: 11 % (ref 0–4)
NEUTROPHILS NFR BLD: 47 % (ref 36–66)
NEUTS SEG NFR BLD: 1.51 K/UL
PLATELET # BLD AUTO: 215 K/UL (ref 140–440)
PMV BLD AUTO: 9.1 FL (ref 7.5–11.1)
POTASSIUM SERPL-SCNC: 4.1 MMOL/L (ref 3.5–5.1)
PROT SERPL-MCNC: 7.2 G/DL (ref 6.4–8.2)
RBC # BLD AUTO: 4.55 M/UL (ref 4.2–5.4)
SODIUM SERPL-SCNC: 142 MMOL/L (ref 136–145)
T4 FREE SERPL-MCNC: 0.8 NG/DL (ref 0.9–1.8)
TSH SERPL DL<=0.05 MIU/L-ACNC: 18.3 UIU/ML (ref 0.27–4.2)
WBC OTHER # BLD: 3.2 K/UL (ref 4–10.5)

## 2025-01-13 PROCEDURE — 85025 COMPLETE CBC W/AUTO DIFF WBC: CPT

## 2025-01-13 PROCEDURE — 84439 ASSAY OF FREE THYROXINE: CPT

## 2025-01-13 PROCEDURE — 80053 COMPREHEN METABOLIC PANEL: CPT

## 2025-01-13 PROCEDURE — 84443 ASSAY THYROID STIM HORMONE: CPT

## 2025-01-13 PROCEDURE — 36415 COLL VENOUS BLD VENIPUNCTURE: CPT

## 2025-01-13 RX ORDER — FAMOTIDINE 10 MG/ML
20 INJECTION, SOLUTION INTRAVENOUS
OUTPATIENT
Start: 2025-02-04

## 2025-01-13 RX ORDER — DIPHENHYDRAMINE HYDROCHLORIDE 50 MG/ML
50 INJECTION INTRAMUSCULAR; INTRAVENOUS
Status: CANCELLED | OUTPATIENT
Start: 2025-01-14

## 2025-01-13 RX ORDER — ALBUTEROL SULFATE 90 UG/1
4 INHALANT RESPIRATORY (INHALATION) PRN
Status: CANCELLED | OUTPATIENT
Start: 2025-01-14

## 2025-01-13 RX ORDER — ONDANSETRON 2 MG/ML
8 INJECTION INTRAMUSCULAR; INTRAVENOUS
OUTPATIENT
Start: 2025-02-04

## 2025-01-13 RX ORDER — FAMOTIDINE 10 MG/ML
20 INJECTION, SOLUTION INTRAVENOUS
Status: CANCELLED | OUTPATIENT
Start: 2025-01-14

## 2025-01-13 RX ORDER — SODIUM CHLORIDE 9 MG/ML
INJECTION, SOLUTION INTRAVENOUS CONTINUOUS
OUTPATIENT
Start: 2025-02-04

## 2025-01-13 RX ORDER — EPINEPHRINE 1 MG/ML
0.3 INJECTION, SOLUTION, CONCENTRATE INTRAVENOUS PRN
Status: CANCELLED | OUTPATIENT
Start: 2025-01-14

## 2025-01-13 RX ORDER — SODIUM CHLORIDE 9 MG/ML
5-250 INJECTION, SOLUTION INTRAVENOUS PRN
Status: CANCELLED | OUTPATIENT
Start: 2025-01-14

## 2025-01-13 RX ORDER — ACETAMINOPHEN 325 MG/1
650 TABLET ORAL
Status: CANCELLED | OUTPATIENT
Start: 2025-01-14

## 2025-01-13 RX ORDER — SODIUM CHLORIDE 9 MG/ML
5-250 INJECTION, SOLUTION INTRAVENOUS PRN
OUTPATIENT
Start: 2025-02-04

## 2025-01-13 RX ORDER — HYDROCORTISONE SODIUM SUCCINATE 100 MG/2ML
100 INJECTION INTRAMUSCULAR; INTRAVENOUS
OUTPATIENT
Start: 2025-02-04

## 2025-01-13 RX ORDER — HEPARIN SODIUM (PORCINE) LOCK FLUSH IV SOLN 100 UNIT/ML 100 UNIT/ML
500 SOLUTION INTRAVENOUS PRN
OUTPATIENT
Start: 2025-02-04

## 2025-01-13 RX ORDER — DIPHENHYDRAMINE HYDROCHLORIDE 50 MG/ML
50 INJECTION INTRAMUSCULAR; INTRAVENOUS
OUTPATIENT
Start: 2025-02-04

## 2025-01-13 RX ORDER — HEPARIN SODIUM (PORCINE) LOCK FLUSH IV SOLN 100 UNIT/ML 100 UNIT/ML
500 SOLUTION INTRAVENOUS PRN
Status: CANCELLED | OUTPATIENT
Start: 2025-01-14

## 2025-01-13 RX ORDER — EPINEPHRINE 1 MG/ML
0.3 INJECTION, SOLUTION, CONCENTRATE INTRAVENOUS PRN
OUTPATIENT
Start: 2025-02-04

## 2025-01-13 RX ORDER — SODIUM CHLORIDE 0.9 % (FLUSH) 0.9 %
5-40 SYRINGE (ML) INJECTION PRN
OUTPATIENT
Start: 2025-02-04

## 2025-01-13 RX ORDER — ONDANSETRON 2 MG/ML
8 INJECTION INTRAMUSCULAR; INTRAVENOUS
Status: CANCELLED | OUTPATIENT
Start: 2025-01-14

## 2025-01-13 RX ORDER — MEPERIDINE HYDROCHLORIDE 50 MG/ML
12.5 INJECTION INTRAMUSCULAR; INTRAVENOUS; SUBCUTANEOUS PRN
Status: CANCELLED | OUTPATIENT
Start: 2025-01-14

## 2025-01-13 RX ORDER — HYDROCORTISONE SODIUM SUCCINATE 100 MG/2ML
100 INJECTION INTRAMUSCULAR; INTRAVENOUS
Status: CANCELLED | OUTPATIENT
Start: 2025-01-14

## 2025-01-13 RX ORDER — ACETAMINOPHEN 325 MG/1
650 TABLET ORAL
OUTPATIENT
Start: 2025-02-04

## 2025-01-13 RX ORDER — SODIUM CHLORIDE 0.9 % (FLUSH) 0.9 %
5-40 SYRINGE (ML) INJECTION PRN
Status: CANCELLED | OUTPATIENT
Start: 2025-01-14

## 2025-01-13 RX ORDER — ALBUTEROL SULFATE 90 UG/1
4 INHALANT RESPIRATORY (INHALATION) PRN
OUTPATIENT
Start: 2025-02-04

## 2025-01-13 RX ORDER — SODIUM CHLORIDE 9 MG/ML
INJECTION, SOLUTION INTRAVENOUS CONTINUOUS
Status: CANCELLED | OUTPATIENT
Start: 2025-01-14

## 2025-01-13 RX ORDER — MEPERIDINE HYDROCHLORIDE 50 MG/ML
12.5 INJECTION INTRAMUSCULAR; INTRAVENOUS; SUBCUTANEOUS PRN
OUTPATIENT
Start: 2025-02-04

## 2025-01-14 ENCOUNTER — CLINICAL DOCUMENTATION (OUTPATIENT)
Dept: ONCOLOGY | Age: 60
End: 2025-01-14

## 2025-01-14 ENCOUNTER — HOSPITAL ENCOUNTER (OUTPATIENT)
Dept: INFUSION THERAPY | Age: 60
Discharge: HOME | End: 2025-01-14
Payer: MEDICAID

## 2025-01-14 VITALS
SYSTOLIC BLOOD PRESSURE: 141 MMHG | OXYGEN SATURATION: 100 % | BODY MASS INDEX: 26.64 KG/M2 | HEART RATE: 66 BPM | DIASTOLIC BLOOD PRESSURE: 84 MMHG | TEMPERATURE: 97.9 F | WEIGHT: 175.8 LBS | HEIGHT: 68 IN

## 2025-01-14 DIAGNOSIS — C15.9 ESOPHAGEAL ADENOCARCINOMA (HCC): Primary | ICD-10-CM

## 2025-01-14 DIAGNOSIS — Z11.59 SCREENING FOR VIRAL DISEASE: ICD-10-CM

## 2025-01-14 DIAGNOSIS — Z79.899 ENCOUNTER FOR LONG-TERM (CURRENT) USE OF MEDICATIONS: ICD-10-CM

## 2025-01-14 PROCEDURE — 2500000003 HC RX 250 WO HCPCS: Performed by: INTERNAL MEDICINE

## 2025-01-14 PROCEDURE — 2580000003 HC RX 258: Performed by: INTERNAL MEDICINE

## 2025-01-14 PROCEDURE — 6360000002 HC RX W HCPCS: Performed by: INTERNAL MEDICINE

## 2025-01-14 PROCEDURE — 96413 CHEMO IV INFUSION 1 HR: CPT

## 2025-01-14 RX ORDER — LEVOTHYROXINE SODIUM 25 UG/1
25 TABLET ORAL DAILY
Qty: 30 TABLET | Refills: 3 | Status: SHIPPED | OUTPATIENT
Start: 2025-01-14

## 2025-01-14 RX ORDER — SODIUM CHLORIDE 0.9 % (FLUSH) 0.9 %
5-40 SYRINGE (ML) INJECTION PRN
Status: DISCONTINUED | OUTPATIENT
Start: 2025-01-14 | End: 2025-01-15 | Stop reason: HOSPADM

## 2025-01-14 RX ADMIN — SODIUM CHLORIDE 200 MG: 9 INJECTION, SOLUTION INTRAVENOUS at 10:40

## 2025-01-14 RX ADMIN — SODIUM CHLORIDE, PRESERVATIVE FREE 30 ML: 5 INJECTION INTRAVENOUS at 11:25

## 2025-01-14 RX ADMIN — SODIUM CHLORIDE, PRESERVATIVE FREE 10 ML: 5 INJECTION INTRAVENOUS at 11:23

## 2025-01-14 NOTE — PROGRESS NOTES
Status appropriately assessed and documented. All required labs and results reviewed. Treatment approved by provider. Treatment orders and medications verified by 2 Registered Nurses where applicable. Treatment plan was confirmed with patient prior to administration, and educated the need to report any treatment-related symptoms      Ambulated to infusion area, here today for treatment. No concerns at this time, TSH 18.30, spoke with Dr. Guthrie, already had sent order for synthyroid. Right chest mediport accessed, positive blood return noted. Labs reviewed, TSH 18.30.  Treatment administered as ordered. Call light within reach. Tolerated infusion without incident.  Discharge instructions provided.      Refill for pain meds sent to Dr. Guthrie.

## 2025-01-14 NOTE — PROGRESS NOTES
1/14/25 PT  was in the clinic for a TX, I gave her the 1/27/25 Pet scan info at [facility] arrival time of 10:00 am and NPO 6 hours prior. Plain water only.

## 2025-01-14 NOTE — PROGRESS NOTES
This nurse called the patient @ [phone] to review lab results. Patient was notified that her TSH was greater than 18 on labs from 1/13/25 and the physicain would like for her to begin taking Synthroid 25 mg daily. RX e-scribed to [facility] per physician order. Patient is ok to receive treatment today as planned. Patient verbalized understanding and denies further needs at this time.

## 2025-01-17 ENCOUNTER — TELEPHONE (OUTPATIENT)
Dept: ONCOLOGY | Age: 60
End: 2025-01-17

## 2025-01-17 DIAGNOSIS — C15.9 ESOPHAGEAL ADENOCARCINOMA (HCC): ICD-10-CM

## 2025-01-17 RX ORDER — OXYCODONE HCL 20 MG/ML
10 CONCENTRATE, ORAL ORAL EVERY 4 HOURS PRN
Qty: 30 ML | Refills: 0 | Status: SHIPPED | OUTPATIENT
Start: 2025-01-17 | End: 2025-01-17 | Stop reason: SDUPTHER

## 2025-01-17 RX ORDER — OXYCODONE HCL 20 MG/ML
10 CONCENTRATE, ORAL ORAL EVERY 4 HOURS PRN
Qty: 30 ML | Refills: 0 | Status: SHIPPED | OUTPATIENT
Start: 2025-01-17 | End: 2025-01-27

## 2025-01-17 RX ORDER — OXYCODONE HCL 20 MG/ML
10 CONCENTRATE, ORAL ORAL EVERY 4 HOURS PRN
Qty: 30 ML | Refills: 0 | Status: CANCELLED | OUTPATIENT
Start: 2025-01-17 | End: 2025-01-27

## 2025-01-17 NOTE — TELEPHONE ENCOUNTER
Pharmacy called and lvm that they don't have the Roxicodone liquid prescribed and requesting a new rx or call back to [phone]

## 2025-01-17 NOTE — TELEPHONE ENCOUNTER
Medication was originally sent to [facility] on [address]. Medication should be sent to the [facility] on [facility]. Spoke with Molly @ [facility] on [facility] she states she will put in an order medication will not be in until Monday due to an order needing to be placed for this medication.    Quality 226: Preventive Care And Screening: Tobacco Use: Screening And Cessation Intervention: Patient screened for tobacco and never smoked Detail Level: Detailed Quality 431: Preventive Care And Screening: Unhealthy Alcohol Use - Screening: Patient screened for unhealthy alcohol use using a single question and scores less than 2 times per year Quality 130: Documentation Of Current Medications In The Medical Record: Current Medications Documented

## 2025-01-17 NOTE — TELEPHONE ENCOUNTER
Patient left message requesting a refill for Oxycodone concentrated solution to be sent to [facility] on [facility]. Pending RX to Provider to be sent to pharmacy.

## 2025-01-27 ENCOUNTER — HOSPITAL ENCOUNTER (OUTPATIENT)
Dept: PET IMAGING | Age: 60
Discharge: HOME | End: 2025-01-27
Attending: INTERNAL MEDICINE
Payer: MEDICAID

## 2025-01-27 DIAGNOSIS — C15.9 ESOPHAGEAL ADENOCARCINOMA (HCC): ICD-10-CM

## 2025-01-27 PROCEDURE — 3430000000 HC RX DIAGNOSTIC RADIOPHARMACEUTICAL: Performed by: INTERNAL MEDICINE

## 2025-01-27 PROCEDURE — 2500000003 HC RX 250 WO HCPCS: Performed by: INTERNAL MEDICINE

## 2025-01-27 PROCEDURE — 78815 PET IMAGE W/CT SKULL-THIGH: CPT

## 2025-01-27 PROCEDURE — A9609 HC RX DIAGNOSTIC RADIOPHARMACEUTICAL: HCPCS | Performed by: INTERNAL MEDICINE

## 2025-01-27 RX ORDER — FLUDEOXYGLUCOSE F 18 200 MCI/ML
11.81 INJECTION, SOLUTION INTRAVENOUS
Status: COMPLETED | OUTPATIENT
Start: 2025-01-27 | End: 2025-01-27

## 2025-01-27 RX ORDER — SODIUM CHLORIDE 0.9 % (FLUSH) 0.9 %
5-40 SYRINGE (ML) INJECTION 2 TIMES DAILY
Status: DISCONTINUED | OUTPATIENT
Start: 2025-01-27 | End: 2025-01-28 | Stop reason: HOSPADM

## 2025-01-27 RX ADMIN — SODIUM CHLORIDE, PRESERVATIVE FREE 10 ML: 5 INJECTION INTRAVENOUS at 10:58

## 2025-01-27 RX ADMIN — FLUDEOXYGLUCOSE F 18 11.81 MILLICURIE: 200 INJECTION, SOLUTION INTRAVENOUS at 10:58

## 2025-02-03 ENCOUNTER — HOSPITAL ENCOUNTER (OUTPATIENT)
Dept: INFUSION THERAPY | Age: 60
Discharge: HOME | End: 2025-02-03
Payer: MEDICAID

## 2025-02-03 DIAGNOSIS — Z79.899 ENCOUNTER FOR LONG-TERM (CURRENT) USE OF MEDICATIONS: ICD-10-CM

## 2025-02-03 DIAGNOSIS — Z11.59 SCREENING FOR VIRAL DISEASE: ICD-10-CM

## 2025-02-03 DIAGNOSIS — C15.9 ESOPHAGEAL ADENOCARCINOMA (HCC): ICD-10-CM

## 2025-02-03 LAB
ALBUMIN SERPL-MCNC: 3.9 G/DL (ref 3.4–5)
ALBUMIN/GLOB SERPL: 1.4 {RATIO} (ref 1.1–2.2)
ALP SERPL-CCNC: 87 U/L (ref 40–129)
ALT SERPL-CCNC: 16 U/L (ref 10–40)
ANION GAP SERPL CALCULATED.3IONS-SCNC: 9 MMOL/L (ref 9–17)
AST SERPL-CCNC: 18 U/L (ref 15–37)
BASOPHILS # BLD: 0.02 K/UL
BASOPHILS NFR BLD: 1 % (ref 0–1)
BILIRUB SERPL-MCNC: 0.2 MG/DL (ref 0–1)
BUN SERPL-MCNC: 19 MG/DL (ref 7–20)
CALCIUM SERPL-MCNC: 8.8 MG/DL (ref 8.3–10.6)
CHLORIDE SERPL-SCNC: 106 MMOL/L (ref 99–110)
CO2 SERPL-SCNC: 27 MMOL/L (ref 21–32)
CREAT SERPL-MCNC: 1.1 MG/DL (ref 0.6–1.1)
EOSINOPHIL # BLD: 0.27 K/UL
EOSINOPHILS RELATIVE PERCENT: 8 % (ref 0–3)
ERYTHROCYTE [DISTWIDTH] IN BLOOD BY AUTOMATED COUNT: 13.5 % (ref 11.7–14.9)
GFR, ESTIMATED: 52 ML/MIN/1.73M2
GLUCOSE SERPL-MCNC: 106 MG/DL (ref 74–99)
HCT VFR BLD AUTO: 40.5 % (ref 37–47)
HGB BLD-MCNC: 13.1 G/DL (ref 12.5–16)
LYMPHOCYTES NFR BLD: 1.11 K/UL
LYMPHOCYTES RELATIVE PERCENT: 32 % (ref 24–44)
MCH RBC QN AUTO: 29.3 PG (ref 27–31)
MCHC RBC AUTO-ENTMCNC: 32.3 G/DL (ref 32–36)
MCV RBC AUTO: 90.6 FL (ref 78–100)
MONOCYTES NFR BLD: 0.34 K/UL
MONOCYTES NFR BLD: 10 % (ref 0–4)
NEUTROPHILS NFR BLD: 50 % (ref 36–66)
NEUTS SEG NFR BLD: 1.77 K/UL
PLATELET # BLD AUTO: 207 K/UL (ref 140–440)
PMV BLD AUTO: 9.2 FL (ref 7.5–11.1)
POTASSIUM SERPL-SCNC: 4.4 MMOL/L (ref 3.5–5.1)
PROT SERPL-MCNC: 6.8 G/DL (ref 6.4–8.2)
RBC # BLD AUTO: 4.47 M/UL (ref 4.2–5.4)
SODIUM SERPL-SCNC: 142 MMOL/L (ref 136–145)
WBC OTHER # BLD: 3.5 K/UL (ref 4–10.5)

## 2025-02-03 PROCEDURE — 85025 COMPLETE CBC W/AUTO DIFF WBC: CPT

## 2025-02-03 PROCEDURE — 36415 COLL VENOUS BLD VENIPUNCTURE: CPT

## 2025-02-03 PROCEDURE — 80053 COMPREHEN METABOLIC PANEL: CPT

## 2025-02-04 ENCOUNTER — OFFICE VISIT (OUTPATIENT)
Dept: ONCOLOGY | Age: 60
End: 2025-02-04
Payer: MEDICAID

## 2025-02-04 ENCOUNTER — HOSPITAL ENCOUNTER (OUTPATIENT)
Dept: INFUSION THERAPY | Age: 60
Discharge: HOME | End: 2025-02-04
Payer: MEDICAID

## 2025-02-04 VITALS
BODY MASS INDEX: 27.25 KG/M2 | HEART RATE: 93 BPM | WEIGHT: 179.8 LBS | SYSTOLIC BLOOD PRESSURE: 151 MMHG | DIASTOLIC BLOOD PRESSURE: 97 MMHG | HEIGHT: 68 IN | OXYGEN SATURATION: 99 % | TEMPERATURE: 97.9 F

## 2025-02-04 DIAGNOSIS — Z79.899 ENCOUNTER FOR LONG-TERM (CURRENT) USE OF MEDICATIONS: Primary | ICD-10-CM

## 2025-02-04 DIAGNOSIS — Z11.59 SCREENING FOR VIRAL DISEASE: ICD-10-CM

## 2025-02-04 DIAGNOSIS — C15.9 ESOPHAGEAL ADENOCARCINOMA (HCC): Primary | ICD-10-CM

## 2025-02-04 DIAGNOSIS — C15.9 ESOPHAGEAL ADENOCARCINOMA (HCC): ICD-10-CM

## 2025-02-04 PROCEDURE — 2500000003 HC RX 250 WO HCPCS: Performed by: INTERNAL MEDICINE

## 2025-02-04 PROCEDURE — 3017F COLORECTAL CA SCREEN DOC REV: CPT | Performed by: INTERNAL MEDICINE

## 2025-02-04 PROCEDURE — 99214 OFFICE O/P EST MOD 30 MIN: CPT | Performed by: INTERNAL MEDICINE

## 2025-02-04 PROCEDURE — 96413 CHEMO IV INFUSION 1 HR: CPT

## 2025-02-04 PROCEDURE — 6360000002 HC RX W HCPCS: Performed by: INTERNAL MEDICINE

## 2025-02-04 PROCEDURE — 1036F TOBACCO NON-USER: CPT | Performed by: INTERNAL MEDICINE

## 2025-02-04 PROCEDURE — G8427 DOCREV CUR MEDS BY ELIG CLIN: HCPCS | Performed by: INTERNAL MEDICINE

## 2025-02-04 PROCEDURE — 2580000003 HC RX 258: Performed by: INTERNAL MEDICINE

## 2025-02-04 PROCEDURE — G8417 CALC BMI ABV UP PARAM F/U: HCPCS | Performed by: INTERNAL MEDICINE

## 2025-02-04 RX ORDER — OXYCODONE HYDROCHLORIDE 10 MG/1
10 TABLET ORAL EVERY 6 HOURS PRN
Qty: 60 TABLET | Refills: 0 | Status: SHIPPED | OUTPATIENT
Start: 2025-02-04 | End: 2025-02-19

## 2025-02-04 RX ORDER — SODIUM CHLORIDE 0.9 % (FLUSH) 0.9 %
5-40 SYRINGE (ML) INJECTION PRN
Status: DISCONTINUED | OUTPATIENT
Start: 2025-02-04 | End: 2025-02-05 | Stop reason: HOSPADM

## 2025-02-04 RX ADMIN — Medication 20 ML: at 12:19

## 2025-02-04 RX ADMIN — SODIUM CHLORIDE 200 MG: 9 INJECTION, SOLUTION INTRAVENOUS at 11:40

## 2025-02-04 NOTE — PROGRESS NOTES
Patient to Infusion suite for D1C18 of treatment.   MP/accessed using sterile technique, with brisk blood return.   Pt without any new issues to inform physician of, labs and vitals within treatment range.     Orders released and given.  Pt tolerated well, declined paperwork, left ambulatory.

## 2025-02-04 NOTE — PROGRESS NOTES
MA Rooming Questions  Patient: Denisa Basurto  MRN: [id number]    Date: 2/4/2025        1. Do you have any new issues?   no         2. Do you need any refills on medications?    yes - Pain meds wants to discuss pill form     3. Have you had any imaging done since your last visit?   yes - Pet Scan 1/27    4. Have you been hospitalized or seen in the emergency room since your last visit here?   no    5. Did the patient have a depression screening completed today? No    No data recorded     PHQ-9 Given to (if applicable):               PHQ-9 Score (if applicable):                     [] Positive     []  Negative              Does question #9 need addressed (if applicable)                     [] Yes    []  No               Haylie Matos MA      
Stable right greater than left adrenal adenomas.    PET/CT on 1/27/25 showed findings overall consistent with partial treatment response. Interval resolution of the intense uptake at the gastroesophageal junction. Interval decrease in uptake associated with the cavitary left upper lobe nodule. Interval decrease in uptake associated with the solid left upper lobe pulmonary nodule. Interval resolution of the increased axillary lymph node uptake.    On February 4, 2025, she presented to me for follow up. 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    She is here for close monitoring of toxicity and side effects from immunotherapy. She is s/p 17th cycle of chemo and she is tolerating current chemo well. She doesn't encounter any major side effects from immunotherapy. I recommend to continue with immunotherapy and I will give her 18th cycle of immunotherapy today.     Reviewed labs done on 2/3/25. Her TSH was 18.3 and T4 was 0.8 on 1/13/25. We started levothyroxine since 1/14/25. Will monitor TSH with reflex every 6 weekly.     Reviewed findings on PET/CT done on 1/27/25. She is achieving very good response to pembrolizumab. Recommend her to have repeat PET/CT in 6 months.     She has decided to continue with immunotherapy and prefers to delay surgery as well as SBRT for lung low grade NET.      D/W oncologist at [facility] Dr. Hernandez before. Will plan for laparoscopic evaluation after neoadjuvant immunotherapy before surgery. Since patient is not considering for surgery for now. Dr. Hernandez will see her as needed basis.     Keytruda induced arthralgia/arthritis - since her pain hasn't controlled well, I will increase oxycodone liquid to 10 mg Q4 hourly (she was on 5 mg Q4 prn before). I asked her to take it prn. I will ask her to continue with naproxen 250 mg BID prn.     Constipation - narcotics induced. Will start senna liquid form.

## 2025-02-21 DIAGNOSIS — Z79.899 ENCOUNTER FOR LONG-TERM (CURRENT) USE OF MEDICATIONS: ICD-10-CM

## 2025-02-21 DIAGNOSIS — Z11.59 SCREENING FOR VIRAL DISEASE: ICD-10-CM

## 2025-02-21 DIAGNOSIS — C15.9 ESOPHAGEAL ADENOCARCINOMA (HCC): Primary | ICD-10-CM

## 2025-02-24 ENCOUNTER — HOSPITAL ENCOUNTER (OUTPATIENT)
Dept: INFUSION THERAPY | Age: 60
Discharge: HOME | End: 2025-02-24
Payer: MEDICAID

## 2025-02-24 ENCOUNTER — OFFICE VISIT (OUTPATIENT)
Dept: ONCOLOGY | Age: 60
End: 2025-02-24
Payer: MEDICAID

## 2025-02-24 VITALS
HEIGHT: 68 IN | DIASTOLIC BLOOD PRESSURE: 95 MMHG | OXYGEN SATURATION: 96 % | RESPIRATION RATE: 16 BRPM | HEART RATE: 80 BPM | TEMPERATURE: 97.4 F | WEIGHT: 185 LBS | BODY MASS INDEX: 28.04 KG/M2 | SYSTOLIC BLOOD PRESSURE: 154 MMHG

## 2025-02-24 DIAGNOSIS — Z11.59 SCREENING FOR VIRAL DISEASE: ICD-10-CM

## 2025-02-24 DIAGNOSIS — C15.9 ESOPHAGEAL ADENOCARCINOMA (HCC): ICD-10-CM

## 2025-02-24 DIAGNOSIS — C15.9 ESOPHAGEAL ADENOCARCINOMA (HCC): Primary | ICD-10-CM

## 2025-02-24 DIAGNOSIS — Z79.899 ENCOUNTER FOR LONG-TERM (CURRENT) USE OF MEDICATIONS: ICD-10-CM

## 2025-02-24 LAB
ALBUMIN SERPL-MCNC: 3.9 G/DL (ref 3.4–5)
ALBUMIN/GLOB SERPL: 1.2 {RATIO} (ref 1.1–2.2)
ALP SERPL-CCNC: 86 U/L (ref 40–129)
ALT SERPL-CCNC: 10 U/L (ref 10–40)
ANION GAP SERPL CALCULATED.3IONS-SCNC: 12 MMOL/L (ref 9–17)
AST SERPL-CCNC: 20 U/L (ref 15–37)
BASOPHILS # BLD: 0.06 K/UL
BASOPHILS NFR BLD: 1 % (ref 0–1)
BILIRUB SERPL-MCNC: <0.2 MG/DL (ref 0–1)
BUN SERPL-MCNC: 25 MG/DL (ref 7–20)
CALCIUM SERPL-MCNC: 9.8 MG/DL (ref 8.3–10.6)
CHLORIDE SERPL-SCNC: 103 MMOL/L (ref 99–110)
CO2 SERPL-SCNC: 23 MMOL/L (ref 21–32)
CREAT SERPL-MCNC: 1.1 MG/DL (ref 0.6–1.1)
EOSINOPHIL # BLD: 0.26 K/UL
EOSINOPHILS RELATIVE PERCENT: 6 % (ref 0–3)
ERYTHROCYTE [DISTWIDTH] IN BLOOD BY AUTOMATED COUNT: 13.1 % (ref 11.7–14.9)
GFR, ESTIMATED: 49 ML/MIN/1.73M2
GLUCOSE SERPL-MCNC: 99 MG/DL (ref 74–99)
HCT VFR BLD AUTO: 41 % (ref 37–47)
HGB BLD-MCNC: 13.4 G/DL (ref 12.5–16)
LYMPHOCYTES NFR BLD: 0.93 K/UL
LYMPHOCYTES RELATIVE PERCENT: 22 % (ref 24–44)
MCH RBC QN AUTO: 29 PG (ref 27–31)
MCHC RBC AUTO-ENTMCNC: 32.7 G/DL (ref 32–36)
MCV RBC AUTO: 88.7 FL (ref 78–100)
MONOCYTES NFR BLD: 0.29 K/UL
MONOCYTES NFR BLD: 7 % (ref 0–4)
NEUTROPHILS NFR BLD: 64 % (ref 36–66)
NEUTS SEG NFR BLD: 2.7 K/UL
PLATELET # BLD AUTO: 199 K/UL (ref 140–440)
PMV BLD AUTO: 9.5 FL (ref 7.5–11.1)
POTASSIUM SERPL-SCNC: 4.6 MMOL/L (ref 3.5–5.1)
PROT SERPL-MCNC: 7.3 G/DL (ref 6.4–8.2)
RBC # BLD AUTO: 4.62 M/UL (ref 4.2–5.4)
SODIUM SERPL-SCNC: 137 MMOL/L (ref 136–145)
TSH SERPL DL<=0.05 MIU/L-ACNC: 10.5 UIU/ML (ref 0.27–4.2)
WBC OTHER # BLD: 4.2 K/UL (ref 4–10.5)

## 2025-02-24 PROCEDURE — 80053 COMPREHEN METABOLIC PANEL: CPT

## 2025-02-24 PROCEDURE — 3017F COLORECTAL CA SCREEN DOC REV: CPT | Performed by: PHYSICIAN ASSISTANT

## 2025-02-24 PROCEDURE — 85025 COMPLETE CBC W/AUTO DIFF WBC: CPT

## 2025-02-24 PROCEDURE — 1036F TOBACCO NON-USER: CPT | Performed by: PHYSICIAN ASSISTANT

## 2025-02-24 PROCEDURE — G8417 CALC BMI ABV UP PARAM F/U: HCPCS | Performed by: PHYSICIAN ASSISTANT

## 2025-02-24 PROCEDURE — 99213 OFFICE O/P EST LOW 20 MIN: CPT | Performed by: PHYSICIAN ASSISTANT

## 2025-02-24 PROCEDURE — 36415 COLL VENOUS BLD VENIPUNCTURE: CPT

## 2025-02-24 PROCEDURE — 84443 ASSAY THYROID STIM HORMONE: CPT

## 2025-02-24 PROCEDURE — 99212 OFFICE O/P EST SF 10 MIN: CPT

## 2025-02-24 PROCEDURE — 84439 ASSAY OF FREE THYROXINE: CPT

## 2025-02-24 PROCEDURE — G8427 DOCREV CUR MEDS BY ELIG CLIN: HCPCS | Performed by: PHYSICIAN ASSISTANT

## 2025-02-24 ASSESSMENT — PATIENT HEALTH QUESTIONNAIRE - PHQ9
SUM OF ALL RESPONSES TO PHQ QUESTIONS 1-9: 0
1. LITTLE INTEREST OR PLEASURE IN DOING THINGS: NOT AT ALL
SUM OF ALL RESPONSES TO PHQ QUESTIONS 1-9: 0
SUM OF ALL RESPONSES TO PHQ9 QUESTIONS 1 & 2: 0
2. FEELING DOWN, DEPRESSED OR HOPELESS: NOT AT ALL
SUM OF ALL RESPONSES TO PHQ QUESTIONS 1-9: 0
SUM OF ALL RESPONSES TO PHQ QUESTIONS 1-9: 0

## 2025-02-24 NOTE — PROGRESS NOTES
MA Rooming Questions  Patient: Denisa Basurto  MRN: [id number]    Date: 2/24/2025        1. Do you have any new issues?   no         2. Do you need any refills on medications?    no    3. Have you had any imaging done since your last visit?   no    4. Have you been hospitalized or seen in the emergency room since your last visit here?   no    5. Did the patient have a depression screening completed today? Yes    No data recorded     PHQ-9 Given to (if applicable):               PHQ-9 Score (if applicable):                     [] Positive     []  Negative              Does question #9 need addressed (if applicable)                     [] Yes    []  No               Jesenia Ahumada CMA      
(Female Patients Only)  Musculoskeletal:  No muscle pain, No swollen joints, No joint redness, No bone pain, No spine tenderness.  Skin:  No rash, No nodules, No pruritus, No lesions.  Neurologic:  No confusion, No seizures, No syncope, No tremor, No speech change, No headache, No hiccups, No abnormal gait, No sensory changes, No weakness.  Psychiatric:  No depression, No anxiety, Concentration normal.  Endocrine:  No polyuria, No polydipsia, No hot flashes, No thyroid symptoms.  Hematologic:  No epistaxis, No gingival bleeding, No petechiae, No ecchymosis.  Lymphatic:  No lymphadenopathy, No lymphedema.  Allergy / Immunologic:  No eczema, No frequent mucous infections, No frequent respiratory infections, No recurrent urticarial, No frequent skin infections.     Vital Signs: There were no vitals taken for this visit.     Physical Exam:  CONSTITUTIONAL: awake, alert, cooperative, no apparent distress   EYES: pupils equal, round and reactive to light, sclera clear, normal conjunctiva  ENT: Normocephalic, without obvious abnormality, atraumatic  NECK: supple, symmetrical, no jugular venous distension, no carotid bruits   HEMATOLOGIC/LYMPHATIC: no cervical, supraclavicular or axillary lymphadenopathy   LUNGS: VBS, no wheezes, no increased work of breathing, no rhonchi, clear to auscultation, no crackles,    CARDIOVASCULAR: regular rate and rhythm, normal S1 and S2, no murmur noted  ABDOMEN: normal bowel sounds x 4, soft, non-distended, non-tender, no masses palpated, no hepatosplenomegaly   MUSCULOSKELETAL: full range of motion noted, tone is normal  NEUROLOGIC: awake, alert, oriented to name, place and time. Motor skills grossly intact.   SKIN: appears intact, normal skin color, normal texture, normal turgor, no jaundice.   EXTREMITIES: no clubbing, no leg swelling, no LE edema, no cyanosis,       Labs:  Hematology:  Lab Results   Component Value Date    WBC 3.5 (L) 02/03/2025    RBC 4.47 02/03/2025    HGB 13.1

## 2025-02-25 ENCOUNTER — HOSPITAL ENCOUNTER (OUTPATIENT)
Dept: INFUSION THERAPY | Age: 60
Discharge: HOME | End: 2025-02-25
Payer: MEDICAID

## 2025-02-25 VITALS
OXYGEN SATURATION: 99 % | WEIGHT: 184.2 LBS | DIASTOLIC BLOOD PRESSURE: 94 MMHG | TEMPERATURE: 97.8 F | SYSTOLIC BLOOD PRESSURE: 128 MMHG | HEART RATE: 74 BPM | BODY MASS INDEX: 28.91 KG/M2 | HEIGHT: 67 IN

## 2025-02-25 DIAGNOSIS — Z79.899 ENCOUNTER FOR LONG-TERM (CURRENT) USE OF MEDICATIONS: Primary | ICD-10-CM

## 2025-02-25 DIAGNOSIS — C15.9 ESOPHAGEAL ADENOCARCINOMA (HCC): ICD-10-CM

## 2025-02-25 DIAGNOSIS — Z11.59 SCREENING FOR VIRAL DISEASE: ICD-10-CM

## 2025-02-25 LAB — T4 FREE SERPL-MCNC: 0.9 NG/DL (ref 0.9–1.8)

## 2025-02-25 PROCEDURE — 96413 CHEMO IV INFUSION 1 HR: CPT

## 2025-02-25 PROCEDURE — 2500000003 HC RX 250 WO HCPCS: Performed by: INTERNAL MEDICINE

## 2025-02-25 PROCEDURE — 6360000002 HC RX W HCPCS: Performed by: INTERNAL MEDICINE

## 2025-02-25 PROCEDURE — 2580000003 HC RX 258: Performed by: INTERNAL MEDICINE

## 2025-02-25 RX ORDER — ONDANSETRON 2 MG/ML
8 INJECTION INTRAMUSCULAR; INTRAVENOUS
Status: CANCELLED | OUTPATIENT
Start: 2025-02-25

## 2025-02-25 RX ORDER — DIPHENHYDRAMINE HYDROCHLORIDE 50 MG/ML
50 INJECTION INTRAMUSCULAR; INTRAVENOUS
Status: CANCELLED | OUTPATIENT
Start: 2025-02-25

## 2025-02-25 RX ORDER — SODIUM CHLORIDE 0.9 % (FLUSH) 0.9 %
5-40 SYRINGE (ML) INJECTION PRN
Status: CANCELLED | OUTPATIENT
Start: 2025-02-25

## 2025-02-25 RX ORDER — SODIUM CHLORIDE 0.9 % (FLUSH) 0.9 %
5-40 SYRINGE (ML) INJECTION PRN
Status: DISCONTINUED | OUTPATIENT
Start: 2025-02-25 | End: 2025-02-26 | Stop reason: HOSPADM

## 2025-02-25 RX ORDER — ALBUTEROL SULFATE 90 UG/1
4 INHALANT RESPIRATORY (INHALATION) PRN
Status: CANCELLED | OUTPATIENT
Start: 2025-02-25

## 2025-02-25 RX ORDER — EPINEPHRINE 1 MG/ML
0.3 INJECTION, SOLUTION, CONCENTRATE INTRAVENOUS PRN
Status: CANCELLED | OUTPATIENT
Start: 2025-02-25

## 2025-02-25 RX ORDER — MEPERIDINE HYDROCHLORIDE 50 MG/ML
12.5 INJECTION INTRAMUSCULAR; INTRAVENOUS; SUBCUTANEOUS PRN
Status: CANCELLED | OUTPATIENT
Start: 2025-02-25

## 2025-02-25 RX ORDER — ACETAMINOPHEN 325 MG/1
650 TABLET ORAL
Status: CANCELLED | OUTPATIENT
Start: 2025-02-25

## 2025-02-25 RX ORDER — HEPARIN SODIUM (PORCINE) LOCK FLUSH IV SOLN 100 UNIT/ML 100 UNIT/ML
500 SOLUTION INTRAVENOUS PRN
Status: CANCELLED | OUTPATIENT
Start: 2025-02-25

## 2025-02-25 RX ORDER — LEVOTHYROXINE SODIUM 50 UG/1
50 TABLET ORAL DAILY
Qty: 30 TABLET | Refills: 3 | Status: SHIPPED | OUTPATIENT
Start: 2025-02-25

## 2025-02-25 RX ORDER — SODIUM CHLORIDE 9 MG/ML
INJECTION, SOLUTION INTRAVENOUS CONTINUOUS
Status: CANCELLED | OUTPATIENT
Start: 2025-02-25

## 2025-02-25 RX ORDER — FAMOTIDINE 10 MG/ML
20 INJECTION, SOLUTION INTRAVENOUS
Status: CANCELLED | OUTPATIENT
Start: 2025-02-25

## 2025-02-25 RX ORDER — SODIUM CHLORIDE 9 MG/ML
5-250 INJECTION, SOLUTION INTRAVENOUS PRN
Status: CANCELLED | OUTPATIENT
Start: 2025-02-25

## 2025-02-25 RX ORDER — HYDROCORTISONE SODIUM SUCCINATE 100 MG/2ML
100 INJECTION INTRAMUSCULAR; INTRAVENOUS
Status: CANCELLED | OUTPATIENT
Start: 2025-02-25

## 2025-02-25 RX ADMIN — SODIUM CHLORIDE, PRESERVATIVE FREE 20 ML: 5 INJECTION INTRAVENOUS at 12:36

## 2025-02-25 RX ADMIN — SODIUM CHLORIDE 200 MG: 9 INJECTION, SOLUTION INTRAVENOUS at 11:59

## 2025-02-25 ASSESSMENT — PAIN DESCRIPTION - LOCATION: LOCATION: GENERALIZED

## 2025-02-25 ASSESSMENT — PAIN SCALES - GENERAL: PAINLEVEL_OUTOF10: 4

## 2025-02-25 NOTE — PROGRESS NOTES
Patient in clinic today for treatment. This nurse spoke to the patient and advised that the physician would like to increase her synthroid dosage to 50 mcg daily. New Rx e-scribed and the patient verbalized understanding

## 2025-03-13 DIAGNOSIS — Z11.59 SCREENING FOR VIRAL DISEASE: ICD-10-CM

## 2025-03-13 DIAGNOSIS — C15.9 ESOPHAGEAL ADENOCARCINOMA (HCC): Primary | ICD-10-CM

## 2025-03-13 DIAGNOSIS — Z79.899 ENCOUNTER FOR LONG-TERM (CURRENT) USE OF MEDICATIONS: ICD-10-CM

## 2025-03-16 DIAGNOSIS — C15.9 ESOPHAGEAL ADENOCARCINOMA (HCC): Primary | ICD-10-CM

## 2025-03-16 DIAGNOSIS — Z11.59 SCREENING FOR VIRAL DISEASE: ICD-10-CM

## 2025-03-16 DIAGNOSIS — Z79.899 ENCOUNTER FOR LONG-TERM (CURRENT) USE OF MEDICATIONS: ICD-10-CM

## 2025-03-16 RX ORDER — SODIUM CHLORIDE 9 MG/ML
INJECTION, SOLUTION INTRAVENOUS CONTINUOUS
OUTPATIENT
Start: 2025-04-08

## 2025-03-16 RX ORDER — SODIUM CHLORIDE 9 MG/ML
5-250 INJECTION, SOLUTION INTRAVENOUS PRN
OUTPATIENT
Start: 2025-04-08

## 2025-03-16 RX ORDER — ONDANSETRON 2 MG/ML
8 INJECTION INTRAMUSCULAR; INTRAVENOUS
OUTPATIENT
Start: 2025-04-08

## 2025-03-16 RX ORDER — ONDANSETRON 2 MG/ML
8 INJECTION INTRAMUSCULAR; INTRAVENOUS
OUTPATIENT
Start: 2025-03-18

## 2025-03-16 RX ORDER — MEPERIDINE HYDROCHLORIDE 50 MG/ML
12.5 INJECTION INTRAMUSCULAR; INTRAVENOUS; SUBCUTANEOUS PRN
OUTPATIENT
Start: 2025-03-18

## 2025-03-16 RX ORDER — FAMOTIDINE 10 MG/ML
20 INJECTION, SOLUTION INTRAVENOUS
OUTPATIENT
Start: 2025-03-18

## 2025-03-16 RX ORDER — HYDROCORTISONE SODIUM SUCCINATE 100 MG/2ML
100 INJECTION INTRAMUSCULAR; INTRAVENOUS
OUTPATIENT
Start: 2025-03-18

## 2025-03-16 RX ORDER — SODIUM CHLORIDE 9 MG/ML
INJECTION, SOLUTION INTRAVENOUS CONTINUOUS
OUTPATIENT
Start: 2025-03-18

## 2025-03-16 RX ORDER — SODIUM CHLORIDE 0.9 % (FLUSH) 0.9 %
5-40 SYRINGE (ML) INJECTION PRN
OUTPATIENT
Start: 2025-03-18

## 2025-03-16 RX ORDER — ACETAMINOPHEN 325 MG/1
650 TABLET ORAL
OUTPATIENT
Start: 2025-03-18

## 2025-03-16 RX ORDER — MEPERIDINE HYDROCHLORIDE 50 MG/ML
12.5 INJECTION INTRAMUSCULAR; INTRAVENOUS; SUBCUTANEOUS PRN
OUTPATIENT
Start: 2025-04-08

## 2025-03-16 RX ORDER — EPINEPHRINE 1 MG/ML
0.3 INJECTION, SOLUTION, CONCENTRATE INTRAVENOUS PRN
OUTPATIENT
Start: 2025-04-08

## 2025-03-16 RX ORDER — DIPHENHYDRAMINE HYDROCHLORIDE 50 MG/ML
50 INJECTION INTRAMUSCULAR; INTRAVENOUS
OUTPATIENT
Start: 2025-03-18

## 2025-03-16 RX ORDER — HEPARIN SODIUM (PORCINE) LOCK FLUSH IV SOLN 100 UNIT/ML 100 UNIT/ML
500 SOLUTION INTRAVENOUS PRN
OUTPATIENT
Start: 2025-03-18

## 2025-03-16 RX ORDER — SODIUM CHLORIDE 9 MG/ML
5-250 INJECTION, SOLUTION INTRAVENOUS PRN
OUTPATIENT
Start: 2025-03-18

## 2025-03-16 RX ORDER — FAMOTIDINE 10 MG/ML
20 INJECTION, SOLUTION INTRAVENOUS
OUTPATIENT
Start: 2025-04-08

## 2025-03-16 RX ORDER — HYDROCORTISONE SODIUM SUCCINATE 100 MG/2ML
100 INJECTION INTRAMUSCULAR; INTRAVENOUS
OUTPATIENT
Start: 2025-04-08

## 2025-03-16 RX ORDER — HEPARIN SODIUM (PORCINE) LOCK FLUSH IV SOLN 100 UNIT/ML 100 UNIT/ML
500 SOLUTION INTRAVENOUS PRN
OUTPATIENT
Start: 2025-04-08

## 2025-03-16 RX ORDER — SODIUM CHLORIDE 0.9 % (FLUSH) 0.9 %
5-40 SYRINGE (ML) INJECTION PRN
OUTPATIENT
Start: 2025-04-08

## 2025-03-16 RX ORDER — DIPHENHYDRAMINE HYDROCHLORIDE 50 MG/ML
50 INJECTION INTRAMUSCULAR; INTRAVENOUS
OUTPATIENT
Start: 2025-04-08

## 2025-03-16 RX ORDER — ALBUTEROL SULFATE 90 UG/1
4 INHALANT RESPIRATORY (INHALATION) PRN
OUTPATIENT
Start: 2025-03-18

## 2025-03-16 RX ORDER — ACETAMINOPHEN 325 MG/1
650 TABLET ORAL
OUTPATIENT
Start: 2025-04-08

## 2025-03-16 RX ORDER — EPINEPHRINE 1 MG/ML
0.3 INJECTION, SOLUTION, CONCENTRATE INTRAVENOUS PRN
OUTPATIENT
Start: 2025-03-18

## 2025-03-16 RX ORDER — ALBUTEROL SULFATE 90 UG/1
4 INHALANT RESPIRATORY (INHALATION) PRN
OUTPATIENT
Start: 2025-04-08

## 2025-03-17 ENCOUNTER — HOSPITAL ENCOUNTER (OUTPATIENT)
Dept: INFUSION THERAPY | Age: 60
Discharge: HOME | End: 2025-03-17
Payer: MEDICAID

## 2025-03-17 DIAGNOSIS — C15.9 ESOPHAGEAL ADENOCARCINOMA (HCC): ICD-10-CM

## 2025-03-17 DIAGNOSIS — Z11.59 SCREENING FOR VIRAL DISEASE: ICD-10-CM

## 2025-03-17 DIAGNOSIS — Z79.899 ENCOUNTER FOR LONG-TERM (CURRENT) USE OF MEDICATIONS: ICD-10-CM

## 2025-03-17 LAB
ALBUMIN SERPL-MCNC: 3.9 G/DL (ref 3.4–5)
ALBUMIN/GLOB SERPL: 1.3 {RATIO} (ref 1.1–2.2)
ALP SERPL-CCNC: 73 U/L (ref 40–129)
ALT SERPL-CCNC: 8 U/L (ref 10–40)
ANION GAP SERPL CALCULATED.3IONS-SCNC: 11 MMOL/L (ref 9–17)
AST SERPL-CCNC: 17 U/L (ref 15–37)
BASOPHILS # BLD: 0.04 K/UL
BASOPHILS NFR BLD: 1 % (ref 0–1)
BILIRUB SERPL-MCNC: 0.2 MG/DL (ref 0–1)
BUN SERPL-MCNC: 24 MG/DL (ref 7–20)
CALCIUM SERPL-MCNC: 8.9 MG/DL (ref 8.3–10.6)
CHLORIDE SERPL-SCNC: 103 MMOL/L (ref 99–110)
CO2 SERPL-SCNC: 27 MMOL/L (ref 21–32)
CREAT SERPL-MCNC: 1.1 MG/DL (ref 0.6–1.1)
EOSINOPHIL # BLD: 0.35 K/UL
EOSINOPHILS RELATIVE PERCENT: 9 % (ref 0–3)
ERYTHROCYTE [DISTWIDTH] IN BLOOD BY AUTOMATED COUNT: 13.7 % (ref 11.7–14.9)
GFR, ESTIMATED: 52 ML/MIN/1.73M2
GLUCOSE SERPL-MCNC: 103 MG/DL (ref 74–99)
HCT VFR BLD AUTO: 41.3 % (ref 37–47)
HGB BLD-MCNC: 13.1 G/DL (ref 12.5–16)
LYMPHOCYTES NFR BLD: 1.05 K/UL
LYMPHOCYTES RELATIVE PERCENT: 26 % (ref 24–44)
MCH RBC QN AUTO: 28.9 PG (ref 27–31)
MCHC RBC AUTO-ENTMCNC: 31.7 G/DL (ref 32–36)
MCV RBC AUTO: 91 FL (ref 78–100)
MONOCYTES NFR BLD: 0.32 K/UL
MONOCYTES NFR BLD: 8 % (ref 0–4)
NEUTROPHILS NFR BLD: 57 % (ref 36–66)
NEUTS SEG NFR BLD: 2.36 K/UL
PLATELET # BLD AUTO: 186 K/UL (ref 140–440)
PMV BLD AUTO: 9.4 FL (ref 7.5–11.1)
POTASSIUM SERPL-SCNC: 3.9 MMOL/L (ref 3.5–5.1)
PROT SERPL-MCNC: 6.9 G/DL (ref 6.4–8.2)
RBC # BLD AUTO: 4.54 M/UL (ref 4.2–5.4)
SODIUM SERPL-SCNC: 141 MMOL/L (ref 136–145)
WBC OTHER # BLD: 4.1 K/UL (ref 4–10.5)

## 2025-03-17 PROCEDURE — 36415 COLL VENOUS BLD VENIPUNCTURE: CPT

## 2025-03-17 PROCEDURE — 80053 COMPREHEN METABOLIC PANEL: CPT

## 2025-03-17 PROCEDURE — 85025 COMPLETE CBC W/AUTO DIFF WBC: CPT

## 2025-03-18 ENCOUNTER — OFFICE VISIT (OUTPATIENT)
Dept: ONCOLOGY | Age: 60
End: 2025-03-18
Payer: MEDICAID

## 2025-03-18 ENCOUNTER — HOSPITAL ENCOUNTER (OUTPATIENT)
Dept: INFUSION THERAPY | Age: 60
Discharge: HOME | End: 2025-03-18
Payer: MEDICAID

## 2025-03-18 VITALS
BODY MASS INDEX: 28.88 KG/M2 | OXYGEN SATURATION: 98 % | HEIGHT: 67 IN | HEART RATE: 83 BPM | SYSTOLIC BLOOD PRESSURE: 133 MMHG | DIASTOLIC BLOOD PRESSURE: 80 MMHG | WEIGHT: 184 LBS | TEMPERATURE: 97.7 F

## 2025-03-18 VITALS
DIASTOLIC BLOOD PRESSURE: 80 MMHG | BODY MASS INDEX: 28.89 KG/M2 | SYSTOLIC BLOOD PRESSURE: 130 MMHG | WEIGHT: 184.08 LBS | HEART RATE: 83 BPM | TEMPERATURE: 97.7 F | OXYGEN SATURATION: 99 % | HEIGHT: 67 IN

## 2025-03-18 DIAGNOSIS — R11.2 CHEMOTHERAPY INDUCED NAUSEA AND VOMITING: ICD-10-CM

## 2025-03-18 DIAGNOSIS — C15.9 ESOPHAGEAL ADENOCARCINOMA (HCC): ICD-10-CM

## 2025-03-18 DIAGNOSIS — Z11.59 SCREENING FOR VIRAL DISEASE: ICD-10-CM

## 2025-03-18 DIAGNOSIS — C15.9 ESOPHAGEAL ADENOCARCINOMA (HCC): Primary | ICD-10-CM

## 2025-03-18 DIAGNOSIS — T45.1X5A CHEMOTHERAPY INDUCED NAUSEA AND VOMITING: ICD-10-CM

## 2025-03-18 DIAGNOSIS — Z79.899 ENCOUNTER FOR LONG-TERM (CURRENT) USE OF MEDICATIONS: Primary | ICD-10-CM

## 2025-03-18 PROCEDURE — 3017F COLORECTAL CA SCREEN DOC REV: CPT | Performed by: INTERNAL MEDICINE

## 2025-03-18 PROCEDURE — G8427 DOCREV CUR MEDS BY ELIG CLIN: HCPCS | Performed by: INTERNAL MEDICINE

## 2025-03-18 PROCEDURE — G8417 CALC BMI ABV UP PARAM F/U: HCPCS | Performed by: INTERNAL MEDICINE

## 2025-03-18 PROCEDURE — 1036F TOBACCO NON-USER: CPT | Performed by: INTERNAL MEDICINE

## 2025-03-18 PROCEDURE — 99214 OFFICE O/P EST MOD 30 MIN: CPT | Performed by: INTERNAL MEDICINE

## 2025-03-18 PROCEDURE — 96413 CHEMO IV INFUSION 1 HR: CPT

## 2025-03-18 PROCEDURE — 2580000003 HC RX 258: Performed by: INTERNAL MEDICINE

## 2025-03-18 PROCEDURE — 6360000002 HC RX W HCPCS: Performed by: INTERNAL MEDICINE

## 2025-03-18 PROCEDURE — 2500000003 HC RX 250 WO HCPCS: Performed by: INTERNAL MEDICINE

## 2025-03-18 RX ORDER — OXYCODONE HYDROCHLORIDE 10 MG/1
10 TABLET ORAL EVERY 6 HOURS PRN
Qty: 60 TABLET | Refills: 0 | Status: SHIPPED | OUTPATIENT
Start: 2025-03-18 | End: 2025-03-18

## 2025-03-18 RX ORDER — MELOXICAM 15 MG/1
15 TABLET ORAL DAILY
Qty: 30 TABLET | Refills: 3 | Status: SHIPPED | OUTPATIENT
Start: 2025-03-18

## 2025-03-18 RX ORDER — OXYCODONE HYDROCHLORIDE 10 MG/1
10 TABLET ORAL EVERY 6 HOURS PRN
Qty: 60 TABLET | Refills: 0 | Status: SHIPPED | OUTPATIENT
Start: 2025-03-18 | End: 2025-04-02

## 2025-03-18 RX ORDER — SODIUM CHLORIDE 0.9 % (FLUSH) 0.9 %
5-40 SYRINGE (ML) INJECTION PRN
Status: DISCONTINUED | OUTPATIENT
Start: 2025-03-18 | End: 2025-03-19 | Stop reason: HOSPADM

## 2025-03-18 RX ADMIN — SODIUM CHLORIDE, PRESERVATIVE FREE 20 ML: 5 INJECTION INTRAVENOUS at 12:42

## 2025-03-18 RX ADMIN — SODIUM CHLORIDE 200 MG: 9 INJECTION, SOLUTION INTRAVENOUS at 12:07

## 2025-03-18 ASSESSMENT — PAIN SCALES - GENERAL: PAINLEVEL_OUTOF10: 4

## 2025-03-18 ASSESSMENT — PAIN DESCRIPTION - LOCATION: LOCATION: GENERALIZED

## 2025-03-18 NOTE — PROGRESS NOTES
MA Rooming Questions  Patient: Denisa Basruto  MRN: [id number]    Date: 3/18/2025        1. Do you have any new issues?   yes - Patient states she is having joint pain frequently.          2. Do you need any refills on medications?    yes - oxycodone and meloxicam     3. Have you had any imaging done since your last visit?   yes - labs 2/24    4. Have you been hospitalized or seen in the emergency room since your last visit here?   no    5. Did the patient have a depression screening completed today? No    No data recorded     PHQ-9 Given to (if applicable):               PHQ-9 Score (if applicable):                     [] Positive     []  Negative              Does question #9 need addressed (if applicable)                     [] Yes    []  No               Shanti Bustamante CMA      
evaluation.     Smoking cessation - she is on nicotine patch. I gave another prescription for patch today.     She stated that her dysphagia is getting some better since starting immunotherapy.     Lung low grade neuroendocrine tumor - following with CT surgery at [facility]. Rad Onc in [facility] recommends SBRT, but she prefers to wait. Second lung nodule biopsy showed benign findings only. Will monitor it's size on repeat scans.     Malignancy related pain - she is on oxycodone 10 mg every 4 - 6 hourly prn. Her pain is controlled well with it and she doesn't require it often. I asked her to take it only when she really needs it. I checked her OARRS report regularly. Since her swallowing is better, I change it to pills form starting from 25. I provide oxycodone 10 mg (60 tablets) today.      She doesn't have any new significant symptoms on today visit.      Past Medical History:     Past Medical History:   Diagnosis Date    Esophageal adenocarcinoma (HCC) 2024    History of blood transfusion           Past Surgery History:    Past Surgical History:   Procedure Laterality Date    ASD REPAIR      1969 repaired hole in heart    PORT SURGERY N/A 2024    PORT INSERTION performed by Everett Cali DO at [facility] OR    TONSILLECTOMY      age 8    WRIST FRACTURE SURGERY Left 2021    LEFT WRIST OPEN REDUCTION INTERNAL FIXATION performed by Guido Walsh DO at [facility] OR     Social History:   Social History     Socioeconomic History    Marital status:      Spouse name: None    Number of children: None    Years of education: None    Highest education level: None   Tobacco Use    Smoking status: Former     Current packs/day: 0.00     Average packs/day: 2.0 packs/day for 35.0 years (70.0 ttl pk-yrs)     Types: Cigarettes     Quit date: 2024     Years since quittin.0    Smokeless tobacco: Never   Vaping Use    Vaping status: Never Used   Substance and Sexual Activity    Alcohol use: Not Currently    Drug

## 2025-03-18 NOTE — PROGRESS NOTES
Ambulated to infusion area, here today for treatment. No concerns at this time. Right chest mediport accessed, positive blood return noted. Labs reviewed, Labs within defined limits. Treatment administered as ordered. Call light within reach. Tolerated infusion without incident.  Discharge instructions declined. RTC 04/07 for labs.    Status appropriately assessed and documented. All required labs and results reviewed. Treatment approved by provider. Treatment orders and medications verified by 2 Registered Nurses where applicable. Treatment plan was confirmed with patient prior to administration, and educated the need to report any treatment-related symptoms

## 2025-04-07 ENCOUNTER — HOSPITAL ENCOUNTER (OUTPATIENT)
Dept: INFUSION THERAPY | Age: 60
Discharge: HOME | End: 2025-04-07
Payer: MEDICAID

## 2025-04-07 DIAGNOSIS — C15.9 ESOPHAGEAL ADENOCARCINOMA (HCC): ICD-10-CM

## 2025-04-07 DIAGNOSIS — Z79.899 ENCOUNTER FOR LONG-TERM (CURRENT) USE OF MEDICATIONS: ICD-10-CM

## 2025-04-07 DIAGNOSIS — Z11.59 SCREENING FOR VIRAL DISEASE: ICD-10-CM

## 2025-04-07 LAB
ALBUMIN SERPL-MCNC: 4 G/DL (ref 3.4–5)
ALBUMIN/GLOB SERPL: 1.3 {RATIO} (ref 1.1–2.2)
ALP SERPL-CCNC: 70 U/L (ref 40–129)
ALT SERPL-CCNC: 11 U/L (ref 10–40)
ANION GAP SERPL CALCULATED.3IONS-SCNC: 12 MMOL/L (ref 9–17)
AST SERPL-CCNC: 19 U/L (ref 15–37)
BASOPHILS # BLD: 0.02 K/UL
BASOPHILS NFR BLD: 0 % (ref 0–1)
BILIRUB SERPL-MCNC: 0.3 MG/DL (ref 0–1)
BUN SERPL-MCNC: 14 MG/DL (ref 7–20)
CALCIUM SERPL-MCNC: 9.2 MG/DL (ref 8.3–10.6)
CHLORIDE SERPL-SCNC: 105 MMOL/L (ref 99–110)
CO2 SERPL-SCNC: 23 MMOL/L (ref 21–32)
CREAT SERPL-MCNC: 1 MG/DL (ref 0.6–1.1)
EOSINOPHIL # BLD: 0.28 K/UL
EOSINOPHILS RELATIVE PERCENT: 6 % (ref 0–3)
ERYTHROCYTE [DISTWIDTH] IN BLOOD BY AUTOMATED COUNT: 13.9 % (ref 11.7–14.9)
GFR, ESTIMATED: 55 ML/MIN/1.73M2
GLUCOSE SERPL-MCNC: 116 MG/DL (ref 74–99)
HCT VFR BLD AUTO: 41.6 % (ref 37–47)
HGB BLD-MCNC: 13.4 G/DL (ref 12.5–16)
LYMPHOCYTES NFR BLD: 1.24 K/UL
LYMPHOCYTES RELATIVE PERCENT: 28 % (ref 24–44)
MCH RBC QN AUTO: 28.5 PG (ref 27–31)
MCHC RBC AUTO-ENTMCNC: 32.2 G/DL (ref 32–36)
MCV RBC AUTO: 88.3 FL (ref 78–100)
MONOCYTES NFR BLD: 0.28 K/UL
MONOCYTES NFR BLD: 6 % (ref 0–4)
NEUTROPHILS NFR BLD: 59 % (ref 36–66)
NEUTS SEG NFR BLD: 2.65 K/UL
PLATELET # BLD AUTO: 211 K/UL (ref 140–440)
PMV BLD AUTO: 9.6 FL (ref 7.5–11.1)
POTASSIUM SERPL-SCNC: 4.1 MMOL/L (ref 3.5–5.1)
PROT SERPL-MCNC: 7.1 G/DL (ref 6.4–8.2)
RBC # BLD AUTO: 4.71 M/UL (ref 4.2–5.4)
SODIUM SERPL-SCNC: 139 MMOL/L (ref 136–145)
TSH SERPL DL<=0.05 MIU/L-ACNC: 2.85 UIU/ML (ref 0.27–4.2)
WBC OTHER # BLD: 4.5 K/UL (ref 4–10.5)

## 2025-04-07 PROCEDURE — 84443 ASSAY THYROID STIM HORMONE: CPT

## 2025-04-07 PROCEDURE — 85025 COMPLETE CBC W/AUTO DIFF WBC: CPT

## 2025-04-07 PROCEDURE — 80053 COMPREHEN METABOLIC PANEL: CPT

## 2025-04-07 PROCEDURE — 36415 COLL VENOUS BLD VENIPUNCTURE: CPT

## 2025-04-08 ENCOUNTER — HOSPITAL ENCOUNTER (OUTPATIENT)
Dept: INFUSION THERAPY | Age: 60
Discharge: HOME | End: 2025-04-08
Payer: MEDICAID

## 2025-04-08 VITALS
SYSTOLIC BLOOD PRESSURE: 127 MMHG | OXYGEN SATURATION: 98 % | WEIGHT: 183.6 LBS | HEART RATE: 90 BPM | TEMPERATURE: 97.7 F | HEIGHT: 67 IN | BODY MASS INDEX: 28.82 KG/M2 | DIASTOLIC BLOOD PRESSURE: 86 MMHG

## 2025-04-08 DIAGNOSIS — Z11.59 SCREENING FOR VIRAL DISEASE: ICD-10-CM

## 2025-04-08 DIAGNOSIS — Z79.899 ENCOUNTER FOR LONG-TERM (CURRENT) USE OF MEDICATIONS: Primary | ICD-10-CM

## 2025-04-08 DIAGNOSIS — C15.9 ESOPHAGEAL ADENOCARCINOMA (HCC): ICD-10-CM

## 2025-04-08 PROCEDURE — 2500000003 HC RX 250 WO HCPCS: Performed by: INTERNAL MEDICINE

## 2025-04-08 PROCEDURE — 6360000002 HC RX W HCPCS: Performed by: INTERNAL MEDICINE

## 2025-04-08 PROCEDURE — 2580000003 HC RX 258: Performed by: INTERNAL MEDICINE

## 2025-04-08 PROCEDURE — 96413 CHEMO IV INFUSION 1 HR: CPT

## 2025-04-08 RX ORDER — SODIUM CHLORIDE 9 MG/ML
5-250 INJECTION, SOLUTION INTRAVENOUS PRN
Status: DISCONTINUED | OUTPATIENT
Start: 2025-04-08 | End: 2025-04-09 | Stop reason: HOSPADM

## 2025-04-08 RX ORDER — SODIUM CHLORIDE 0.9 % (FLUSH) 0.9 %
5-40 SYRINGE (ML) INJECTION PRN
Status: DISCONTINUED | OUTPATIENT
Start: 2025-04-08 | End: 2025-04-09 | Stop reason: HOSPADM

## 2025-04-08 RX ADMIN — SODIUM CHLORIDE 200 MG: 9 INJECTION, SOLUTION INTRAVENOUS at 11:58

## 2025-04-08 RX ADMIN — SODIUM CHLORIDE, PRESERVATIVE FREE 10 ML: 5 INJECTION INTRAVENOUS at 12:42

## 2025-04-08 ASSESSMENT — PAIN DESCRIPTION - LOCATION: LOCATION: FINGER (COMMENT WHICH ONE)

## 2025-04-28 ENCOUNTER — HOSPITAL ENCOUNTER (OUTPATIENT)
Dept: INFUSION THERAPY | Age: 60
Discharge: HOME | End: 2025-04-28
Payer: MEDICAID

## 2025-04-28 DIAGNOSIS — C15.9 ESOPHAGEAL ADENOCARCINOMA (HCC): ICD-10-CM

## 2025-04-28 DIAGNOSIS — Z11.59 SCREENING FOR VIRAL DISEASE: ICD-10-CM

## 2025-04-28 DIAGNOSIS — Z79.899 ENCOUNTER FOR LONG-TERM (CURRENT) USE OF MEDICATIONS: ICD-10-CM

## 2025-04-28 DIAGNOSIS — C15.9 ESOPHAGEAL ADENOCARCINOMA (HCC): Primary | ICD-10-CM

## 2025-04-28 LAB
ALBUMIN SERPL-MCNC: 3.9 G/DL (ref 3.4–5)
ALBUMIN/GLOB SERPL: 1.3 {RATIO} (ref 1.1–2.2)
ALP SERPL-CCNC: 85 U/L (ref 40–129)
ALT SERPL-CCNC: 11 U/L (ref 10–40)
ANION GAP SERPL CALCULATED.3IONS-SCNC: 13 MMOL/L (ref 9–17)
AST SERPL-CCNC: 21 U/L (ref 15–37)
BASOPHILS # BLD: 0.03 K/UL
BASOPHILS NFR BLD: 1 % (ref 0–1)
BILIRUB SERPL-MCNC: 0.3 MG/DL (ref 0–1)
BUN SERPL-MCNC: 16 MG/DL (ref 7–20)
CALCIUM SERPL-MCNC: 9.3 MG/DL (ref 8.3–10.6)
CHLORIDE SERPL-SCNC: 105 MMOL/L (ref 99–110)
CO2 SERPL-SCNC: 22 MMOL/L (ref 21–32)
CREAT SERPL-MCNC: 1.1 MG/DL (ref 0.6–1.1)
EOSINOPHIL # BLD: 0.35 K/UL
EOSINOPHILS RELATIVE PERCENT: 7 % (ref 0–3)
ERYTHROCYTE [DISTWIDTH] IN BLOOD BY AUTOMATED COUNT: 14.5 % (ref 11.7–14.9)
GFR, ESTIMATED: 52 ML/MIN/1.73M2
GLUCOSE SERPL-MCNC: 89 MG/DL (ref 74–99)
HCT VFR BLD AUTO: 41.3 % (ref 37–47)
HGB BLD-MCNC: 13.4 G/DL (ref 12.5–16)
LYMPHOCYTES NFR BLD: 1.54 K/UL
LYMPHOCYTES RELATIVE PERCENT: 29 % (ref 24–44)
MCH RBC QN AUTO: 28.7 PG (ref 27–31)
MCHC RBC AUTO-ENTMCNC: 32.4 G/DL (ref 32–36)
MCV RBC AUTO: 88.4 FL (ref 78–100)
MONOCYTES NFR BLD: 0.33 K/UL
MONOCYTES NFR BLD: 6 % (ref 0–5)
NEUTROPHILS NFR BLD: 58 % (ref 36–66)
NEUTS SEG NFR BLD: 3.12 K/UL
PLATELET # BLD AUTO: 209 K/UL (ref 140–440)
PMV BLD AUTO: 9.4 FL (ref 7.5–11.1)
POTASSIUM SERPL-SCNC: 4.3 MMOL/L (ref 3.5–5.1)
PROT SERPL-MCNC: 6.9 G/DL (ref 6.4–8.2)
RBC # BLD AUTO: 4.67 M/UL (ref 4.2–5.4)
SODIUM SERPL-SCNC: 140 MMOL/L (ref 136–145)
WBC OTHER # BLD: 5.4 K/UL (ref 4–10.5)

## 2025-04-28 PROCEDURE — 80053 COMPREHEN METABOLIC PANEL: CPT

## 2025-04-28 PROCEDURE — 36415 COLL VENOUS BLD VENIPUNCTURE: CPT

## 2025-04-28 PROCEDURE — 85025 COMPLETE CBC W/AUTO DIFF WBC: CPT

## 2025-04-28 RX ORDER — SODIUM CHLORIDE 9 MG/ML
5-250 INJECTION, SOLUTION INTRAVENOUS PRN
Status: CANCELLED | OUTPATIENT
Start: 2025-04-29

## 2025-04-28 RX ORDER — SODIUM CHLORIDE 0.9 % (FLUSH) 0.9 %
5-40 SYRINGE (ML) INJECTION PRN
OUTPATIENT
Start: 2025-05-20

## 2025-04-28 RX ORDER — SODIUM CHLORIDE 9 MG/ML
INJECTION, SOLUTION INTRAVENOUS CONTINUOUS
Status: CANCELLED | OUTPATIENT
Start: 2025-04-29

## 2025-04-28 RX ORDER — HEPARIN SODIUM (PORCINE) LOCK FLUSH IV SOLN 100 UNIT/ML 100 UNIT/ML
500 SOLUTION INTRAVENOUS PRN
OUTPATIENT
Start: 2025-05-20

## 2025-04-28 RX ORDER — ONDANSETRON 2 MG/ML
8 INJECTION INTRAMUSCULAR; INTRAVENOUS
OUTPATIENT
Start: 2025-05-20

## 2025-04-28 RX ORDER — EPINEPHRINE 1 MG/ML
0.3 INJECTION, SOLUTION, CONCENTRATE INTRAVENOUS PRN
Status: CANCELLED | OUTPATIENT
Start: 2025-04-29

## 2025-04-28 RX ORDER — ONDANSETRON 2 MG/ML
8 INJECTION INTRAMUSCULAR; INTRAVENOUS
Status: CANCELLED | OUTPATIENT
Start: 2025-04-29

## 2025-04-28 RX ORDER — SODIUM CHLORIDE 9 MG/ML
5-250 INJECTION, SOLUTION INTRAVENOUS PRN
OUTPATIENT
Start: 2025-05-20

## 2025-04-28 RX ORDER — DIPHENHYDRAMINE HYDROCHLORIDE 50 MG/ML
50 INJECTION, SOLUTION INTRAMUSCULAR; INTRAVENOUS
Status: CANCELLED | OUTPATIENT
Start: 2025-04-29

## 2025-04-28 RX ORDER — FAMOTIDINE 10 MG/ML
20 INJECTION, SOLUTION INTRAVENOUS
Status: CANCELLED | OUTPATIENT
Start: 2025-04-29

## 2025-04-28 RX ORDER — ALBUTEROL SULFATE 90 UG/1
4 INHALANT RESPIRATORY (INHALATION) PRN
OUTPATIENT
Start: 2025-05-20

## 2025-04-28 RX ORDER — MEPERIDINE HYDROCHLORIDE 50 MG/ML
12.5 INJECTION INTRAMUSCULAR; INTRAVENOUS; SUBCUTANEOUS PRN
Status: CANCELLED | OUTPATIENT
Start: 2025-04-29

## 2025-04-28 RX ORDER — MEPERIDINE HYDROCHLORIDE 50 MG/ML
12.5 INJECTION INTRAMUSCULAR; INTRAVENOUS; SUBCUTANEOUS PRN
OUTPATIENT
Start: 2025-05-20

## 2025-04-28 RX ORDER — HEPARIN SODIUM (PORCINE) LOCK FLUSH IV SOLN 100 UNIT/ML 100 UNIT/ML
500 SOLUTION INTRAVENOUS PRN
Status: CANCELLED | OUTPATIENT
Start: 2025-04-29

## 2025-04-28 RX ORDER — HYDROCORTISONE SODIUM SUCCINATE 100 MG/2ML
100 INJECTION INTRAMUSCULAR; INTRAVENOUS
OUTPATIENT
Start: 2025-05-20

## 2025-04-28 RX ORDER — DIPHENHYDRAMINE HYDROCHLORIDE 50 MG/ML
50 INJECTION, SOLUTION INTRAMUSCULAR; INTRAVENOUS
OUTPATIENT
Start: 2025-05-20

## 2025-04-28 RX ORDER — SODIUM CHLORIDE 0.9 % (FLUSH) 0.9 %
5-40 SYRINGE (ML) INJECTION PRN
Status: CANCELLED | OUTPATIENT
Start: 2025-04-29

## 2025-04-28 RX ORDER — ALBUTEROL SULFATE 90 UG/1
4 INHALANT RESPIRATORY (INHALATION) PRN
Status: CANCELLED | OUTPATIENT
Start: 2025-04-29

## 2025-04-28 RX ORDER — SODIUM CHLORIDE 9 MG/ML
INJECTION, SOLUTION INTRAVENOUS CONTINUOUS
OUTPATIENT
Start: 2025-05-20

## 2025-04-28 RX ORDER — ACETAMINOPHEN 325 MG/1
650 TABLET ORAL
Status: CANCELLED | OUTPATIENT
Start: 2025-04-29

## 2025-04-28 RX ORDER — EPINEPHRINE 1 MG/ML
0.3 INJECTION, SOLUTION, CONCENTRATE INTRAVENOUS PRN
OUTPATIENT
Start: 2025-05-20

## 2025-04-28 RX ORDER — ACETAMINOPHEN 325 MG/1
650 TABLET ORAL
OUTPATIENT
Start: 2025-05-20

## 2025-04-28 RX ORDER — FAMOTIDINE 10 MG/ML
20 INJECTION, SOLUTION INTRAVENOUS
OUTPATIENT
Start: 2025-05-20

## 2025-04-28 RX ORDER — HYDROCORTISONE SODIUM SUCCINATE 100 MG/2ML
100 INJECTION INTRAMUSCULAR; INTRAVENOUS
Status: CANCELLED | OUTPATIENT
Start: 2025-04-29

## 2025-04-29 ENCOUNTER — HOSPITAL ENCOUNTER (OUTPATIENT)
Dept: INFUSION THERAPY | Age: 60
Discharge: HOME | End: 2025-04-29
Payer: MEDICAID

## 2025-04-29 VITALS
HEIGHT: 67 IN | BODY MASS INDEX: 29.03 KG/M2 | WEIGHT: 185 LBS | HEART RATE: 68 BPM | OXYGEN SATURATION: 96 % | SYSTOLIC BLOOD PRESSURE: 134 MMHG | DIASTOLIC BLOOD PRESSURE: 82 MMHG

## 2025-04-29 DIAGNOSIS — Z11.59 SCREENING FOR VIRAL DISEASE: ICD-10-CM

## 2025-04-29 DIAGNOSIS — Z79.899 ENCOUNTER FOR LONG-TERM (CURRENT) USE OF MEDICATIONS: ICD-10-CM

## 2025-04-29 DIAGNOSIS — C15.9 ESOPHAGEAL ADENOCARCINOMA (HCC): Primary | ICD-10-CM

## 2025-04-29 PROCEDURE — 6360000002 HC RX W HCPCS: Performed by: INTERNAL MEDICINE

## 2025-04-29 PROCEDURE — 96413 CHEMO IV INFUSION 1 HR: CPT

## 2025-04-29 PROCEDURE — 2580000003 HC RX 258: Performed by: INTERNAL MEDICINE

## 2025-04-29 PROCEDURE — 2500000003 HC RX 250 WO HCPCS: Performed by: INTERNAL MEDICINE

## 2025-04-29 RX ORDER — SODIUM CHLORIDE 0.9 % (FLUSH) 0.9 %
5-40 SYRINGE (ML) INJECTION PRN
Status: DISCONTINUED | OUTPATIENT
Start: 2025-04-29 | End: 2025-04-30 | Stop reason: HOSPADM

## 2025-04-29 RX ORDER — OMEPRAZOLE 40 MG/1
40 CAPSULE, DELAYED RELEASE ORAL 2 TIMES DAILY
Qty: 60 CAPSULE | Refills: 3 | Status: SHIPPED | OUTPATIENT
Start: 2025-04-29 | End: 2025-05-29

## 2025-04-29 RX ORDER — OXYCODONE HYDROCHLORIDE 10 MG/1
10 TABLET ORAL EVERY 6 HOURS PRN
Qty: 60 TABLET | Refills: 0 | Status: SHIPPED | OUTPATIENT
Start: 2025-04-29 | End: 2025-05-14

## 2025-04-29 RX ADMIN — SODIUM CHLORIDE 200 MG: 9 INJECTION, SOLUTION INTRAVENOUS at 11:53

## 2025-04-29 RX ADMIN — SODIUM CHLORIDE, PRESERVATIVE FREE 20 ML: 5 INJECTION INTRAVENOUS at 12:36

## 2025-04-29 ASSESSMENT — PAIN SCALES - GENERAL: PAINLEVEL_OUTOF10: 2

## 2025-04-29 ASSESSMENT — PAIN DESCRIPTION - LOCATION: LOCATION: WRIST;HAND

## 2025-04-29 NOTE — TELEPHONE ENCOUNTER
Patient contacted our office in need of refill for omeprazole and oxycodone. Patient has a scheduled appointment on 5/1. Patients preferred pharmacy is [facility]. Pending for patients chart provider Anthony Guthrie.

## 2025-04-29 NOTE — PROGRESS NOTES
Patient arrived to treatment suite for immunotherapy infusion.  Right chest mediport accessed and good blood return noted.  Patient stated having watery eyes x approximately 3 months.  Discussed with Dr. Guthrie who will discuss with patient when he sees her next.  Treatment approved and given.  Patient tolerated well.  Left treatment suite ambulatory.  Discharge instructions provided.  RTC 5/20 for labs and treatment, OV 5/1.     Patient's status assessed and documented appropriately.  All labs and required results were also reviewed today.  Treatment parameters have been reviewed.  Today's treatment has been approved by the provider.  Treatment orders and medication sequencing (when applicable) was verified by 2 registered nurses.  The treatment plan was confirmed with the patient prior to administration, and the patient understands the need to report any treatment-related symptoms.     Prior to administration, when applicable, the following 8 elements of medication administration were reviewed with 2nd Registered Nurse prior to dosing: drug name, drug dose, infusion volume when prepared in a syringe, rate of administration, expiration dates and/or times, appearance and integrity of drug(s), and rate of pump for infusion.  The 5 rights of medication administration have been verified.

## 2025-05-01 ENCOUNTER — OFFICE VISIT (OUTPATIENT)
Dept: ONCOLOGY | Age: 60
End: 2025-05-01
Payer: MEDICAID

## 2025-05-01 ENCOUNTER — HOSPITAL ENCOUNTER (OUTPATIENT)
Dept: INFUSION THERAPY | Age: 60
Discharge: HOME | End: 2025-05-01
Payer: MEDICAID

## 2025-05-01 VITALS
HEART RATE: 91 BPM | SYSTOLIC BLOOD PRESSURE: 124 MMHG | OXYGEN SATURATION: 95 % | RESPIRATION RATE: 16 BRPM | WEIGHT: 185 LBS | DIASTOLIC BLOOD PRESSURE: 89 MMHG | BODY MASS INDEX: 29.03 KG/M2 | HEIGHT: 67 IN | TEMPERATURE: 97.2 F

## 2025-05-01 DIAGNOSIS — C15.9 ESOPHAGEAL ADENOCARCINOMA (HCC): Primary | ICD-10-CM

## 2025-05-01 PROCEDURE — 99212 OFFICE O/P EST SF 10 MIN: CPT

## 2025-05-01 PROCEDURE — 3017F COLORECTAL CA SCREEN DOC REV: CPT | Performed by: INTERNAL MEDICINE

## 2025-05-01 PROCEDURE — G8417 CALC BMI ABV UP PARAM F/U: HCPCS | Performed by: INTERNAL MEDICINE

## 2025-05-01 PROCEDURE — G8427 DOCREV CUR MEDS BY ELIG CLIN: HCPCS | Performed by: INTERNAL MEDICINE

## 2025-05-01 PROCEDURE — 99214 OFFICE O/P EST MOD 30 MIN: CPT | Performed by: INTERNAL MEDICINE

## 2025-05-01 PROCEDURE — 1036F TOBACCO NON-USER: CPT | Performed by: INTERNAL MEDICINE

## 2025-05-01 NOTE — PROGRESS NOTES
MA Rooming Questions  Patient: Denisa Basurto  MRN: [id number]    Date: 5/1/2025        1. Do you have any new issues?   yes - both hands cramping         2. Do you need any refills on medications?    no    3. Have you had any imaging done since your last visit?   no    4. Have you been hospitalized or seen in the emergency room since your last visit here?   no    5. Did the patient have a depression screening completed today? No    No data recorded     PHQ-9 Given to (if applicable):               PHQ-9 Score (if applicable):                     [] Positive     []  Negative              Does question #9 need addressed (if applicable)                     [] Yes    []  No               Jesenia Ahumada CMA      
gastroesophageal junction. Interval decrease in uptake associated with the cavitary left upper lobe nodule. Interval decrease in uptake associated with the solid left upper lobe pulmonary nodule. Interval resolution of the increased axillary lymph node uptake.    On May 1, 2025, she presented to me for follow up. 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    She is here for close monitoring of toxicity and side effects from immunotherapy. She is s/p 22th cycle of chemo and she is tolerating current chemo well. She doesn't encounter any major side effects from immunotherapy. I recommend to continue with immunotherapy and I will schedule for her 23rd cycle of immunotherapy on 5/20/25.     Reviewed labs done on 3/28/25. Her TSH was 2.85 on 4/7/25. We started levothyroxine since 1/14/25. We increased levothyroxine to 50 mcg daily. Will monitor TSH with reflex every 6 weekly.     Reviewed findings on PET/CT done on 1/27/25. She is achieving very good response to pembrolizumab. Recommend her to have repeat PET/CT in 6 months.     She has decided to continue with immunotherapy and prefers to delay surgery as well as SBRT for lung low grade NET.      D/W oncologist at [facility] Dr. Hernandez before. Will plan for laparoscopic evaluation after neoadjuvant immunotherapy before surgery. Since patient is not considering for surgery for now. Dr. Hernandez will see her as needed basis.     Keytruda induced arthralgia/arthritis - since her pain hasn't controlled well, I will increase oxycodone liquid to 10 mg Q4 hourly (she was on 5 mg Q4 prn before). I asked her to take it prn. I will ask her to continue with naproxen 250 mg BID prn.     Constipation - narcotics induced. Will start senna liquid form.     Immunotherapy induced itchiness - she is on zyrtec and hydroxyzine 25 mg Q8 prn. Will refer her to dermatology for further evaluation.     Smoking

## 2025-05-19 ENCOUNTER — HOSPITAL ENCOUNTER (OUTPATIENT)
Dept: INFUSION THERAPY | Age: 60
Discharge: HOME | End: 2025-05-19
Payer: MEDICAID

## 2025-05-19 DIAGNOSIS — Z11.59 SCREENING FOR VIRAL DISEASE: ICD-10-CM

## 2025-05-19 DIAGNOSIS — C15.9 ESOPHAGEAL ADENOCARCINOMA (HCC): ICD-10-CM

## 2025-05-19 DIAGNOSIS — Z79.899 ENCOUNTER FOR LONG-TERM (CURRENT) USE OF MEDICATIONS: ICD-10-CM

## 2025-05-19 LAB
ALBUMIN SERPL-MCNC: 3.9 G/DL (ref 3.4–5)
ALBUMIN/GLOB SERPL: 1.4 {RATIO} (ref 1.1–2.2)
ALP SERPL-CCNC: 76 U/L (ref 40–129)
ALT SERPL-CCNC: 10 U/L (ref 10–40)
ANION GAP SERPL CALCULATED.3IONS-SCNC: 11 MMOL/L (ref 9–17)
AST SERPL-CCNC: 17 U/L (ref 15–37)
BASOPHILS # BLD: 0.02 K/UL
BASOPHILS NFR BLD: 1 % (ref 0–1)
BILIRUB SERPL-MCNC: 0.3 MG/DL (ref 0–1)
BUN SERPL-MCNC: 12 MG/DL (ref 7–20)
CALCIUM SERPL-MCNC: 9.2 MG/DL (ref 8.3–10.6)
CHLORIDE SERPL-SCNC: 106 MMOL/L (ref 99–110)
CO2 SERPL-SCNC: 25 MMOL/L (ref 21–32)
CREAT SERPL-MCNC: 1.1 MG/DL (ref 0.6–1.1)
EOSINOPHIL # BLD: 0.43 K/UL
EOSINOPHILS RELATIVE PERCENT: 10 % (ref 0–3)
ERYTHROCYTE [DISTWIDTH] IN BLOOD BY AUTOMATED COUNT: 14.2 % (ref 11.7–14.9)
GFR, ESTIMATED: 50 ML/MIN/1.73M2
GLUCOSE SERPL-MCNC: 124 MG/DL (ref 74–99)
HCT VFR BLD AUTO: 42 % (ref 37–47)
HGB BLD-MCNC: 13.3 G/DL (ref 12.5–16)
LYMPHOCYTES NFR BLD: 1.17 K/UL
LYMPHOCYTES RELATIVE PERCENT: 28 % (ref 24–44)
MCH RBC QN AUTO: 28.9 PG (ref 27–31)
MCHC RBC AUTO-ENTMCNC: 31.7 G/DL (ref 32–36)
MCV RBC AUTO: 91.1 FL (ref 78–100)
MONOCYTES NFR BLD: 0.26 K/UL
MONOCYTES NFR BLD: 6 % (ref 0–5)
NEUTROPHILS NFR BLD: 55 % (ref 36–66)
NEUTS SEG NFR BLD: 2.26 K/UL
PLATELET # BLD AUTO: 197 K/UL (ref 140–440)
PMV BLD AUTO: 9.5 FL (ref 7.5–11.1)
POTASSIUM SERPL-SCNC: 4.2 MMOL/L (ref 3.5–5.1)
PROT SERPL-MCNC: 6.7 G/DL (ref 6.4–8.2)
RBC # BLD AUTO: 4.61 M/UL (ref 4.2–5.4)
SODIUM SERPL-SCNC: 142 MMOL/L (ref 136–145)
T4 FREE SERPL-MCNC: 1.1 NG/DL (ref 0.9–1.8)
TSH SERPL DL<=0.05 MIU/L-ACNC: 4.83 UIU/ML (ref 0.27–4.2)
WBC OTHER # BLD: 4.1 K/UL (ref 4–10.5)

## 2025-05-19 PROCEDURE — 36415 COLL VENOUS BLD VENIPUNCTURE: CPT

## 2025-05-19 PROCEDURE — 84443 ASSAY THYROID STIM HORMONE: CPT

## 2025-05-19 PROCEDURE — 84439 ASSAY OF FREE THYROXINE: CPT

## 2025-05-19 PROCEDURE — 85025 COMPLETE CBC W/AUTO DIFF WBC: CPT

## 2025-05-19 PROCEDURE — 80053 COMPREHEN METABOLIC PANEL: CPT

## 2025-05-20 ENCOUNTER — HOSPITAL ENCOUNTER (OUTPATIENT)
Dept: INFUSION THERAPY | Age: 60
Discharge: HOME | End: 2025-05-20
Payer: MEDICAID

## 2025-05-20 VITALS
HEIGHT: 67 IN | DIASTOLIC BLOOD PRESSURE: 80 MMHG | OXYGEN SATURATION: 98 % | SYSTOLIC BLOOD PRESSURE: 129 MMHG | HEART RATE: 98 BPM | WEIGHT: 187.6 LBS | TEMPERATURE: 97.5 F | BODY MASS INDEX: 29.44 KG/M2

## 2025-05-20 DIAGNOSIS — Z11.59 SCREENING FOR VIRAL DISEASE: ICD-10-CM

## 2025-05-20 DIAGNOSIS — C15.9 ESOPHAGEAL ADENOCARCINOMA (HCC): ICD-10-CM

## 2025-05-20 DIAGNOSIS — Z79.899 ENCOUNTER FOR LONG-TERM (CURRENT) USE OF MEDICATIONS: Primary | ICD-10-CM

## 2025-05-20 PROCEDURE — 96413 CHEMO IV INFUSION 1 HR: CPT

## 2025-05-20 PROCEDURE — 2580000003 HC RX 258: Performed by: INTERNAL MEDICINE

## 2025-05-20 PROCEDURE — 2500000003 HC RX 250 WO HCPCS: Performed by: INTERNAL MEDICINE

## 2025-05-20 PROCEDURE — 6360000002 HC RX W HCPCS: Performed by: INTERNAL MEDICINE

## 2025-05-20 RX ORDER — SODIUM CHLORIDE 0.9 % (FLUSH) 0.9 %
5-40 SYRINGE (ML) INJECTION PRN
Status: DISCONTINUED | OUTPATIENT
Start: 2025-05-20 | End: 2025-05-21 | Stop reason: HOSPADM

## 2025-05-20 RX ADMIN — SODIUM CHLORIDE 200 MG: 9 INJECTION, SOLUTION INTRAVENOUS at 12:12

## 2025-05-20 RX ADMIN — SODIUM CHLORIDE, PRESERVATIVE FREE 20 ML: 5 INJECTION INTRAVENOUS at 12:53

## 2025-05-20 ASSESSMENT — PAIN DESCRIPTION - ORIENTATION: ORIENTATION: LEFT;RIGHT

## 2025-05-20 ASSESSMENT — PAIN DESCRIPTION - LOCATION: LOCATION: GENERALIZED;HAND

## 2025-05-20 ASSESSMENT — PAIN SCALES - GENERAL: PAINLEVEL_OUTOF10: 4

## 2025-05-20 NOTE — PROGRESS NOTES
Ambulated to infusion area, here today for treatment. No concerns at this time. Right chest mediport accessed, positive blood return noted. Labs reviewed, TSH 4.83, T4  WDL. Patient on thyroid supplements reports she may have missed a dose but unsure.  Treatment administered as ordered. Call light within reach. Tolerated infusion without incident.  Discharge instructions declined.    Appointments as of end of treatment day:  06/09 labs  06/10 infusion and Ov with Dr. Guthrie.    Status appropriately assessed and documented. All required labs and results reviewed. Treatment approved by provider. Treatment orders and medications verified by 2 Registered Nurses where applicable. Treatment plan was confirmed with patient prior to administration, and educated the need to report any treatment-related symptoms

## 2025-06-03 DIAGNOSIS — C15.9 ESOPHAGEAL ADENOCARCINOMA (HCC): Primary | ICD-10-CM

## 2025-06-09 ENCOUNTER — HOSPITAL ENCOUNTER (OUTPATIENT)
Dept: INFUSION THERAPY | Age: 60
Discharge: HOME | End: 2025-06-09
Payer: MEDICAID

## 2025-06-09 DIAGNOSIS — Z79.899 ENCOUNTER FOR LONG-TERM (CURRENT) USE OF MEDICATIONS: ICD-10-CM

## 2025-06-09 DIAGNOSIS — Z11.59 SCREENING FOR VIRAL DISEASE: ICD-10-CM

## 2025-06-09 DIAGNOSIS — C15.9 ESOPHAGEAL ADENOCARCINOMA (HCC): ICD-10-CM

## 2025-06-09 LAB
ALBUMIN SERPL-MCNC: 3.7 G/DL (ref 3.4–5)
ALBUMIN/GLOB SERPL: 1.3 {RATIO} (ref 1.1–2.2)
ALP SERPL-CCNC: 89 U/L (ref 40–129)
ALT SERPL-CCNC: 6 U/L (ref 10–40)
ANION GAP SERPL CALCULATED.3IONS-SCNC: 11 MMOL/L (ref 9–17)
AST SERPL-CCNC: 15 U/L (ref 15–37)
BASOPHILS # BLD: 0.03 K/UL
BASOPHILS NFR BLD: 1 % (ref 0–1)
BILIRUB SERPL-MCNC: <0.2 MG/DL (ref 0–1)
BUN SERPL-MCNC: 11 MG/DL (ref 7–20)
CALCIUM SERPL-MCNC: 8.6 MG/DL (ref 8.3–10.6)
CHLORIDE SERPL-SCNC: 103 MMOL/L (ref 99–110)
CO2 SERPL-SCNC: 24 MMOL/L (ref 21–32)
CREAT SERPL-MCNC: 1.1 MG/DL (ref 0.6–1.1)
EOSINOPHIL # BLD: 0.42 K/UL
EOSINOPHILS RELATIVE PERCENT: 10 % (ref 0–3)
ERYTHROCYTE [DISTWIDTH] IN BLOOD BY AUTOMATED COUNT: 14.1 % (ref 11.7–14.9)
GFR, ESTIMATED: 54 ML/MIN/1.73M2
GLUCOSE SERPL-MCNC: 108 MG/DL (ref 74–99)
HCT VFR BLD AUTO: 41.3 % (ref 37–47)
HGB BLD-MCNC: 13 G/DL (ref 12.5–16)
LYMPHOCYTES NFR BLD: 1.29 K/UL
LYMPHOCYTES RELATIVE PERCENT: 31 % (ref 24–44)
MCH RBC QN AUTO: 29 PG (ref 27–31)
MCHC RBC AUTO-ENTMCNC: 31.5 G/DL (ref 32–36)
MCV RBC AUTO: 92.2 FL (ref 78–100)
MONOCYTES NFR BLD: 0.28 K/UL
MONOCYTES NFR BLD: 7 % (ref 0–5)
NEUTROPHILS NFR BLD: 52 % (ref 36–66)
NEUTS SEG NFR BLD: 2.16 K/UL
PLATELET # BLD AUTO: 227 K/UL (ref 140–440)
PMV BLD AUTO: 9.6 FL (ref 7.5–11.1)
POTASSIUM SERPL-SCNC: 3.5 MMOL/L (ref 3.5–5.1)
PROT SERPL-MCNC: 6.6 G/DL (ref 6.4–8.2)
RBC # BLD AUTO: 4.48 M/UL (ref 4.2–5.4)
SODIUM SERPL-SCNC: 138 MMOL/L (ref 136–145)
WBC OTHER # BLD: 4.2 K/UL (ref 4–10.5)

## 2025-06-09 PROCEDURE — 85025 COMPLETE CBC W/AUTO DIFF WBC: CPT

## 2025-06-09 PROCEDURE — 36415 COLL VENOUS BLD VENIPUNCTURE: CPT

## 2025-06-09 PROCEDURE — 80053 COMPREHEN METABOLIC PANEL: CPT

## 2025-06-09 RX ORDER — SODIUM CHLORIDE 9 MG/ML
5-250 INJECTION, SOLUTION INTRAVENOUS PRN
Status: CANCELLED | OUTPATIENT
Start: 2025-06-10

## 2025-06-09 RX ORDER — SODIUM CHLORIDE 0.9 % (FLUSH) 0.9 %
5-40 SYRINGE (ML) INJECTION PRN
Status: CANCELLED | OUTPATIENT
Start: 2025-06-10

## 2025-06-09 RX ORDER — FAMOTIDINE 10 MG/ML
20 INJECTION, SOLUTION INTRAVENOUS
Status: CANCELLED | OUTPATIENT
Start: 2025-06-10

## 2025-06-09 RX ORDER — ONDANSETRON 2 MG/ML
8 INJECTION INTRAMUSCULAR; INTRAVENOUS
Status: CANCELLED | OUTPATIENT
Start: 2025-06-10

## 2025-06-09 RX ORDER — ALBUTEROL SULFATE 90 UG/1
4 INHALANT RESPIRATORY (INHALATION) PRN
Status: CANCELLED | OUTPATIENT
Start: 2025-06-10

## 2025-06-09 RX ORDER — SODIUM CHLORIDE 9 MG/ML
INJECTION, SOLUTION INTRAVENOUS CONTINUOUS
Status: CANCELLED | OUTPATIENT
Start: 2025-06-10

## 2025-06-09 RX ORDER — DIPHENHYDRAMINE HYDROCHLORIDE 50 MG/ML
50 INJECTION, SOLUTION INTRAMUSCULAR; INTRAVENOUS
Status: CANCELLED | OUTPATIENT
Start: 2025-06-10

## 2025-06-09 RX ORDER — MEPERIDINE HYDROCHLORIDE 50 MG/ML
12.5 INJECTION INTRAMUSCULAR; INTRAVENOUS; SUBCUTANEOUS PRN
Status: CANCELLED | OUTPATIENT
Start: 2025-06-10

## 2025-06-09 RX ORDER — EPINEPHRINE 1 MG/ML
0.3 INJECTION, SOLUTION, CONCENTRATE INTRAVENOUS PRN
Status: CANCELLED | OUTPATIENT
Start: 2025-06-10

## 2025-06-09 RX ORDER — ACETAMINOPHEN 325 MG/1
650 TABLET ORAL
Status: CANCELLED | OUTPATIENT
Start: 2025-06-10

## 2025-06-09 RX ORDER — HEPARIN SODIUM (PORCINE) LOCK FLUSH IV SOLN 100 UNIT/ML 100 UNIT/ML
500 SOLUTION INTRAVENOUS PRN
Status: CANCELLED | OUTPATIENT
Start: 2025-06-10

## 2025-06-09 RX ORDER — HYDROCORTISONE SODIUM SUCCINATE 100 MG/2ML
100 INJECTION INTRAMUSCULAR; INTRAVENOUS
Status: CANCELLED | OUTPATIENT
Start: 2025-06-10

## 2025-06-10 ENCOUNTER — OFFICE VISIT (OUTPATIENT)
Dept: ONCOLOGY | Age: 60
End: 2025-06-10
Payer: MEDICAID

## 2025-06-10 ENCOUNTER — HOSPITAL ENCOUNTER (OUTPATIENT)
Dept: INFUSION THERAPY | Age: 60
Discharge: HOME | End: 2025-06-10
Payer: MEDICAID

## 2025-06-10 VITALS
WEIGHT: 185.2 LBS | DIASTOLIC BLOOD PRESSURE: 95 MMHG | TEMPERATURE: 97.8 F | OXYGEN SATURATION: 99 % | HEIGHT: 67 IN | BODY MASS INDEX: 29.07 KG/M2 | HEART RATE: 77 BPM | SYSTOLIC BLOOD PRESSURE: 142 MMHG

## 2025-06-10 DIAGNOSIS — Z11.59 SCREENING FOR VIRAL DISEASE: ICD-10-CM

## 2025-06-10 DIAGNOSIS — Z79.899 ENCOUNTER FOR LONG-TERM (CURRENT) USE OF MEDICATIONS: ICD-10-CM

## 2025-06-10 DIAGNOSIS — C15.9 ESOPHAGEAL ADENOCARCINOMA (HCC): Primary | ICD-10-CM

## 2025-06-10 PROCEDURE — 96413 CHEMO IV INFUSION 1 HR: CPT

## 2025-06-10 PROCEDURE — 2500000003 HC RX 250 WO HCPCS: Performed by: INTERNAL MEDICINE

## 2025-06-10 PROCEDURE — 6360000002 HC RX W HCPCS: Performed by: INTERNAL MEDICINE

## 2025-06-10 PROCEDURE — G8428 CUR MEDS NOT DOCUMENT: HCPCS | Performed by: INTERNAL MEDICINE

## 2025-06-10 PROCEDURE — 3017F COLORECTAL CA SCREEN DOC REV: CPT | Performed by: INTERNAL MEDICINE

## 2025-06-10 PROCEDURE — 1036F TOBACCO NON-USER: CPT | Performed by: INTERNAL MEDICINE

## 2025-06-10 PROCEDURE — G8417 CALC BMI ABV UP PARAM F/U: HCPCS | Performed by: INTERNAL MEDICINE

## 2025-06-10 PROCEDURE — 2580000003 HC RX 258: Performed by: INTERNAL MEDICINE

## 2025-06-10 PROCEDURE — 99214 OFFICE O/P EST MOD 30 MIN: CPT | Performed by: INTERNAL MEDICINE

## 2025-06-10 RX ORDER — OXYCODONE HYDROCHLORIDE 10 MG/1
10 TABLET ORAL EVERY 6 HOURS PRN
Qty: 60 TABLET | Refills: 0 | Status: SHIPPED | OUTPATIENT
Start: 2025-06-10 | End: 2025-06-25

## 2025-06-10 RX ORDER — LEVOTHYROXINE SODIUM 50 UG/1
50 TABLET ORAL DAILY
Qty: 30 TABLET | Refills: 3 | Status: SHIPPED | OUTPATIENT
Start: 2025-06-10

## 2025-06-10 RX ORDER — MELOXICAM 15 MG/1
15 TABLET ORAL DAILY
Qty: 30 TABLET | Refills: 3 | Status: SHIPPED | OUTPATIENT
Start: 2025-06-10

## 2025-06-10 RX ORDER — SODIUM CHLORIDE 9 MG/ML
5-250 INJECTION, SOLUTION INTRAVENOUS PRN
Status: DISCONTINUED | OUTPATIENT
Start: 2025-06-10 | End: 2025-06-11 | Stop reason: HOSPADM

## 2025-06-10 RX ORDER — SODIUM CHLORIDE 0.9 % (FLUSH) 0.9 %
5-40 SYRINGE (ML) INJECTION PRN
Status: DISCONTINUED | OUTPATIENT
Start: 2025-06-10 | End: 2025-06-11 | Stop reason: HOSPADM

## 2025-06-10 RX ADMIN — SODIUM CHLORIDE, PRESERVATIVE FREE 20 ML: 5 INJECTION INTRAVENOUS at 12:23

## 2025-06-10 RX ADMIN — SODIUM CHLORIDE 200 MG: 9 INJECTION, SOLUTION INTRAVENOUS at 11:41

## 2025-06-10 ASSESSMENT — PAIN DESCRIPTION - LOCATION: LOCATION: WRIST;SHOULDER

## 2025-06-10 ASSESSMENT — PAIN DESCRIPTION - ORIENTATION: ORIENTATION: LEFT;RIGHT

## 2025-06-10 ASSESSMENT — PAIN SCALES - GENERAL: PAINLEVEL_OUTOF10: 4

## 2025-06-10 NOTE — PROGRESS NOTES
Arrived at treatment suite for scheduled Keytruda infusion.     Mediport accessed per protocol.  Labs reviewed, within treatment parameters.     Denies concerns or questions at this time.     Treatment plan approved, released and completed as ordered.      Mediport flushed and de-accessed per protocol. Band aid applied at site. Discharge to OV.     AVS provided at checkout following OV with Dr. Guthrie.  Ruthie Mulligan RN

## 2025-06-10 NOTE — PROGRESS NOTES
Patient Name:  Denisa Basurto  Patient :  1965  Patient MRN:  [id number]     Primary Oncologist: Anthony Guthrie MD  Referring Provider: Osbaldo Walton APRN - NP     Date of Service: 6/10/2025      Chief Complaint:    Chief Complaint   Patient presents with    Follow-up     Patient Active Problem List:     Closed fracture of left distal radius and ulna     Esophageal adenocarcinoma (HCC)    HPI:   Denisa Basurto is a 59 y.o. female with no pertinent medical history referred to me on 24 for evaluation of esophageal adenocarcinoma.     She initially presented to ED with nausea vomiting and poor oral intake.  She is having stomach issues since 10, 2023. She has been unable to keep solids down and has about 40 pound weight loss.     CT abdomen and pelvis done on 2023 showed a heterogeneous solid mass in the gastric cardia extending to the gastroesophageal junction measuring about 6.6 x 6.8 x 7.7 cm.  CT chest done 2023 showed a 2.4 cm noncalcified pulmonary nodule within the left upper lobe as well as an additional 1.6 cm nodule in the left upper lobe.     Dr. Martinez attempted an EGD on 2023 and found obstructing distal esophageal mass, which is ulcerated and necrotic. He couldn't able to pass a pediatric scope beyond the mass.  Biopsy was done and pathology revealed moderately differentiated adenocarcinoma. Surgical oncology placed a feeding tube on 2024.     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     She was seen by CT surgeon, Dr. Figueroa at [facility] on 24. He scheduled for CT guided biopsy of second lung nodule and it is scheduled to be done on 24.     I requested MSI which came back unstable. PD-L1 for Keytruda - expressed (CPS >10) and Her2 negative (score 1+).     PET/CT scan on 24 showed Intensely hypermetabolic distal

## 2025-06-20 ENCOUNTER — TELEPHONE (OUTPATIENT)
Dept: ONCOLOGY | Age: 60
End: 2025-06-20

## 2025-06-20 NOTE — TELEPHONE ENCOUNTER
6/20/25 - spoke to pt and scheduled the 7/30/25 Pet scan at [facility] arrive at 7:45 am and NPO 6. Plain water only.

## 2025-06-30 ENCOUNTER — HOSPITAL ENCOUNTER (OUTPATIENT)
Dept: INFUSION THERAPY | Age: 60
Discharge: HOME | End: 2025-06-30
Payer: MEDICAID

## 2025-06-30 DIAGNOSIS — Z11.59 SCREENING FOR VIRAL DISEASE: ICD-10-CM

## 2025-06-30 DIAGNOSIS — Z79.899 ENCOUNTER FOR LONG-TERM (CURRENT) USE OF MEDICATIONS: ICD-10-CM

## 2025-06-30 DIAGNOSIS — C15.9 ESOPHAGEAL ADENOCARCINOMA (HCC): ICD-10-CM

## 2025-06-30 DIAGNOSIS — C15.9 ESOPHAGEAL ADENOCARCINOMA (HCC): Primary | ICD-10-CM

## 2025-06-30 LAB
ALBUMIN SERPL-MCNC: 3.7 G/DL (ref 3.4–5)
ALBUMIN/GLOB SERPL: 1.2 {RATIO} (ref 1.1–2.2)
ALP SERPL-CCNC: 90 U/L (ref 40–129)
ALT SERPL-CCNC: 7 U/L (ref 10–40)
ANION GAP SERPL CALCULATED.3IONS-SCNC: 11 MMOL/L (ref 9–17)
AST SERPL-CCNC: 12 U/L (ref 15–37)
BASOPHILS # BLD: 0.04 K/UL
BASOPHILS NFR BLD: 1 % (ref 0–1)
BILIRUB SERPL-MCNC: 0.3 MG/DL (ref 0–1)
BUN SERPL-MCNC: 10 MG/DL (ref 7–20)
CALCIUM SERPL-MCNC: 9.3 MG/DL (ref 8.3–10.6)
CHLORIDE SERPL-SCNC: 105 MMOL/L (ref 99–110)
CO2 SERPL-SCNC: 26 MMOL/L (ref 21–32)
CREAT SERPL-MCNC: 1.2 MG/DL (ref 0.6–1.1)
EOSINOPHIL # BLD: 0.35 K/UL
EOSINOPHILS RELATIVE PERCENT: 9 % (ref 0–3)
ERYTHROCYTE [DISTWIDTH] IN BLOOD BY AUTOMATED COUNT: 13.8 % (ref 11.7–14.9)
GFR, ESTIMATED: 47 ML/MIN/1.73M2
GLUCOSE SERPL-MCNC: 103 MG/DL (ref 74–99)
HCT VFR BLD AUTO: 41.4 % (ref 37–47)
HGB BLD-MCNC: 13.4 G/DL (ref 12.5–16)
LYMPHOCYTES NFR BLD: 1.38 K/UL
LYMPHOCYTES RELATIVE PERCENT: 34 % (ref 24–44)
MCH RBC QN AUTO: 28.8 PG (ref 27–31)
MCHC RBC AUTO-ENTMCNC: 32.4 G/DL (ref 32–36)
MCV RBC AUTO: 89 FL (ref 78–100)
MONOCYTES NFR BLD: 0.24 K/UL
MONOCYTES NFR BLD: 6 % (ref 0–5)
NEUTROPHILS NFR BLD: 51 % (ref 36–66)
NEUTS SEG NFR BLD: 2.1 K/UL
PLATELET # BLD AUTO: 220 K/UL (ref 140–440)
PMV BLD AUTO: 9.6 FL (ref 7.5–11.1)
POTASSIUM SERPL-SCNC: 4.2 MMOL/L (ref 3.5–5.1)
PROT SERPL-MCNC: 6.8 G/DL (ref 6.4–8.2)
RBC # BLD AUTO: 4.65 M/UL (ref 4.2–5.4)
SODIUM SERPL-SCNC: 141 MMOL/L (ref 136–145)
T4 FREE SERPL-MCNC: 1 NG/DL (ref 0.9–1.8)
TSH SERPL DL<=0.05 MIU/L-ACNC: 6.07 UIU/ML (ref 0.27–4.2)
WBC OTHER # BLD: 4.1 K/UL (ref 4–10.5)

## 2025-06-30 PROCEDURE — 84439 ASSAY OF FREE THYROXINE: CPT

## 2025-06-30 PROCEDURE — 85025 COMPLETE CBC W/AUTO DIFF WBC: CPT

## 2025-06-30 PROCEDURE — 84443 ASSAY THYROID STIM HORMONE: CPT

## 2025-06-30 PROCEDURE — 80053 COMPREHEN METABOLIC PANEL: CPT

## 2025-06-30 PROCEDURE — 36415 COLL VENOUS BLD VENIPUNCTURE: CPT

## 2025-06-30 RX ORDER — SODIUM CHLORIDE 9 MG/ML
5-250 INJECTION, SOLUTION INTRAVENOUS PRN
OUTPATIENT
Start: 2025-08-12

## 2025-06-30 RX ORDER — ACETAMINOPHEN 325 MG/1
650 TABLET ORAL
OUTPATIENT
Start: 2025-07-22

## 2025-06-30 RX ORDER — MEPERIDINE HYDROCHLORIDE 50 MG/ML
12.5 INJECTION INTRAMUSCULAR; INTRAVENOUS; SUBCUTANEOUS PRN
OUTPATIENT
Start: 2025-07-22

## 2025-06-30 RX ORDER — FAMOTIDINE 10 MG/ML
20 INJECTION, SOLUTION INTRAVENOUS
OUTPATIENT
Start: 2025-07-22

## 2025-06-30 RX ORDER — HEPARIN SODIUM (PORCINE) LOCK FLUSH IV SOLN 100 UNIT/ML 100 UNIT/ML
500 SOLUTION INTRAVENOUS PRN
Status: CANCELLED | OUTPATIENT
Start: 2025-07-01

## 2025-06-30 RX ORDER — ONDANSETRON 2 MG/ML
8 INJECTION INTRAMUSCULAR; INTRAVENOUS
OUTPATIENT
Start: 2025-07-22

## 2025-06-30 RX ORDER — ONDANSETRON 2 MG/ML
8 INJECTION INTRAMUSCULAR; INTRAVENOUS
OUTPATIENT
Start: 2025-08-12

## 2025-06-30 RX ORDER — SODIUM CHLORIDE 9 MG/ML
INJECTION, SOLUTION INTRAVENOUS CONTINUOUS
Status: CANCELLED | OUTPATIENT
Start: 2025-07-01

## 2025-06-30 RX ORDER — SODIUM CHLORIDE 9 MG/ML
INJECTION, SOLUTION INTRAVENOUS CONTINUOUS
OUTPATIENT
Start: 2025-07-22

## 2025-06-30 RX ORDER — SODIUM CHLORIDE 0.9 % (FLUSH) 0.9 %
5-40 SYRINGE (ML) INJECTION PRN
Status: CANCELLED | OUTPATIENT
Start: 2025-07-01

## 2025-06-30 RX ORDER — MEPERIDINE HYDROCHLORIDE 50 MG/ML
12.5 INJECTION INTRAMUSCULAR; INTRAVENOUS; SUBCUTANEOUS PRN
OUTPATIENT
Start: 2025-08-12

## 2025-06-30 RX ORDER — HEPARIN SODIUM (PORCINE) LOCK FLUSH IV SOLN 100 UNIT/ML 100 UNIT/ML
500 SOLUTION INTRAVENOUS PRN
OUTPATIENT
Start: 2025-07-22

## 2025-06-30 RX ORDER — SODIUM CHLORIDE 9 MG/ML
5-250 INJECTION, SOLUTION INTRAVENOUS PRN
Status: CANCELLED | OUTPATIENT
Start: 2025-07-01

## 2025-06-30 RX ORDER — SODIUM CHLORIDE 0.9 % (FLUSH) 0.9 %
5-40 SYRINGE (ML) INJECTION PRN
OUTPATIENT
Start: 2025-07-22

## 2025-06-30 RX ORDER — EPINEPHRINE 1 MG/ML
0.3 INJECTION, SOLUTION, CONCENTRATE INTRAVENOUS PRN
OUTPATIENT
Start: 2025-08-12

## 2025-06-30 RX ORDER — MEPERIDINE HYDROCHLORIDE 50 MG/ML
12.5 INJECTION INTRAMUSCULAR; INTRAVENOUS; SUBCUTANEOUS PRN
Status: CANCELLED | OUTPATIENT
Start: 2025-07-01

## 2025-06-30 RX ORDER — FAMOTIDINE 10 MG/ML
20 INJECTION, SOLUTION INTRAVENOUS
OUTPATIENT
Start: 2025-08-12

## 2025-06-30 RX ORDER — HEPARIN SODIUM (PORCINE) LOCK FLUSH IV SOLN 100 UNIT/ML 100 UNIT/ML
500 SOLUTION INTRAVENOUS PRN
OUTPATIENT
Start: 2025-08-12

## 2025-06-30 RX ORDER — FAMOTIDINE 10 MG/ML
20 INJECTION, SOLUTION INTRAVENOUS
Status: CANCELLED | OUTPATIENT
Start: 2025-07-01

## 2025-06-30 RX ORDER — SODIUM CHLORIDE 9 MG/ML
5-250 INJECTION, SOLUTION INTRAVENOUS PRN
OUTPATIENT
Start: 2025-07-22

## 2025-06-30 RX ORDER — DIPHENHYDRAMINE HYDROCHLORIDE 50 MG/ML
50 INJECTION, SOLUTION INTRAMUSCULAR; INTRAVENOUS
Status: CANCELLED | OUTPATIENT
Start: 2025-07-01

## 2025-06-30 RX ORDER — DIPHENHYDRAMINE HYDROCHLORIDE 50 MG/ML
50 INJECTION, SOLUTION INTRAMUSCULAR; INTRAVENOUS
OUTPATIENT
Start: 2025-08-12

## 2025-06-30 RX ORDER — DIPHENHYDRAMINE HYDROCHLORIDE 50 MG/ML
50 INJECTION, SOLUTION INTRAMUSCULAR; INTRAVENOUS
OUTPATIENT
Start: 2025-07-22

## 2025-06-30 RX ORDER — ONDANSETRON 2 MG/ML
8 INJECTION INTRAMUSCULAR; INTRAVENOUS
Status: CANCELLED | OUTPATIENT
Start: 2025-07-01

## 2025-06-30 RX ORDER — SODIUM CHLORIDE 9 MG/ML
INJECTION, SOLUTION INTRAVENOUS CONTINUOUS
OUTPATIENT
Start: 2025-08-12

## 2025-06-30 RX ORDER — EPINEPHRINE 1 MG/ML
0.3 INJECTION, SOLUTION, CONCENTRATE INTRAVENOUS PRN
OUTPATIENT
Start: 2025-07-22

## 2025-06-30 RX ORDER — HYDROCORTISONE SODIUM SUCCINATE 100 MG/2ML
100 INJECTION INTRAMUSCULAR; INTRAVENOUS
OUTPATIENT
Start: 2025-08-12

## 2025-06-30 RX ORDER — HYDROCORTISONE SODIUM SUCCINATE 100 MG/2ML
100 INJECTION INTRAMUSCULAR; INTRAVENOUS
Status: CANCELLED | OUTPATIENT
Start: 2025-07-01

## 2025-06-30 RX ORDER — ALBUTEROL SULFATE 90 UG/1
4 INHALANT RESPIRATORY (INHALATION) PRN
OUTPATIENT
Start: 2025-07-22

## 2025-06-30 RX ORDER — HYDROCORTISONE SODIUM SUCCINATE 100 MG/2ML
100 INJECTION INTRAMUSCULAR; INTRAVENOUS
OUTPATIENT
Start: 2025-07-22

## 2025-06-30 RX ORDER — ALBUTEROL SULFATE 90 UG/1
4 INHALANT RESPIRATORY (INHALATION) PRN
OUTPATIENT
Start: 2025-08-12

## 2025-06-30 RX ORDER — EPINEPHRINE 1 MG/ML
0.3 INJECTION, SOLUTION, CONCENTRATE INTRAVENOUS PRN
Status: CANCELLED | OUTPATIENT
Start: 2025-07-01

## 2025-06-30 RX ORDER — SODIUM CHLORIDE 0.9 % (FLUSH) 0.9 %
5-40 SYRINGE (ML) INJECTION PRN
OUTPATIENT
Start: 2025-08-12

## 2025-06-30 RX ORDER — ACETAMINOPHEN 325 MG/1
650 TABLET ORAL
Status: CANCELLED | OUTPATIENT
Start: 2025-07-01

## 2025-06-30 RX ORDER — ALBUTEROL SULFATE 90 UG/1
4 INHALANT RESPIRATORY (INHALATION) PRN
Status: CANCELLED | OUTPATIENT
Start: 2025-07-01

## 2025-06-30 RX ORDER — ACETAMINOPHEN 325 MG/1
650 TABLET ORAL
OUTPATIENT
Start: 2025-08-12

## 2025-07-01 ENCOUNTER — HOSPITAL ENCOUNTER (OUTPATIENT)
Dept: INFUSION THERAPY | Age: 60
Discharge: HOME | End: 2025-07-01
Payer: MEDICAID

## 2025-07-01 VITALS
OXYGEN SATURATION: 99 % | RESPIRATION RATE: 16 BRPM | WEIGHT: 183.6 LBS | HEART RATE: 81 BPM | TEMPERATURE: 97.6 F | HEIGHT: 67 IN | BODY MASS INDEX: 28.82 KG/M2 | SYSTOLIC BLOOD PRESSURE: 135 MMHG | DIASTOLIC BLOOD PRESSURE: 95 MMHG

## 2025-07-01 DIAGNOSIS — Z11.59 SCREENING FOR VIRAL DISEASE: ICD-10-CM

## 2025-07-01 DIAGNOSIS — C15.9 ESOPHAGEAL ADENOCARCINOMA (HCC): ICD-10-CM

## 2025-07-01 DIAGNOSIS — Z79.899 ENCOUNTER FOR LONG-TERM (CURRENT) USE OF MEDICATIONS: Primary | ICD-10-CM

## 2025-07-01 PROCEDURE — 6360000002 HC RX W HCPCS: Performed by: INTERNAL MEDICINE

## 2025-07-01 PROCEDURE — 2500000003 HC RX 250 WO HCPCS: Performed by: INTERNAL MEDICINE

## 2025-07-01 PROCEDURE — 2580000003 HC RX 258: Performed by: INTERNAL MEDICINE

## 2025-07-01 PROCEDURE — 96413 CHEMO IV INFUSION 1 HR: CPT

## 2025-07-01 RX ORDER — SODIUM CHLORIDE 9 MG/ML
5-250 INJECTION, SOLUTION INTRAVENOUS PRN
Status: DISCONTINUED | OUTPATIENT
Start: 2025-07-01 | End: 2025-07-02 | Stop reason: HOSPADM

## 2025-07-01 RX ORDER — LEVOTHYROXINE SODIUM 75 UG/1
75 TABLET ORAL DAILY
Qty: 30 TABLET | Refills: 0 | Status: SHIPPED | OUTPATIENT
Start: 2025-07-01

## 2025-07-01 RX ORDER — SODIUM CHLORIDE 0.9 % (FLUSH) 0.9 %
5-40 SYRINGE (ML) INJECTION PRN
Status: DISCONTINUED | OUTPATIENT
Start: 2025-07-01 | End: 2025-07-02 | Stop reason: HOSPADM

## 2025-07-01 RX ADMIN — SODIUM CHLORIDE 200 MG: 9 INJECTION, SOLUTION INTRAVENOUS at 11:44

## 2025-07-01 RX ADMIN — SODIUM CHLORIDE, PRESERVATIVE FREE 20 ML: 5 INJECTION INTRAVENOUS at 12:21

## 2025-07-01 NOTE — PROGRESS NOTES
Arrived at treatment suite for scheduled Keytruda infusion.      Mediport accessed per protocol.  Labs reviewed, within treatment parameters, with the exception of TSH 6.07. Dr. Guthrie updated, plan to increase Synthroid to 75mcg daily.  Prescription sent in to [facility] on [address].     Denies concerns or questions at this time.      Treatment plan approved, released and completed as ordered.       Mediport flushed and de-accessed per protocol. Band aid applied at site. Discharge to OV.      AVS provided at checkout following OV with Dr. Guthrie.  Ruthie Mulligan RN

## 2025-07-18 ENCOUNTER — HOSPITAL ENCOUNTER (OUTPATIENT)
Dept: PET IMAGING | Age: 60
Discharge: HOME | End: 2025-07-18
Attending: INTERNAL MEDICINE
Payer: MEDICAID

## 2025-07-18 DIAGNOSIS — C15.9 ESOPHAGEAL ADENOCARCINOMA (HCC): ICD-10-CM

## 2025-07-18 PROCEDURE — 78815 PET IMAGE W/CT SKULL-THIGH: CPT

## 2025-07-18 PROCEDURE — A9609 HC RX DIAGNOSTIC RADIOPHARMACEUTICAL: HCPCS | Performed by: INTERNAL MEDICINE

## 2025-07-18 PROCEDURE — 2500000003 HC RX 250 WO HCPCS: Performed by: INTERNAL MEDICINE

## 2025-07-18 PROCEDURE — 3430000000 HC RX DIAGNOSTIC RADIOPHARMACEUTICAL: Performed by: INTERNAL MEDICINE

## 2025-07-18 RX ORDER — FLUDEOXYGLUCOSE F 18 200 MCI/ML
15 INJECTION, SOLUTION INTRAVENOUS
Status: COMPLETED | OUTPATIENT
Start: 2025-07-18 | End: 2025-07-18

## 2025-07-18 RX ORDER — SODIUM CHLORIDE 0.9 % (FLUSH) 0.9 %
10 SYRINGE (ML) INJECTION PRN
Status: COMPLETED | OUTPATIENT
Start: 2025-07-18 | End: 2025-07-18

## 2025-07-18 RX ADMIN — FLUDEOXYGLUCOSE F 18 15 MILLICURIE: 200 INJECTION, SOLUTION INTRAVENOUS at 08:32

## 2025-07-18 RX ADMIN — SODIUM CHLORIDE, PRESERVATIVE FREE 10 ML: 5 INJECTION INTRAVENOUS at 08:32

## 2025-07-21 ENCOUNTER — HOSPITAL ENCOUNTER (OUTPATIENT)
Dept: INFUSION THERAPY | Age: 60
Discharge: HOME | End: 2025-07-21
Payer: MEDICAID

## 2025-07-21 DIAGNOSIS — Z79.899 ENCOUNTER FOR LONG-TERM (CURRENT) USE OF MEDICATIONS: ICD-10-CM

## 2025-07-21 DIAGNOSIS — C15.9 ESOPHAGEAL ADENOCARCINOMA (HCC): ICD-10-CM

## 2025-07-21 DIAGNOSIS — Z11.59 SCREENING FOR VIRAL DISEASE: ICD-10-CM

## 2025-07-21 LAB
ALBUMIN SERPL-MCNC: 3.8 G/DL (ref 3.4–5)
ALBUMIN/GLOB SERPL: 1.3 {RATIO} (ref 1.1–2.2)
ALP SERPL-CCNC: 85 U/L (ref 40–129)
ALT SERPL-CCNC: 6 U/L (ref 10–40)
ANION GAP SERPL CALCULATED.3IONS-SCNC: 11 MMOL/L (ref 9–17)
AST SERPL-CCNC: 14 U/L (ref 15–37)
BASOPHILS # BLD: 0.04 K/UL
BASOPHILS NFR BLD: 1 % (ref 0–1)
BILIRUB SERPL-MCNC: 0.3 MG/DL (ref 0–1)
BUN SERPL-MCNC: 10 MG/DL (ref 7–20)
CALCIUM SERPL-MCNC: 9.1 MG/DL (ref 8.3–10.6)
CHLORIDE SERPL-SCNC: 107 MMOL/L (ref 99–110)
CO2 SERPL-SCNC: 24 MMOL/L (ref 21–32)
CREAT SERPL-MCNC: 1.2 MG/DL (ref 0.6–1.1)
EOSINOPHIL # BLD: 0.47 K/UL
EOSINOPHILS RELATIVE PERCENT: 11 % (ref 0–3)
ERYTHROCYTE [DISTWIDTH] IN BLOOD BY AUTOMATED COUNT: 14.7 % (ref 11.7–14.9)
GFR, ESTIMATED: 48 ML/MIN/1.73M2
GLUCOSE SERPL-MCNC: 106 MG/DL (ref 74–99)
HCT VFR BLD AUTO: 41.7 % (ref 37–47)
HGB BLD-MCNC: 13.2 G/DL (ref 12.5–16)
LYMPHOCYTES NFR BLD: 1.11 K/UL
LYMPHOCYTES RELATIVE PERCENT: 26 % (ref 24–44)
MCH RBC QN AUTO: 28.5 PG (ref 27–31)
MCHC RBC AUTO-ENTMCNC: 31.7 G/DL (ref 32–36)
MCV RBC AUTO: 90.1 FL (ref 78–100)
MONOCYTES NFR BLD: 0.23 K/UL
MONOCYTES NFR BLD: 6 % (ref 0–5)
NEUTROPHILS NFR BLD: 56 % (ref 36–66)
NEUTS SEG NFR BLD: 2.35 K/UL
PLATELET # BLD AUTO: 190 K/UL (ref 140–440)
PMV BLD AUTO: 9.5 FL (ref 7.5–11.1)
POTASSIUM SERPL-SCNC: 4.1 MMOL/L (ref 3.5–5.1)
PROT SERPL-MCNC: 6.8 G/DL (ref 6.4–8.2)
RBC # BLD AUTO: 4.63 M/UL (ref 4.2–5.4)
SODIUM SERPL-SCNC: 142 MMOL/L (ref 136–145)
WBC OTHER # BLD: 4.2 K/UL (ref 4–10.5)

## 2025-07-21 PROCEDURE — 36415 COLL VENOUS BLD VENIPUNCTURE: CPT

## 2025-07-21 PROCEDURE — 85025 COMPLETE CBC W/AUTO DIFF WBC: CPT

## 2025-07-21 PROCEDURE — 80053 COMPREHEN METABOLIC PANEL: CPT

## 2025-07-22 ENCOUNTER — OFFICE VISIT (OUTPATIENT)
Dept: ONCOLOGY | Age: 60
End: 2025-07-22
Payer: MEDICAID

## 2025-07-22 ENCOUNTER — HOSPITAL ENCOUNTER (OUTPATIENT)
Dept: INFUSION THERAPY | Age: 60
Discharge: HOME | End: 2025-07-22
Payer: MEDICAID

## 2025-07-22 VITALS
WEIGHT: 187.2 LBS | BODY MASS INDEX: 29.38 KG/M2 | TEMPERATURE: 97.8 F | HEIGHT: 67 IN | DIASTOLIC BLOOD PRESSURE: 80 MMHG | SYSTOLIC BLOOD PRESSURE: 122 MMHG | OXYGEN SATURATION: 96 % | HEART RATE: 85 BPM

## 2025-07-22 DIAGNOSIS — Z11.59 SCREENING FOR VIRAL DISEASE: ICD-10-CM

## 2025-07-22 DIAGNOSIS — Z79.899 ENCOUNTER FOR LONG-TERM (CURRENT) USE OF MEDICATIONS: Primary | ICD-10-CM

## 2025-07-22 DIAGNOSIS — C15.9 ESOPHAGEAL ADENOCARCINOMA (HCC): ICD-10-CM

## 2025-07-22 DIAGNOSIS — C15.9 ESOPHAGEAL ADENOCARCINOMA (HCC): Primary | ICD-10-CM

## 2025-07-22 PROCEDURE — 2580000003 HC RX 258: Performed by: INTERNAL MEDICINE

## 2025-07-22 PROCEDURE — 99215 OFFICE O/P EST HI 40 MIN: CPT | Performed by: INTERNAL MEDICINE

## 2025-07-22 PROCEDURE — G8427 DOCREV CUR MEDS BY ELIG CLIN: HCPCS | Performed by: INTERNAL MEDICINE

## 2025-07-22 PROCEDURE — 1036F TOBACCO NON-USER: CPT | Performed by: INTERNAL MEDICINE

## 2025-07-22 PROCEDURE — 6360000002 HC RX W HCPCS: Performed by: INTERNAL MEDICINE

## 2025-07-22 PROCEDURE — 2500000003 HC RX 250 WO HCPCS: Performed by: INTERNAL MEDICINE

## 2025-07-22 PROCEDURE — 96413 CHEMO IV INFUSION 1 HR: CPT

## 2025-07-22 PROCEDURE — 3017F COLORECTAL CA SCREEN DOC REV: CPT | Performed by: INTERNAL MEDICINE

## 2025-07-22 PROCEDURE — G8417 CALC BMI ABV UP PARAM F/U: HCPCS | Performed by: INTERNAL MEDICINE

## 2025-07-22 RX ORDER — LEVOTHYROXINE SODIUM 75 UG/1
75 TABLET ORAL DAILY
Qty: 30 TABLET | Refills: 0 | Status: SHIPPED | OUTPATIENT
Start: 2025-07-22

## 2025-07-22 RX ORDER — OXYCODONE HYDROCHLORIDE 10 MG/1
10 TABLET ORAL EVERY 6 HOURS PRN
Qty: 60 TABLET | Refills: 0 | Status: SHIPPED | OUTPATIENT
Start: 2025-07-22 | End: 2025-08-06

## 2025-07-22 RX ORDER — SODIUM CHLORIDE 0.9 % (FLUSH) 0.9 %
5-40 SYRINGE (ML) INJECTION PRN
Status: DISCONTINUED | OUTPATIENT
Start: 2025-07-22 | End: 2025-07-23 | Stop reason: HOSPADM

## 2025-07-22 RX ADMIN — SODIUM CHLORIDE, PRESERVATIVE FREE 20 ML: 5 INJECTION INTRAVENOUS at 12:13

## 2025-07-22 RX ADMIN — SODIUM CHLORIDE 200 MG: 9 INJECTION, SOLUTION INTRAVENOUS at 11:34

## 2025-07-22 NOTE — PROGRESS NOTES
Ambulated to infusion area after OV with Dr. Guthrie, here today for treatment. No concerns at this time. Right chest mediport accessed, positive blood return noted. Labs reviewed, Labs within defined limits.  Treatment administered as ordered. Call light within reach. Tolerated infusion without incident.      Appointments as of end of treatment day:  08/11 labs  08/12 treatment  09/02 Ov with Dr. Guthrie.    Status appropriately assessed and documented. All required labs and results reviewed. Treatment approved by provider. Treatment orders and medications verified by 2 Registered Nurses where applicable. Treatment plan was confirmed with patient prior to administration, and educated the need to report any treatment-related symptoms

## 2025-07-22 NOTE — PROGRESS NOTES
MA/LPN Rooming Questions  Patient: Denisa Basurto  MRN: [id number]    Date: 7/22/2025        1. Do you have any new issues?        no    2. Do you need any refills on medications?    yes - synthriod and oxycodone.     3. Have you had any imaging done since your last visit?   no    4. Have you been hospitalized or seen in the emergency room since your last visit here?   no    5. Did the patient have a depression screening completed today? No    No data recorded     PHQ-9 Given to (if applicable):               PHQ-9 Score (if applicable):                     [] Positive     []  Negative              Does question #9 need addressed (if applicable)                     [] Yes    []  No               Carmen Mccartney MA      
evaluation after neoadjuvant immunotherapy before surgery. Since patient is not considering for surgery for now. Dr. Hernandez will see her as needed basis.     Keytruda induced arthralgia/arthritis - since her pain hasn't controlled well, I will increase oxycodone liquid to 10 mg Q4 hourly (she was on 5 mg Q4 prn before). I asked her to take it prn. I will ask her to continue with naproxen 250 mg BID prn.     Constipation - narcotics induced. Will start senna liquid form.     Immunotherapy induced itchiness - she is on zyrtec and hydroxyzine 25 mg Q8 prn. Will refer her to dermatology for further evaluation.     Smoking cessation - she is on nicotine patch. I gave another prescription for patch today.     She stated that her dysphagia is getting some better since starting immunotherapy.     Lung low grade neuroendocrine tumor - following with CT surgery at [facility]. Rad Onc in [facility] recommends SBRT, but she prefers to wait. Second lung nodule biopsy showed benign findings only. Now she agrees to have SBRT and she will contact rad onc at [facility] soon.     Malignancy related pain - she is on oxycodone 10 mg every 4 - 6 hourly prn. Her pain is controlled well with it and she doesn't require it often. I asked her to take it only when she really needs it. I checked her OARRS report regularly. Since her swallowing is better, I change it to pills form starting from 2/4/25. I provide oxycodone 10 mg (60 tablets) today.      I answered all her questions and concerns for today. Recent imaging and labs were reviewed and discussed with the patient.     Time to complete visit: 43 minutes  This time includes: preparing to see the patient (review of tests/history), obtaining and/or reviewing separately obtained history, performing a medically appropriate evaluation, counseling and educating the patient and documenting clinical information in the electronic health record. This time also includes multiple disciplinary evaluation and management of

## 2025-07-29 RX ORDER — PREDNISOLONE ACETATE 1.2 MG/ML
SUSPENSION/ DROPS OPHTHALMIC
Qty: 10 ML | Refills: 0 | Status: SHIPPED | OUTPATIENT
Start: 2025-07-29

## 2025-07-29 RX ORDER — LEVOTHYROXINE SODIUM 75 UG/1
75 TABLET ORAL DAILY
Qty: 30 TABLET | Refills: 0 | OUTPATIENT
Start: 2025-07-29

## 2025-08-11 ENCOUNTER — HOSPITAL ENCOUNTER (OUTPATIENT)
Dept: INFUSION THERAPY | Age: 60
Discharge: HOME | End: 2025-08-11
Payer: MEDICAID

## 2025-08-11 DIAGNOSIS — Z79.899 ENCOUNTER FOR LONG-TERM (CURRENT) USE OF MEDICATIONS: ICD-10-CM

## 2025-08-11 DIAGNOSIS — Z11.59 SCREENING FOR VIRAL DISEASE: ICD-10-CM

## 2025-08-11 DIAGNOSIS — C15.9 ESOPHAGEAL ADENOCARCINOMA (HCC): ICD-10-CM

## 2025-08-11 LAB
ALBUMIN SERPL-MCNC: 3.8 G/DL (ref 3.4–5)
ALBUMIN/GLOB SERPL: 1.3 {RATIO} (ref 1.1–2.2)
ALP SERPL-CCNC: 83 U/L (ref 40–129)
ALT SERPL-CCNC: 6 U/L (ref 10–40)
ANION GAP SERPL CALCULATED.3IONS-SCNC: 10 MMOL/L (ref 9–17)
AST SERPL-CCNC: 15 U/L (ref 15–37)
BASOPHILS # BLD: 0.05 K/UL
BASOPHILS NFR BLD: 1 % (ref 0–1)
BILIRUB SERPL-MCNC: 0.2 MG/DL (ref 0–1)
BUN SERPL-MCNC: 13 MG/DL (ref 7–20)
CALCIUM SERPL-MCNC: 8.7 MG/DL (ref 8.3–10.6)
CHLORIDE SERPL-SCNC: 107 MMOL/L (ref 99–110)
CO2 SERPL-SCNC: 26 MMOL/L (ref 21–32)
CREAT SERPL-MCNC: 1.1 MG/DL (ref 0.6–1.2)
EOSINOPHIL # BLD: 0.36 K/UL
EOSINOPHILS RELATIVE PERCENT: 9 % (ref 0–3)
ERYTHROCYTE [DISTWIDTH] IN BLOOD BY AUTOMATED COUNT: 15.1 % (ref 11.7–14.9)
GFR, ESTIMATED: 52 ML/MIN/1.73M2
GLUCOSE SERPL-MCNC: 105 MG/DL (ref 74–99)
HCT VFR BLD AUTO: 39.5 % (ref 37–47)
HGB BLD-MCNC: 12.6 G/DL (ref 12.5–16)
LYMPHOCYTES NFR BLD: 1.2 K/UL
LYMPHOCYTES RELATIVE PERCENT: 29 % (ref 24–44)
MCH RBC QN AUTO: 28.8 PG (ref 27–31)
MCHC RBC AUTO-ENTMCNC: 31.9 G/DL (ref 32–36)
MCV RBC AUTO: 90.4 FL (ref 78–100)
MONOCYTES NFR BLD: 0.24 K/UL
MONOCYTES NFR BLD: 6 % (ref 0–5)
NEUTROPHILS NFR BLD: 55 % (ref 36–66)
NEUTS SEG NFR BLD: 2.23 K/UL
PLATELET # BLD AUTO: 200 K/UL (ref 140–440)
PMV BLD AUTO: 9.5 FL (ref 7.5–11.1)
POTASSIUM SERPL-SCNC: 3.3 MMOL/L (ref 3.5–5.1)
PROT SERPL-MCNC: 6.7 G/DL (ref 6.4–8.2)
RBC # BLD AUTO: 4.37 M/UL (ref 4.2–5.4)
SODIUM SERPL-SCNC: 143 MMOL/L (ref 136–145)
TSH SERPL DL<=0.05 MIU/L-ACNC: 3.15 UIU/ML (ref 0.27–4.2)
WBC OTHER # BLD: 4.1 K/UL (ref 4–10.5)

## 2025-08-11 PROCEDURE — 80053 COMPREHEN METABOLIC PANEL: CPT

## 2025-08-11 PROCEDURE — 84443 ASSAY THYROID STIM HORMONE: CPT

## 2025-08-11 PROCEDURE — 85025 COMPLETE CBC W/AUTO DIFF WBC: CPT

## 2025-08-11 PROCEDURE — 36415 COLL VENOUS BLD VENIPUNCTURE: CPT

## 2025-08-12 ENCOUNTER — HOSPITAL ENCOUNTER (OUTPATIENT)
Dept: INFUSION THERAPY | Age: 60
Discharge: HOME | End: 2025-08-12
Payer: MEDICAID

## 2025-08-12 VITALS
HEIGHT: 67 IN | DIASTOLIC BLOOD PRESSURE: 87 MMHG | OXYGEN SATURATION: 98 % | TEMPERATURE: 97.7 F | WEIGHT: 189 LBS | BODY MASS INDEX: 29.66 KG/M2 | SYSTOLIC BLOOD PRESSURE: 126 MMHG | RESPIRATION RATE: 16 BRPM | HEART RATE: 60 BPM

## 2025-08-12 DIAGNOSIS — Z79.899 ENCOUNTER FOR LONG-TERM (CURRENT) USE OF MEDICATIONS: Primary | ICD-10-CM

## 2025-08-12 DIAGNOSIS — Z11.59 SCREENING FOR VIRAL DISEASE: ICD-10-CM

## 2025-08-12 DIAGNOSIS — C15.9 ESOPHAGEAL ADENOCARCINOMA (HCC): ICD-10-CM

## 2025-08-12 PROCEDURE — 96413 CHEMO IV INFUSION 1 HR: CPT

## 2025-08-12 PROCEDURE — 2500000003 HC RX 250 WO HCPCS: Performed by: INTERNAL MEDICINE

## 2025-08-12 PROCEDURE — 6360000002 HC RX W HCPCS: Performed by: INTERNAL MEDICINE

## 2025-08-12 PROCEDURE — 2580000003 HC RX 258: Performed by: INTERNAL MEDICINE

## 2025-08-12 RX ORDER — SODIUM CHLORIDE 0.9 % (FLUSH) 0.9 %
5-40 SYRINGE (ML) INJECTION PRN
Status: DISCONTINUED | OUTPATIENT
Start: 2025-08-12 | End: 2025-08-13 | Stop reason: HOSPADM

## 2025-08-12 RX ADMIN — SODIUM CHLORIDE, PRESERVATIVE FREE 20 ML: 5 INJECTION INTRAVENOUS at 12:33

## 2025-08-12 RX ADMIN — SODIUM CHLORIDE 200 MG: 9 INJECTION, SOLUTION INTRAVENOUS at 11:57

## 2025-08-25 RX ORDER — LEVOTHYROXINE SODIUM 75 UG/1
75 TABLET ORAL DAILY
Qty: 30 TABLET | Refills: 0 | Status: SHIPPED | OUTPATIENT
Start: 2025-08-25

## 2025-08-29 ENCOUNTER — HOSPITAL ENCOUNTER (OUTPATIENT)
Dept: INFUSION THERAPY | Age: 60
Discharge: HOME | End: 2025-08-29
Payer: MEDICAID

## 2025-08-29 DIAGNOSIS — Z11.59 SCREENING FOR VIRAL DISEASE: ICD-10-CM

## 2025-08-29 DIAGNOSIS — C15.9 ESOPHAGEAL ADENOCARCINOMA (HCC): ICD-10-CM

## 2025-08-29 DIAGNOSIS — Z79.899 ENCOUNTER FOR LONG-TERM (CURRENT) USE OF MEDICATIONS: ICD-10-CM

## 2025-08-29 LAB
ALBUMIN SERPL-MCNC: 4.1 G/DL (ref 3.4–5)
ALBUMIN/GLOB SERPL: 1.3 {RATIO} (ref 1.1–2.2)
ALP SERPL-CCNC: 86 U/L (ref 40–129)
ALT SERPL-CCNC: 8 U/L (ref 10–40)
ANION GAP SERPL CALCULATED.3IONS-SCNC: 11 MMOL/L (ref 9–17)
AST SERPL-CCNC: 15 U/L (ref 15–37)
BASOPHILS # BLD: 0.05 K/UL
BASOPHILS NFR BLD: 1 % (ref 0–1)
BILIRUB SERPL-MCNC: 0.4 MG/DL (ref 0–1)
BUN SERPL-MCNC: 18 MG/DL (ref 7–20)
CALCIUM SERPL-MCNC: 9.4 MG/DL (ref 8.3–10.6)
CHLORIDE SERPL-SCNC: 101 MMOL/L (ref 99–110)
CO2 SERPL-SCNC: 26 MMOL/L (ref 21–32)
CREAT SERPL-MCNC: 1.1 MG/DL (ref 0.6–1.2)
EOSINOPHIL # BLD: 0.36 K/UL
EOSINOPHILS RELATIVE PERCENT: 9 % (ref 0–3)
ERYTHROCYTE [DISTWIDTH] IN BLOOD BY AUTOMATED COUNT: 14.9 % (ref 11.7–14.9)
GFR, ESTIMATED: 49 ML/MIN/1.73M2
GLUCOSE SERPL-MCNC: 110 MG/DL (ref 74–99)
HCT VFR BLD AUTO: 41 % (ref 37–47)
HGB BLD-MCNC: 13.2 G/DL (ref 12.5–16)
LYMPHOCYTES NFR BLD: 0.7 K/UL
LYMPHOCYTES RELATIVE PERCENT: 17 % (ref 24–44)
MCH RBC QN AUTO: 29.3 PG (ref 27–31)
MCHC RBC AUTO-ENTMCNC: 32.2 G/DL (ref 32–36)
MCV RBC AUTO: 91.1 FL (ref 78–100)
MONOCYTES NFR BLD: 0.21 K/UL
MONOCYTES NFR BLD: 5 % (ref 0–5)
NEUTROPHILS NFR BLD: 68 % (ref 36–66)
NEUTS SEG NFR BLD: 2.76 K/UL
PLATELET # BLD AUTO: 190 K/UL (ref 140–440)
PMV BLD AUTO: 9.3 FL (ref 7.5–11.1)
POTASSIUM SERPL-SCNC: 4.3 MMOL/L (ref 3.5–5.1)
PROT SERPL-MCNC: 7.3 G/DL (ref 6.4–8.2)
RBC # BLD AUTO: 4.5 M/UL (ref 4.2–5.4)
SODIUM SERPL-SCNC: 138 MMOL/L (ref 136–145)
WBC OTHER # BLD: 4.1 K/UL (ref 4–10.5)

## 2025-08-29 PROCEDURE — 36415 COLL VENOUS BLD VENIPUNCTURE: CPT

## 2025-08-29 PROCEDURE — 85025 COMPLETE CBC W/AUTO DIFF WBC: CPT

## 2025-08-29 PROCEDURE — 80053 COMPREHEN METABOLIC PANEL: CPT

## 2025-09-02 ENCOUNTER — HOSPITAL ENCOUNTER (OUTPATIENT)
Dept: INFUSION THERAPY | Age: 60
Discharge: HOME | End: 2025-09-02
Payer: MEDICAID

## 2025-09-02 ENCOUNTER — OFFICE VISIT (OUTPATIENT)
Dept: ONCOLOGY | Age: 60
End: 2025-09-02
Payer: MEDICAID

## 2025-09-02 VITALS
HEART RATE: 101 BPM | SYSTOLIC BLOOD PRESSURE: 133 MMHG | WEIGHT: 187.6 LBS | DIASTOLIC BLOOD PRESSURE: 94 MMHG | TEMPERATURE: 98.1 F | BODY MASS INDEX: 29.44 KG/M2 | OXYGEN SATURATION: 94 % | HEIGHT: 67 IN

## 2025-09-02 DIAGNOSIS — Z11.59 SCREENING FOR VIRAL DISEASE: ICD-10-CM

## 2025-09-02 DIAGNOSIS — C15.9 ESOPHAGEAL ADENOCARCINOMA (HCC): Primary | ICD-10-CM

## 2025-09-02 DIAGNOSIS — Z79.899 ENCOUNTER FOR LONG-TERM (CURRENT) USE OF MEDICATIONS: ICD-10-CM

## 2025-09-02 PROCEDURE — 99214 OFFICE O/P EST MOD 30 MIN: CPT | Performed by: INTERNAL MEDICINE

## 2025-09-02 PROCEDURE — 96413 CHEMO IV INFUSION 1 HR: CPT

## 2025-09-02 PROCEDURE — 3017F COLORECTAL CA SCREEN DOC REV: CPT | Performed by: INTERNAL MEDICINE

## 2025-09-02 PROCEDURE — 1036F TOBACCO NON-USER: CPT | Performed by: INTERNAL MEDICINE

## 2025-09-02 PROCEDURE — 2580000003 HC RX 258: Performed by: PHYSICIAN ASSISTANT

## 2025-09-02 PROCEDURE — G8427 DOCREV CUR MEDS BY ELIG CLIN: HCPCS | Performed by: INTERNAL MEDICINE

## 2025-09-02 PROCEDURE — 6360000002 HC RX W HCPCS: Performed by: PHYSICIAN ASSISTANT

## 2025-09-02 PROCEDURE — G8417 CALC BMI ABV UP PARAM F/U: HCPCS | Performed by: INTERNAL MEDICINE

## 2025-09-02 PROCEDURE — 2500000003 HC RX 250 WO HCPCS: Performed by: PHYSICIAN ASSISTANT

## 2025-09-02 RX ORDER — ALBUTEROL SULFATE 90 UG/1
4 INHALANT RESPIRATORY (INHALATION) PRN
Status: CANCELLED | OUTPATIENT
Start: 2025-09-02

## 2025-09-02 RX ORDER — FAMOTIDINE 10 MG/ML
20 INJECTION, SOLUTION INTRAVENOUS
Status: CANCELLED | OUTPATIENT
Start: 2025-09-02

## 2025-09-02 RX ORDER — ONDANSETRON 2 MG/ML
8 INJECTION INTRAMUSCULAR; INTRAVENOUS
Status: CANCELLED | OUTPATIENT
Start: 2025-09-02

## 2025-09-02 RX ORDER — SODIUM CHLORIDE 9 MG/ML
5-250 INJECTION, SOLUTION INTRAVENOUS PRN
Status: CANCELLED | OUTPATIENT
Start: 2025-09-02

## 2025-09-02 RX ORDER — EPINEPHRINE 1 MG/ML
0.3 INJECTION, SOLUTION, CONCENTRATE INTRAVENOUS PRN
Status: CANCELLED | OUTPATIENT
Start: 2025-09-02

## 2025-09-02 RX ORDER — ACETAMINOPHEN 325 MG/1
650 TABLET ORAL
Status: CANCELLED | OUTPATIENT
Start: 2025-09-02

## 2025-09-02 RX ORDER — HEPARIN 100 UNIT/ML
500 SYRINGE INTRAVENOUS PRN
Status: CANCELLED | OUTPATIENT
Start: 2025-09-02

## 2025-09-02 RX ORDER — MEPERIDINE HYDROCHLORIDE 25 MG/ML
12.5 INJECTION INTRAMUSCULAR; INTRAVENOUS; SUBCUTANEOUS PRN
Status: CANCELLED | OUTPATIENT
Start: 2025-09-02

## 2025-09-02 RX ORDER — SODIUM CHLORIDE 0.9 % (FLUSH) 0.9 %
5-40 SYRINGE (ML) INJECTION PRN
Status: DISCONTINUED | OUTPATIENT
Start: 2025-09-02 | End: 2025-09-03 | Stop reason: HOSPADM

## 2025-09-02 RX ORDER — SODIUM CHLORIDE 9 MG/ML
INJECTION, SOLUTION INTRAVENOUS CONTINUOUS
Status: CANCELLED | OUTPATIENT
Start: 2025-09-02

## 2025-09-02 RX ORDER — DIPHENHYDRAMINE HYDROCHLORIDE 50 MG/ML
50 INJECTION, SOLUTION INTRAMUSCULAR; INTRAVENOUS
Status: CANCELLED | OUTPATIENT
Start: 2025-09-02

## 2025-09-02 RX ORDER — HYDROCORTISONE SODIUM SUCCINATE 100 MG/2ML
100 INJECTION INTRAMUSCULAR; INTRAVENOUS
Status: CANCELLED | OUTPATIENT
Start: 2025-09-02

## 2025-09-02 RX ORDER — OXYCODONE HYDROCHLORIDE 10 MG/1
10 TABLET ORAL EVERY 6 HOURS PRN
Qty: 60 TABLET | Refills: 0 | Status: SHIPPED | OUTPATIENT
Start: 2025-09-02 | End: 2025-09-17

## 2025-09-02 RX ADMIN — SODIUM CHLORIDE, PRESERVATIVE FREE 20 ML: 5 INJECTION INTRAVENOUS at 13:17

## 2025-09-02 RX ADMIN — SODIUM CHLORIDE 200 MG: 9 INJECTION, SOLUTION INTRAVENOUS at 12:39

## 2025-09-02 ASSESSMENT — PATIENT HEALTH QUESTIONNAIRE - PHQ9
SUM OF ALL RESPONSES TO PHQ QUESTIONS 1-9: 0
2. FEELING DOWN, DEPRESSED OR HOPELESS: NOT AT ALL
SUM OF ALL RESPONSES TO PHQ QUESTIONS 1-9: 0
SUM OF ALL RESPONSES TO PHQ QUESTIONS 1-9: 0
1. LITTLE INTEREST OR PLEASURE IN DOING THINGS: NOT AT ALL
SUM OF ALL RESPONSES TO PHQ QUESTIONS 1-9: 0

## (undated) DEVICE — GLOVE SURG SZ 65 CRM LTX FREE POLYISOPRENE POLYMER BEAD ANTI

## (undated) DEVICE — SPONGE GZ W4XL8IN COT WVN 12 PLY

## (undated) DEVICE — TUBING, SUCTION, 3/16" X 10', STRAIGHT: Brand: MEDLINE

## (undated) DEVICE — BANDAGE,ELASTIC,ESMARK,STERILE,4"X9',LF: Brand: MEDLINE

## (undated) DEVICE — TOWEL,OR,DSP,ST,BLUE,STD,6/PK,12PK/CS: Brand: MEDLINE

## (undated) DEVICE — ZIMMER® STERILE DISPOSABLE TOURNIQUET CUFF WITH PLC, DUAL PORT, SINGLE BLADDER, 18 IN. (46 CM)

## (undated) DEVICE — Z DISCONTINUED USE 2220190 SUTURE VICRYL SZ 3-0 L27IN ABSRB UD L26MM SH 1/2 CIR J416H

## (undated) DEVICE — PACK,BASIC,IX: Brand: MEDLINE

## (undated) DEVICE — ADHESIVE SKIN CLSR 0.7ML TOP DERMBND ADV

## (undated) DEVICE — SOLUTION SURG PREP POVIDONE IOD 10% 1/2 OZ BDINE

## (undated) DEVICE — BANDAGE COMPR W4INXL5YD WHT BGE POLY COT M E WRP WV HK AND

## (undated) DEVICE — DRESSING PETRO W3XL3IN OIL EMUL N ADH GZ KNIT IMPREG CELOS

## (undated) DEVICE — YANKAUER,FLEXIBLE HANDLE,REGLR CAPACITY: Brand: MEDLINE INDUSTRIES, INC.

## (undated) DEVICE — ELECTRODE ES AD CRDLSS PT RET REM POLYHESIVE

## (undated) DEVICE — SUTURE MONOCRYL SZ 4-0 L18IN ABSRB UD L19MM PS-2 3/8 CIR PRIM Y496G

## (undated) DEVICE — COVER,C-ARM,41X74: Brand: MEDLINE

## (undated) DEVICE — SUTURE ETHLN SZ 3-0 L30IN NONABSORBABLE BLK FS-1 L24MM 3/8 669H

## (undated) DEVICE — SYRINGE MED 10ML LUERLOCK TIP W/O SFTY DISP

## (undated) DEVICE — INTENDED FOR TISSUE SEPARATION, AND OTHER PROCEDURES THAT REQUIRE A SHARP SURGICAL BLADE TO PUNCTURE OR CUT.: Brand: BARD-PARKER ® STAINLESS STEEL BLADES

## (undated) DEVICE — COTTON UNDERCAST PADDING,REGULAR FINISH: Brand: WEBRIL

## (undated) DEVICE — PENCIL ES CRD L10FT HND SWCHING ROCK SWCH W/ EDGE COAT BLDE

## (undated) DEVICE — DECANTER FLD 9IN ST BG FOR ASEP TRNSF OF FLD

## (undated) DEVICE — MARKER SURG SKIN UTIL REGULAR/FINE 2 TIP W/ RUL AND 9 LBL

## (undated) DEVICE — SLING ARM L L165IN D75IN WHT POLY MESH ENVELOP MTL SIDE

## (undated) DEVICE — DRAPE SHEET ULTRAGARD: Brand: MEDLINE

## (undated) DEVICE — SPONGE LAP W18XL18IN WHT COT 4 PLY FLD STRUNG RADPQ DISP ST

## (undated) DEVICE — CORTICAL SCREW 2.5, SMALL HEAD T8, SELF-TAPP. L 24: Type: IMPLANTABLE DEVICE | Site: WRIST | Status: NON-FUNCTIONAL

## (undated) DEVICE — GOWN,SIRUS,POLYRNF,BRTHSLV,XLN/XL,20/CS: Brand: MEDLINE

## (undated) DEVICE — Z INACTIVE USE 2863041 SPONGE GZ W4XL4IN 100% COT 16 PLY RADPQ HIGHLY ABSRB

## (undated) DEVICE — SOLUTION IV IRRIG WATER 1000ML POUR BRL 2F7114

## (undated) DEVICE — DRAPE,LAPAROTOMY,TRANSVERSE,50/CS: Brand: MEDLINE

## (undated) DEVICE — NEEDLE HYPO 25GA L1.5IN BLU POLYPR HUB S STL REG BVL STR

## (undated) DEVICE — GOWN,ECLIPSE,POLYRNF,BRTHSLV,XL,30/CS: Brand: MEDLINE

## (undated) DEVICE — TWIST DRILL Ø2.0, L 110, COIL 30, QUICK COUPLING, SINGLE USE

## (undated) DEVICE — PADDING,UNDERCAST,COTTON, 4"X4YD STERILE: Brand: MEDLINE

## (undated) DEVICE — Z INACTIVE NO ACTIVE SUPPLIER APPLICATOR MEDICATED 26 CC TINT HI-LITE ORNG STRL CHLORAPREP

## (undated) DEVICE — DRAPE TOWEL: Brand: CONVERTORS

## (undated) DEVICE — COUNTER NDL 30 COUNT FOAM STRP SGL MAG

## (undated) DEVICE — CORTICAL SCREW 2.5, SMALL HEAD T8, SELF-TAPP. L 22: Type: IMPLANTABLE DEVICE | Site: WRIST | Status: NON-FUNCTIONAL

## (undated) DEVICE — E-Z CLEAN, NON-STICK, PTFE COATED, ELECTROSURGICAL BLADE ELECTRODE, MODIFIED EXTENDED INSULATION, 4 INCH (10.2 CM): Brand: MEGADYNE

## (undated) DEVICE — GLOVE ORANGE PI 8   MSG9080

## (undated) DEVICE — DRAPE,U/ SHT,SPLIT,PLAS,STERIL: Brand: MEDLINE